# Patient Record
Sex: MALE | Race: OTHER | HISPANIC OR LATINO | URBAN - METROPOLITAN AREA
[De-identification: names, ages, dates, MRNs, and addresses within clinical notes are randomized per-mention and may not be internally consistent; named-entity substitution may affect disease eponyms.]

---

## 2017-03-01 RX ORDER — AZTREONAM 2 G
1 VIAL (EA) INJECTION
Qty: 0 | Refills: 0 | COMMUNITY
Start: 2017-03-01 | End: 2017-03-09

## 2017-03-02 ENCOUNTER — INPATIENT (INPATIENT)
Facility: HOSPITAL | Age: 72
LOS: 3 days | Discharge: HOME | End: 2017-03-06
Attending: HOSPITALIST | Admitting: HOSPITALIST
Payer: COMMERCIAL

## 2017-03-02 VITALS
SYSTOLIC BLOOD PRESSURE: 99 MMHG | RESPIRATION RATE: 22 BRPM | DIASTOLIC BLOOD PRESSURE: 51 MMHG | TEMPERATURE: 98 F | OXYGEN SATURATION: 90 % | HEART RATE: 100 BPM

## 2017-03-02 DIAGNOSIS — Z96.653 PRESENCE OF ARTIFICIAL KNEE JOINT, BILATERAL: Chronic | ICD-10-CM

## 2017-03-02 LAB
ALBUMIN SERPL ELPH-MCNC: 3.7 G/DL — SIGNIFICANT CHANGE UP (ref 3.3–5)
ALP SERPL-CCNC: 38 U/L — LOW (ref 40–120)
ALT FLD-CCNC: 24 U/L — SIGNIFICANT CHANGE UP (ref 4–41)
APPEARANCE UR: CLEAR — SIGNIFICANT CHANGE UP
APTT BLD: 29.1 SEC — SIGNIFICANT CHANGE UP (ref 27.5–37.4)
AST SERPL-CCNC: 49 U/L — HIGH (ref 4–40)
B PERT DNA SPEC QL NAA+PROBE: SIGNIFICANT CHANGE UP
BASOPHILS # BLD AUTO: 0.01 K/UL — SIGNIFICANT CHANGE UP (ref 0–0.2)
BASOPHILS NFR BLD AUTO: 0.1 % — SIGNIFICANT CHANGE UP (ref 0–2)
BASOPHILS NFR SPEC: 0 % — SIGNIFICANT CHANGE UP (ref 0–2)
BILIRUB SERPL-MCNC: 1.1 MG/DL — SIGNIFICANT CHANGE UP (ref 0.2–1.2)
BILIRUB UR-MCNC: NEGATIVE — SIGNIFICANT CHANGE UP
BLOOD UR QL VISUAL: NEGATIVE — SIGNIFICANT CHANGE UP
BUN SERPL-MCNC: 21 MG/DL — SIGNIFICANT CHANGE UP (ref 7–23)
C PNEUM DNA SPEC QL NAA+PROBE: NOT DETECTED — SIGNIFICANT CHANGE UP
CALCIUM SERPL-MCNC: 8.6 MG/DL — SIGNIFICANT CHANGE UP (ref 8.4–10.5)
CHLORIDE SERPL-SCNC: 94 MMOL/L — LOW (ref 98–107)
CK MB BLD-MCNC: 1.1 — SIGNIFICANT CHANGE UP (ref 0–2.5)
CK MB BLD-MCNC: 10.42 NG/ML — HIGH (ref 1–6.6)
CK SERPL-CCNC: 975 U/L — HIGH (ref 30–200)
CO2 SERPL-SCNC: 21 MMOL/L — LOW (ref 22–31)
COLOR SPEC: HIGH
CREAT SERPL-MCNC: 1.22 MG/DL — SIGNIFICANT CHANGE UP (ref 0.5–1.3)
EOSINOPHIL # BLD AUTO: 0.02 K/UL — SIGNIFICANT CHANGE UP (ref 0–0.5)
EOSINOPHIL NFR BLD AUTO: 0.2 % — SIGNIFICANT CHANGE UP (ref 0–6)
EOSINOPHIL NFR FLD: 0 % — SIGNIFICANT CHANGE UP (ref 0–6)
FLUAV H1 2009 PAND RNA SPEC QL NAA+PROBE: POSITIVE — HIGH
FLUAV H1 RNA SPEC QL NAA+PROBE: NOT DETECTED — SIGNIFICANT CHANGE UP
FLUAV H3 RNA SPEC QL NAA+PROBE: NOT DETECTED — SIGNIFICANT CHANGE UP
FLUBV RNA SPEC QL NAA+PROBE: NOT DETECTED — SIGNIFICANT CHANGE UP
GIANT PLATELETS BLD QL SMEAR: PRESENT — SIGNIFICANT CHANGE UP
GLUCOSE SERPL-MCNC: 122 MG/DL — HIGH (ref 70–99)
GLUCOSE UR-MCNC: NEGATIVE — SIGNIFICANT CHANGE UP
HADV DNA SPEC QL NAA+PROBE: NOT DETECTED — SIGNIFICANT CHANGE UP
HCOV 229E RNA SPEC QL NAA+PROBE: NOT DETECTED — SIGNIFICANT CHANGE UP
HCOV HKU1 RNA SPEC QL NAA+PROBE: NOT DETECTED — SIGNIFICANT CHANGE UP
HCOV NL63 RNA SPEC QL NAA+PROBE: NOT DETECTED — SIGNIFICANT CHANGE UP
HCOV OC43 RNA SPEC QL NAA+PROBE: NOT DETECTED — SIGNIFICANT CHANGE UP
HCT VFR BLD CALC: 31.3 % — LOW (ref 39–50)
HGB BLD-MCNC: 10.4 G/DL — LOW (ref 13–17)
HMPV RNA SPEC QL NAA+PROBE: NOT DETECTED — SIGNIFICANT CHANGE UP
HPIV1 RNA SPEC QL NAA+PROBE: NOT DETECTED — SIGNIFICANT CHANGE UP
HPIV2 RNA SPEC QL NAA+PROBE: NOT DETECTED — SIGNIFICANT CHANGE UP
HPIV3 RNA SPEC QL NAA+PROBE: NOT DETECTED — SIGNIFICANT CHANGE UP
HPIV4 RNA SPEC QL NAA+PROBE: NOT DETECTED — SIGNIFICANT CHANGE UP
HYALINE CASTS # UR AUTO: SIGNIFICANT CHANGE UP (ref 0–?)
IMM GRANULOCYTES NFR BLD AUTO: 0.5 % — SIGNIFICANT CHANGE UP (ref 0–1.5)
INR BLD: 1.37 — HIGH (ref 0.87–1.18)
KETONES UR-MCNC: NEGATIVE — SIGNIFICANT CHANGE UP
LEUKOCYTE ESTERASE UR-ACNC: HIGH
LYMPHOCYTES # BLD AUTO: 0.52 K/UL — LOW (ref 1–3.3)
LYMPHOCYTES # BLD AUTO: 4 % — LOW (ref 13–44)
LYMPHOCYTES NFR SPEC AUTO: 4.2 % — LOW (ref 13–44)
M PNEUMO DNA SPEC QL NAA+PROBE: NOT DETECTED — SIGNIFICANT CHANGE UP
MCHC RBC-ENTMCNC: 31.3 PG — SIGNIFICANT CHANGE UP (ref 27–34)
MCHC RBC-ENTMCNC: 33.2 % — SIGNIFICANT CHANGE UP (ref 32–36)
MCV RBC AUTO: 94.3 FL — SIGNIFICANT CHANGE UP (ref 80–100)
MONOCYTES # BLD AUTO: 0.91 K/UL — HIGH (ref 0–0.9)
MONOCYTES NFR BLD AUTO: 7.1 % — SIGNIFICANT CHANGE UP (ref 2–14)
MONOCYTES NFR BLD: 7.6 % — SIGNIFICANT CHANGE UP (ref 2–9)
MUCOUS THREADS # UR AUTO: SIGNIFICANT CHANGE UP
NEUTROPHIL AB SER-ACNC: 78.9 % — HIGH (ref 43–77)
NEUTROPHILS # BLD AUTO: 11.36 K/UL — HIGH (ref 1.8–7.4)
NEUTROPHILS NFR BLD AUTO: 88.1 % — HIGH (ref 43–77)
NEUTS BAND # BLD: 9.3 % — HIGH (ref 0–6)
NITRITE UR-MCNC: NEGATIVE — SIGNIFICANT CHANGE UP
NON-SQ EPI CELLS # UR AUTO: <1 — SIGNIFICANT CHANGE UP
PH UR: 6 — SIGNIFICANT CHANGE UP (ref 4.6–8)
PLATELET # BLD AUTO: 171 K/UL — SIGNIFICANT CHANGE UP (ref 150–400)
PMV BLD: 10.7 FL — SIGNIFICANT CHANGE UP (ref 7–13)
POTASSIUM SERPL-MCNC: 4.2 MMOL/L — SIGNIFICANT CHANGE UP (ref 3.5–5.3)
POTASSIUM SERPL-SCNC: 4.2 MMOL/L — SIGNIFICANT CHANGE UP (ref 3.5–5.3)
PROT SERPL-MCNC: 7.1 G/DL — SIGNIFICANT CHANGE UP (ref 6–8.3)
PROT UR-MCNC: 30 — SIGNIFICANT CHANGE UP
PROTHROM AB SERPL-ACNC: 15.7 SEC — HIGH (ref 10–13.1)
RBC # BLD: 3.32 M/UL — LOW (ref 4.2–5.8)
RBC # FLD: 13.3 % — SIGNIFICANT CHANGE UP (ref 10.3–14.5)
RBC CASTS # UR COMP ASSIST: SIGNIFICANT CHANGE UP (ref 0–?)
RSV RNA SPEC QL NAA+PROBE: NOT DETECTED — SIGNIFICANT CHANGE UP
RV+EV RNA SPEC QL NAA+PROBE: NOT DETECTED — SIGNIFICANT CHANGE UP
SODIUM SERPL-SCNC: 133 MMOL/L — LOW (ref 135–145)
SP GR SPEC: 1.02 — SIGNIFICANT CHANGE UP (ref 1–1.03)
SQUAMOUS # UR AUTO: SIGNIFICANT CHANGE UP
TROPONIN T SERPL-MCNC: < 0.06 NG/ML — SIGNIFICANT CHANGE UP (ref 0–0.06)
UROBILINOGEN FLD QL: >8 E.U. — SIGNIFICANT CHANGE UP (ref 0.1–0.2)
WBC # BLD: 12.88 K/UL — HIGH (ref 3.8–10.5)
WBC # FLD AUTO: 12.88 K/UL — HIGH (ref 3.8–10.5)
WBC UR QL: HIGH (ref 0–?)

## 2017-03-02 PROCEDURE — 71020: CPT | Mod: 26

## 2017-03-02 PROCEDURE — 71275 CT ANGIOGRAPHY CHEST: CPT | Mod: 26

## 2017-03-02 RX ORDER — CIPROFLOXACIN LACTATE 400MG/40ML
400 VIAL (ML) INTRAVENOUS ONCE
Qty: 0 | Refills: 0 | Status: COMPLETED | OUTPATIENT
Start: 2017-03-02 | End: 2017-03-02

## 2017-03-02 RX ORDER — IPRATROPIUM/ALBUTEROL SULFATE 18-103MCG
3 AEROSOL WITH ADAPTER (GRAM) INHALATION ONCE
Qty: 0 | Refills: 0 | Status: COMPLETED | OUTPATIENT
Start: 2017-03-02 | End: 2017-03-02

## 2017-03-02 RX ORDER — VANCOMYCIN HCL 1 G
1000 VIAL (EA) INTRAVENOUS ONCE
Qty: 0 | Refills: 0 | Status: COMPLETED | OUTPATIENT
Start: 2017-03-02 | End: 2017-03-02

## 2017-03-02 RX ORDER — CEFEPIME 1 G/1
1000 INJECTION, POWDER, FOR SOLUTION INTRAMUSCULAR; INTRAVENOUS ONCE
Qty: 0 | Refills: 0 | Status: COMPLETED | OUTPATIENT
Start: 2017-03-02 | End: 2017-03-02

## 2017-03-02 RX ORDER — SODIUM CHLORIDE 9 MG/ML
1000 INJECTION INTRAMUSCULAR; INTRAVENOUS; SUBCUTANEOUS ONCE
Qty: 0 | Refills: 0 | Status: COMPLETED | OUTPATIENT
Start: 2017-03-02 | End: 2017-03-02

## 2017-03-02 RX ADMIN — Medication 3 MILLILITER(S): at 21:30

## 2017-03-02 RX ADMIN — Medication 250 MILLIGRAM(S): at 23:23

## 2017-03-02 RX ADMIN — CEFEPIME 100 MILLIGRAM(S): 1 INJECTION, POWDER, FOR SOLUTION INTRAMUSCULAR; INTRAVENOUS at 22:45

## 2017-03-02 RX ADMIN — SODIUM CHLORIDE 1000 MILLILITER(S): 9 INJECTION INTRAMUSCULAR; INTRAVENOUS; SUBCUTANEOUS at 18:23

## 2017-03-02 RX ADMIN — Medication 125 MILLIGRAM(S): at 21:30

## 2017-03-02 RX ADMIN — Medication 3 MILLILITER(S): at 21:20

## 2017-03-02 RX ADMIN — Medication 3 MILLILITER(S): at 19:00

## 2017-03-02 NOTE — ED ADULT TRIAGE NOTE - CHIEF COMPLAINT QUOTE
states" I am having chest tightness and trouble breathing with cough since today" h/o total knee replacement on Monday and was d/c Yesterday .sent in from urgent care to r/o PE. patient has room air saturation of 90%. h/o Afib and pneumonia with previous knee replacement in the past. patient is on ASA only.

## 2017-03-02 NOTE — ED PROVIDER NOTE - OBJECTIVE STATEMENT
72 yo man w/ h/o htn, asthma, osteoarthritis p/w sob. Had L total knee replacement at [facility] ([facility]. Gee [facility]ta @ [facility]), discharged yesterday. States overnight he developed non-productive cough, sob, and intermittent chest pain. States he had similar symptoms w/ R knee replacement and was told he had pna. Went to urgent care today, was told to come to ED to be evaluated for pna vs PE. Denies h/o thrombosis.

## 2017-03-02 NOTE — ED ADULT NURSE NOTE - OBJECTIVE STATEMENT
Pt AAOx3 c/o cough and difficulty breathing s/o left knee replacement on Monday.  Pt sent from [facility] [place] for r/o PE and possible pneumonia.  Pt placed on 3L O2 via nasal canula.  Also c/o diaphoresis, admits to taking tylenol at 4pm today.  +rhonchi noted upon initial assessment. Labs drawn and sent.  S/L in place.  MD evaluated.  Pt awaiting further plan of care and evaluation.  Will monitor.

## 2017-03-02 NOTE — ED PROVIDER NOTE - ATTENDING CONTRIBUTION TO CARE
71m, d.c yesterday from [facility] s/p left TKA. last night, began having cough, fever 100.8, sob and chest tightness. has a h/o asthma. no leg pain/swelling other than the knee which he says is controlled with dilaudid. had similar symptoms after previous right tka when he had pna. exam, vs wnl, nad. hs normal, lungs wheezes b/l, decreased ae b/l. legs not edematous. will r/o pna with labs, cxr. if cxr neg, will get CTA r/o pe. otherwise nebs.

## 2017-03-02 NOTE — ED PROVIDER NOTE - MEDICAL DECISION MAKING DETAILS
72 yo man w/ cough, sob, chest pain. Most likely related to pna vs viral; however, pt is at risk for PE. Will check labs, ekg, cxr, CTA chest.

## 2017-03-02 NOTE — ED PROVIDER NOTE - PROGRESS NOTE DETAILS
CTA with multilobar PNA. Tx for HCAP initiated, dicussed need for admission with pt and wife given tachycardia (currently 110's) and hypoxia requiring 4L NC to maintain O2 sat of 95%. Pt does not want to stay in hospital, discussing with wife. Will initiate abx therapy while discussing. Attempted to reach pt's PMD Dr. Kaushik Raymundo regarding admission. Call placed to answering service, awaiting callback. Dr. Raymundo does not admit;  requests hospitalist admission. Accepted for admission to hospitalist. MAR textpage sent. Pt and family aware and amenable to admission. CTA with multilobar PNA. Tx for HCAP initiated, discussed need for admission with pt and wife given tachycardia (currently 110's) and hypoxia requiring 4L NC to maintain O2 sat of 95%. Pt does not want to stay in hospital, discussing with wife. Will initiate abx therapy while discussing. Attempted to reach pt's PMD Dr. Kaushik Raymundo regarding admission. Call placed to answering service, awaiting callback.  Tory: I d/w pt and wife need for admission and they agreed. pt and improved air entry after nebs, but still had an o2 requirement.

## 2017-03-03 ENCOUNTER — TRANSCRIPTION ENCOUNTER (OUTPATIENT)
Age: 72
End: 2017-03-03

## 2017-03-03 DIAGNOSIS — A41.9 SEPSIS, UNSPECIFIED ORGANISM: ICD-10-CM

## 2017-03-03 DIAGNOSIS — M19.90 UNSPECIFIED OSTEOARTHRITIS, UNSPECIFIED SITE: ICD-10-CM

## 2017-03-03 DIAGNOSIS — Z41.8 ENCOUNTER FOR OTHER PROCEDURES FOR PURPOSES OTHER THAN REMEDYING HEALTH STATE: ICD-10-CM

## 2017-03-03 DIAGNOSIS — N39.0 URINARY TRACT INFECTION, SITE NOT SPECIFIED: ICD-10-CM

## 2017-03-03 DIAGNOSIS — J18.9 PNEUMONIA, UNSPECIFIED ORGANISM: ICD-10-CM

## 2017-03-03 DIAGNOSIS — I10 ESSENTIAL (PRIMARY) HYPERTENSION: ICD-10-CM

## 2017-03-03 LAB
BUN SERPL-MCNC: 17 MG/DL — SIGNIFICANT CHANGE UP (ref 7–23)
CALCIUM SERPL-MCNC: 8.9 MG/DL — SIGNIFICANT CHANGE UP (ref 8.4–10.5)
CHLORIDE SERPL-SCNC: 89 MMOL/L — LOW (ref 98–107)
CHLORIDE UR-SCNC: 5 MMOL/L — SIGNIFICANT CHANGE UP
CK MB BLD-MCNC: 1.1 — SIGNIFICANT CHANGE UP (ref 0–2.5)
CK MB BLD-MCNC: 14.25 NG/ML — HIGH (ref 1–6.6)
CK MB BLD-MCNC: 25.67 NG/ML — HIGH (ref 1–6.6)
CK SERPL-CCNC: 1241 U/L — HIGH (ref 30–200)
CK SERPL-CCNC: 1690 U/L — HIGH (ref 30–200)
CO2 SERPL-SCNC: 23 MMOL/L — SIGNIFICANT CHANGE UP (ref 22–31)
CREAT SERPL-MCNC: 1.01 MG/DL — SIGNIFICANT CHANGE UP (ref 0.5–1.3)
GLUCOSE SERPL-MCNC: 209 MG/DL — HIGH (ref 70–99)
OSMOLALITY SERPL: 279 MOSMO/KG — SIGNIFICANT CHANGE UP (ref 275–295)
OSMOLALITY UR: 397 MOSMO/KG — SIGNIFICANT CHANGE UP (ref 50–1200)
POTASSIUM SERPL-MCNC: 4 MMOL/L — SIGNIFICANT CHANGE UP (ref 3.5–5.3)
POTASSIUM SERPL-SCNC: 4 MMOL/L — SIGNIFICANT CHANGE UP (ref 3.5–5.3)
POTASSIUM UR-SCNC: 11 MEQ/L — SIGNIFICANT CHANGE UP
SODIUM SERPL-SCNC: 130 MMOL/L — LOW (ref 135–145)
SODIUM UR-SCNC: 12 MEQ/L — SIGNIFICANT CHANGE UP
TROPONIN T SERPL-MCNC: < 0.06 NG/ML — SIGNIFICANT CHANGE UP (ref 0–0.06)
TROPONIN T SERPL-MCNC: < 0.06 NG/ML — SIGNIFICANT CHANGE UP (ref 0–0.06)

## 2017-03-03 PROCEDURE — 93971 EXTREMITY STUDY: CPT | Mod: 26

## 2017-03-03 PROCEDURE — 99223 1ST HOSP IP/OBS HIGH 75: CPT | Mod: GC

## 2017-03-03 RX ORDER — INFLUENZA VIRUS VACCINE 15; 15; 15; 15 UG/.5ML; UG/.5ML; UG/.5ML; UG/.5ML
0.5 SUSPENSION INTRAMUSCULAR ONCE
Qty: 0 | Refills: 0 | Status: COMPLETED | OUTPATIENT
Start: 2017-03-03 | End: 2017-03-03

## 2017-03-03 RX ORDER — ACETAMINOPHEN 500 MG
650 TABLET ORAL EVERY 6 HOURS
Qty: 0 | Refills: 0 | Status: DISCONTINUED | OUTPATIENT
Start: 2017-03-03 | End: 2017-03-06

## 2017-03-03 RX ORDER — CHOLECALCIFEROL (VITAMIN D3) 125 MCG
1000 CAPSULE ORAL DAILY
Qty: 0 | Refills: 0 | Status: DISCONTINUED | OUTPATIENT
Start: 2017-03-03 | End: 2017-03-06

## 2017-03-03 RX ORDER — PANTOPRAZOLE SODIUM 20 MG/1
40 TABLET, DELAYED RELEASE ORAL
Qty: 0 | Refills: 0 | Status: DISCONTINUED | OUTPATIENT
Start: 2017-03-03 | End: 2017-03-06

## 2017-03-03 RX ORDER — ENOXAPARIN SODIUM 100 MG/ML
40 INJECTION SUBCUTANEOUS EVERY 24 HOURS
Qty: 0 | Refills: 0 | Status: DISCONTINUED | OUTPATIENT
Start: 2017-03-03 | End: 2017-03-06

## 2017-03-03 RX ORDER — VANCOMYCIN HCL 1 G
1000 VIAL (EA) INTRAVENOUS EVERY 12 HOURS
Qty: 0 | Refills: 0 | Status: DISCONTINUED | OUTPATIENT
Start: 2017-03-03 | End: 2017-03-03

## 2017-03-03 RX ORDER — SENNA PLUS 8.6 MG/1
2 TABLET ORAL AT BEDTIME
Qty: 0 | Refills: 0 | Status: DISCONTINUED | OUTPATIENT
Start: 2017-03-03 | End: 2017-03-06

## 2017-03-03 RX ORDER — PIPERACILLIN AND TAZOBACTAM 4; .5 G/20ML; G/20ML
3.38 INJECTION, POWDER, LYOPHILIZED, FOR SOLUTION INTRAVENOUS EVERY 8 HOURS
Qty: 0 | Refills: 0 | Status: DISCONTINUED | OUTPATIENT
Start: 2017-03-03 | End: 2017-03-03

## 2017-03-03 RX ORDER — IPRATROPIUM/ALBUTEROL SULFATE 18-103MCG
3 AEROSOL WITH ADAPTER (GRAM) INHALATION EVERY 6 HOURS
Qty: 0 | Refills: 0 | Status: DISCONTINUED | OUTPATIENT
Start: 2017-03-03 | End: 2017-03-06

## 2017-03-03 RX ORDER — DOCUSATE SODIUM 100 MG
100 CAPSULE ORAL
Qty: 0 | Refills: 0 | Status: DISCONTINUED | OUTPATIENT
Start: 2017-03-03 | End: 2017-03-06

## 2017-03-03 RX ORDER — SODIUM CHLORIDE 9 MG/ML
1000 INJECTION INTRAMUSCULAR; INTRAVENOUS; SUBCUTANEOUS
Qty: 0 | Refills: 0 | Status: DISCONTINUED | OUTPATIENT
Start: 2017-03-03 | End: 2017-03-03

## 2017-03-03 RX ORDER — HYDROMORPHONE HYDROCHLORIDE 2 MG/ML
4 INJECTION INTRAMUSCULAR; INTRAVENOUS; SUBCUTANEOUS EVERY 4 HOURS
Qty: 0 | Refills: 0 | Status: DISCONTINUED | OUTPATIENT
Start: 2017-03-03 | End: 2017-03-06

## 2017-03-03 RX ORDER — SODIUM CHLORIDE 9 MG/ML
1000 INJECTION INTRAMUSCULAR; INTRAVENOUS; SUBCUTANEOUS
Qty: 0 | Refills: 0 | Status: DISCONTINUED | OUTPATIENT
Start: 2017-03-03 | End: 2017-03-06

## 2017-03-03 RX ADMIN — Medication 75 MILLIGRAM(S): at 22:12

## 2017-03-03 RX ADMIN — Medication 3 MILLILITER(S): at 03:29

## 2017-03-03 RX ADMIN — HYDROMORPHONE HYDROCHLORIDE 4 MILLIGRAM(S): 2 INJECTION INTRAMUSCULAR; INTRAVENOUS; SUBCUTANEOUS at 09:53

## 2017-03-03 RX ADMIN — HYDROMORPHONE HYDROCHLORIDE 4 MILLIGRAM(S): 2 INJECTION INTRAMUSCULAR; INTRAVENOUS; SUBCUTANEOUS at 17:58

## 2017-03-03 RX ADMIN — HYDROMORPHONE HYDROCHLORIDE 4 MILLIGRAM(S): 2 INJECTION INTRAMUSCULAR; INTRAVENOUS; SUBCUTANEOUS at 22:12

## 2017-03-03 RX ADMIN — HYDROMORPHONE HYDROCHLORIDE 4 MILLIGRAM(S): 2 INJECTION INTRAMUSCULAR; INTRAVENOUS; SUBCUTANEOUS at 09:28

## 2017-03-03 RX ADMIN — Medication 75 MILLIGRAM(S): at 00:08

## 2017-03-03 RX ADMIN — Medication 3 MILLILITER(S): at 14:45

## 2017-03-03 RX ADMIN — PANTOPRAZOLE SODIUM 40 MILLIGRAM(S): 20 TABLET, DELAYED RELEASE ORAL at 06:41

## 2017-03-03 RX ADMIN — HYDROMORPHONE HYDROCHLORIDE 4 MILLIGRAM(S): 2 INJECTION INTRAMUSCULAR; INTRAVENOUS; SUBCUTANEOUS at 23:10

## 2017-03-03 RX ADMIN — Medication 100 MILLIGRAM(S): at 17:58

## 2017-03-03 RX ADMIN — Medication 1000 UNIT(S): at 13:22

## 2017-03-03 RX ADMIN — SENNA PLUS 2 TABLET(S): 8.6 TABLET ORAL at 22:12

## 2017-03-03 RX ADMIN — Medication 650 MILLIGRAM(S): at 06:30

## 2017-03-03 RX ADMIN — PIPERACILLIN AND TAZOBACTAM 25 GRAM(S): 4; .5 INJECTION, POWDER, LYOPHILIZED, FOR SOLUTION INTRAVENOUS at 13:22

## 2017-03-03 RX ADMIN — Medication 100 MILLIGRAM(S): at 06:41

## 2017-03-03 RX ADMIN — HYDROMORPHONE HYDROCHLORIDE 4 MILLIGRAM(S): 2 INJECTION INTRAMUSCULAR; INTRAVENOUS; SUBCUTANEOUS at 13:35

## 2017-03-03 RX ADMIN — Medication 200 MILLIGRAM(S): at 00:38

## 2017-03-03 RX ADMIN — ENOXAPARIN SODIUM 40 MILLIGRAM(S): 100 INJECTION SUBCUTANEOUS at 06:15

## 2017-03-03 RX ADMIN — Medication 650 MILLIGRAM(S): at 07:30

## 2017-03-03 RX ADMIN — HYDROMORPHONE HYDROCHLORIDE 4 MILLIGRAM(S): 2 INJECTION INTRAMUSCULAR; INTRAVENOUS; SUBCUTANEOUS at 18:30

## 2017-03-03 RX ADMIN — HYDROMORPHONE HYDROCHLORIDE 4 MILLIGRAM(S): 2 INJECTION INTRAMUSCULAR; INTRAVENOUS; SUBCUTANEOUS at 02:34

## 2017-03-03 RX ADMIN — Medication 3 MILLILITER(S): at 21:42

## 2017-03-03 RX ADMIN — SODIUM CHLORIDE 100 MILLILITER(S): 9 INJECTION INTRAMUSCULAR; INTRAVENOUS; SUBCUTANEOUS at 15:30

## 2017-03-03 RX ADMIN — Medication 75 MILLIGRAM(S): at 09:28

## 2017-03-03 RX ADMIN — HYDROMORPHONE HYDROCHLORIDE 4 MILLIGRAM(S): 2 INJECTION INTRAMUSCULAR; INTRAVENOUS; SUBCUTANEOUS at 14:20

## 2017-03-03 RX ADMIN — SODIUM CHLORIDE 100 MILLILITER(S): 9 INJECTION INTRAMUSCULAR; INTRAVENOUS; SUBCUTANEOUS at 03:29

## 2017-03-03 RX ADMIN — PIPERACILLIN AND TAZOBACTAM 25 GRAM(S): 4; .5 INJECTION, POWDER, LYOPHILIZED, FOR SOLUTION INTRAVENOUS at 06:30

## 2017-03-03 RX ADMIN — Medication 250 MILLIGRAM(S): at 09:27

## 2017-03-03 RX ADMIN — HYDROMORPHONE HYDROCHLORIDE 4 MILLIGRAM(S): 2 INJECTION INTRAMUSCULAR; INTRAVENOUS; SUBCUTANEOUS at 03:13

## 2017-03-03 NOTE — DISCHARGE NOTE ADULT - PATIENT PORTAL LINK FT
“You can access the Follow[facility] Patient Portal, offered by [facility], by registering with the following website: [email or web address]”

## 2017-03-03 NOTE — H&P ADULT. - RS GEN PE MLT RESP DETAILS PC
no rales/no wheezes/respirations non-labored/no chest wall tenderness/no rhonchi/breath sounds equal/clear to auscultation bilaterally/good air movement/no intercostal retractions

## 2017-03-03 NOTE — DISCHARGE NOTE ADULT - MEDICATION SUMMARY - MEDICATIONS TO STOP TAKING
I will STOP taking the medications listed below when I get home from the hospital:    hydroCHLOROthiazide 12.5 mg oral capsule  -- 1 cap(s) by mouth once a day    losartan 25 mg oral tablet  -- 1 tab(s) by mouth once a day    Bactrim  mg-160 mg oral tablet  -- 1 tab(s) by mouth 2 times a day I will STOP taking the medications listed below when I get home from the hospital:    Bactrim  mg-160 mg oral tablet  -- 1 tab(s) by mouth 2 times a day

## 2017-03-03 NOTE — H&P ADULT. - PROBLEM SELECTOR PLAN 1
-will treat for HCAP and for UTI; gram negative for HCAP should provide coverage for UTI  -IV fluids; no hx of cardiac disease  -holding home antihypertensive, restart them as BP tolerates

## 2017-03-03 NOTE — H&P ADULT. - HISTORY OF PRESENT ILLNESS
70 yo M w/ hx HTN, asthma, osteoarthritis s/p L knee total replacement at [facility] (Dr. eGe Matta), discharged on 3/2/17 to go home; reports that he developed non-productive cough, SOB, intermittent pleuritic chest pain; was seen by visiting nurse who thought he should present to the hospital for the shortness of breath, concern was initially for PE in setting of recent orthopedic surgery (reports mild left lower extremity swelling, has been on  since discharge). Endorses slight fever, no chills, some diaphoresis, no confusion, speaking in sentences. On presentation to the ED, was given albuterol tx and abx, reports that currently feels much better. CXR initially didn't show consolidations; CTA negative for PE, but showed multilobar PNA. RVP positive for influenza HA3. Patient's wife and patient deny any sick contacts, but has been recently hospitalized. Denies any hx of thrombosis.

## 2017-03-03 NOTE — H&P ADULT. - ATTENDING COMMENTS
Patient was seen and evaluated, agree with above with the following additions     PE: Patient lying on bed with supplemental O2 via nasal cannula  RESP: Decrease breath sounds in all lung fields with rhonchi in all lung fields  EXT: Left knee erythema, edema.     A/P: 71 y.o. man with multiple medical co-morbidities now with multifocal pneumonia influenza infection    # Multifocal pneumonia   - Continue with HCAP coverage as patient was hospitalized for 3 days after his surgery   - Telemetry monitoring with continuous pulse oxymetry   - Continue with current IV abx     # Influenza infection   - Continue with tamiflu

## 2017-03-03 NOTE — H&P ADULT. - NEGATIVE CARDIOVASCULAR SYMPTOMS
no orthopnea/no dyspnea on exertion/no paroxysmal nocturnal dyspnea/no chest pain/no palpitations/no peripheral edema

## 2017-03-03 NOTE — H&P ADULT. - PROBLEM SELECTOR PLAN 4
-lovenox subQ for DVT ppx (high risk)  -increase activity as tolerated  -no evidence of PE at this time; will check leg ultrasound  -holding home  mg

## 2017-03-03 NOTE — H&P ADULT. - NEGATIVE GENERAL SYMPTOMS
no sweating/no weight loss/no chills/no polyphagia/no polyuria/no malaise/no anorexia/no weight gain/no polydipsia/no fatigue

## 2017-03-03 NOTE — H&P ADULT. - NEGATIVE NEUROLOGICAL SYMPTOMS
no focal seizures/no generalized seizures/no paresthesias/no transient paralysis/no syncope/no tremors/no weakness

## 2017-03-03 NOTE — DISCHARGE NOTE ADULT - PLAN OF CARE
Resolution You presented with fever, cough and shortness of breathe. You were found to have Influenzae HA3 (the Flu) and Cat scan of your chest revealed  multifocal pneumonia.  You were treated with antibiotics, nebulizer treatments and supplemental oxygen. You elected to leave against medical advice (AMA).   Please take Levaquin as prescribed. Take Tamflu as prescribed.   Please follow up your Primary Medical Physician as soon as possible for further medical care.      If patient develop new or worsening symptoms including but not limited to fever, chills, chest pain, worsening cough, coughing up blood, shortness of breath, nausea, vomiting, abdominal pain please visit your physician or an Emergency room. Continue taking Aspirin 325 twice for DVT prophylaxis as recommended per your Orthopedics  Follow up with your Orthopedic surgeon as scheduled.     If swelling in your leg worsening or you develop pain worsening pain, please visit your PMD or an emergency department. Blood pressure control Continue with your home Bystolic on discharge.   We discontinued your hydrochlorothiazide and Losartan on discharge. Please follow up with your primary medical doctor regarding when to resume these medications. You had an elevation of your muscle enzymes signifying muscle breakdown.   We recommend following up with your Primary medical doctor for repeat blood work for monitoring of these levels as soon as possible  Additionally we recommend keeping adequately hydrated. You presented with fever, cough and shortness of breathe. You were found to have Influenzae HA3 (the Flu) and CT scan of your chest revealed  multifocal pneumonia.  You were treated with antibiotics, nebulizer treatments and supplemental oxygen.   Please take Levaquin as prescribed to complete course of antibiotics.   Take Tamflu as prescribed.   You will be sent home with oxygen. Use as needed.   Please follow up your Primary Medical Physician within 1 week of discharge for further evaluation and treatment.    If patient develop new or worsening symptoms including but not limited to fever, chills, chest pain, worsening cough, coughing up blood, shortness of breath, nausea, vomiting, abdominal pain please visit your physician or an Emergency room. Continue taking medications as prescribed. You had an elevation of your muscle enzymes signifying muscle breakdown. You were treated with IV fluids and your blood levels downtrended.  We recommend keeping adequately hydrated.

## 2017-03-03 NOTE — PHYSICAL THERAPY INITIAL EVALUATION ADULT - ADDITIONAL COMMENTS
Patient lives in a home with MILAN and within; patient had a left TKR on Monday 2/27 and was d/c home on 3/1 with a rolling walker.

## 2017-03-03 NOTE — H&P ADULT. - MUSCULOSKELETAL
details… detailed exam Left knee s/p total knee replacement, dressing in place; right knee shows healed surgical scar, normal ROM

## 2017-03-03 NOTE — H&P ADULT. - PROBLEM SELECTOR PLAN 6
-SubQ heparin  -patient reports constipation 2/2 opioids, restarted home colace, will senna and miralax to avoid constipation

## 2017-03-03 NOTE — DISCHARGE NOTE ADULT - OTHER SIGNIFICANT FINDINGS
CXR: IMPRESSION:    Left lower lobe pneumonia.    CTA: FINDINGS:   LUNGS AND LARGE AIRWAYS: Patent central airways. Scattered patchy groundglass airspace and nodular opacities throughout the lungs with more focal consolidation in bilateral lower lobes compatible with a multi lobar pneumonia. Biapical scarring.    PLEURA: No pleural effusion.    VESSELS: No evidence of a pulmonary embolism. The thoracic aorta and main pulmonary artery are within normal limits for size. Atheromatous calcifications of the aorta.  HEART: Heart size is normal. No pericardial effusion. Mild coronary artery calcifications.  MEDIASTINUM AND RANDALL: Calcified right hilar lymph nodes likely a sequela of prior granulomatous disease. No lymphadenopathy.  CHEST WALL AND LOWER NECK: Within normal limits.  VISUALIZED UPPER ABDOMEN: Within normal limits.  BONES: Multilevel spinal degenerative changes.    IMPRESSION:   1. No evidence of a pulmonary embolism.  2. Multilobar pneumonia.    Doppler Left leg: Summary/Impressions:  1.  No evidence of deep venous thrombosis in the left lower extremity.  2.  No evidence of deep and superficial venous insufficiency noted in the left lower extremity.

## 2017-03-03 NOTE — PATIENT PROFILE ADULT. - VISION (WITH CORRECTIVE LENSES IF THE PATIENT USUALLY WEARS THEM):
Normal vision: sees adequately in most situations; can see medication labels, newsprint Partially impaired: cannot see medication labels or newsprint, but can see obstacles in path, and the surrounding layout; can count fingers at arm's length/distance and reading

## 2017-03-03 NOTE — PHYSICAL THERAPY INITIAL EVALUATION ADULT - RANGE OF MOTION EXAMINATION, REHAB EVAL
left Lower Extremity 0-90/bilateral upper extremity ROM was WFL (within functional limits)/Right LE ROM was WFL (within functional limits)

## 2017-03-03 NOTE — H&P ADULT. - PROBLEM SELECTOR PLAN 3
-patient denying dysuria at this time; UA shows positive leukocyte esterase  -abx for PNA should also cover gram negatives for UTI  -monitor for urinary symptoms

## 2017-03-03 NOTE — H&P ADULT. - LAB RESULTS AND INTERPRETATION
Elevated WBC w/ neutrophil predominance in setting of sepsis; also found to have band neutrophils (9.3%)  Mild anemia (10.4), though don't have baseline Hgb level (denies hx of recent bleeding, though s/p recent surgery)  BUN 21 (slightly elevated in setting of PNA; renal function borderline (Cr 1.22); mild hyponatremia in setting of HCTZ use and recent surgery  INR slightly elevated to 1.37 in the absence of coumadin use  CKMB and CK elevated; troponin negative; will check repeat enzymes to make sure downtrending; normal sinus rhythm on EKG  UA shows small leukocyte esterase and mild pyuria in setting of recent UTI  RVP positive for influenza HA3

## 2017-03-03 NOTE — PHYSICAL THERAPY INITIAL EVALUATION ADULT - PERTINENT HX OF CURRENT PROBLEM, REHAB EVAL
70 yo M w/ hx HTN, asthma, osteoarthritis s/p L knee total replacement at [facility] (Dr. Gee Matta), discharged on 3/2/17 to go home; reports that he developed non-productive cough, SOB, intermittent pleuritic chest pain; was seen by visiting nurse who thought he should present to the hospital for the shortness of breath,

## 2017-03-03 NOTE — H&P ADULT. - PROBLEM SELECTOR PLAN 2
-most likely viral PNA, treat w/ tamiflu 75 mg BID for 5 days; however, given recent hospitalization and intubation, will treat w/ abx for HCAP coverage  -will treat w/ vancomycin and zosyn for now and monitor  -Evaristo PRN

## 2017-03-03 NOTE — DISCHARGE NOTE ADULT - HOSPITAL COURSE
70 yo M w/ hx HTN, asthma, osteoarthritis s/p L knee total replacement at \[facility]\"" (Dr. Gee Matta), discharged on 3/2/17 to go home; reports that he developed non-productive cough, SOB, intermittent pleuritic chest pain; was seen by visiting nurse who thought he should present to the hospital for the shortness of breath, concern was initially for PE in setting of recent orthopedic surgery (reports mild left lower extremity swelling, has been on  since discharge). Endorses slight fever, no chills, some diaphoresis, no confusion, speaking in sentences. On presentation to the ED, was given albuterol tx and abx, reports that currently feels much better. CXR initially didn't show consolidations; CTA negative for PE, but showed multilobar PNA. RVP positive for influenza HA3. Patient's wife and patient deny any sick contacts, but has been recently hospitalized. Denies any hx of thrombosis.   Vitals in ED: -TmaxL 99.1 BP 99/51  RR 22 O2 90%    Given: Solumedrol 125, Duoneb x3. Abx (Cefepime, VAnco, and Cirpo x1) and 1L NS and was given supplemental O2.with improvement of BP to 128/48,  HR 85 RR18 O2 92%    Hospital Course:   HCAP: Pt was found to be RVP+ for Influenza HA3. Pt had CXR showing LLL PNA. CTA chest was performed to r/o DVT given recent surgery. CT was negative for PE, but showed multifocal PNA. Patient was started on Tamiflu for 5 days and was continued on Broad spectrum Abx (VAnco/Zosyn) or HCAP coverage. Doppler of Left lower extremity was performed to r/o DVT 2/2 to swelling of his leg. Doppler was negative for DVT.     Patient restarted on his Aspirin 325mg BID for DVT ppx per Ortho. 70 yo M w/ hx HTN, asthma, osteoarthritis s/p L knee total replacement at [facility] (Dr. Gee Matta), discharged on 3/2/17 to go home; reports that he developed non-productive cough, SOB, intermittent pleuritic chest pain; was seen by visiting nurse who thought he should present to the hospital for the shortness of breath, concern was initially for PE in setting of recent orthopedic surgery (reports mild left lower extremity swelling, has been on  since discharge). Endorses slight fever, no chills, some diaphoresis, no confusion, speaking in sentences. On presentation to the ED, was given albuterol tx and abx, reports that currently feels much better. CXR initially didn't show consolidations; CTA negative for PE, but showed multilobar PNA. RVP positive for influenza HA3. Patient's wife and patient deny any sick contacts, but has been recently hospitalized. Denies any hx of thrombosis.   Vitals in ED: -TmaxL 99.1 BP 99/51  RR 22 O2 90%    Given: Solumedrol 125, Duoneb x3. Abx (Cefepime, VAnco, and Cirpo x1) and 1L NS and was given supplemental O2.with improvement of BP to 128/48,  HR 85 RR18 O2 92%    Hospital Course:   Viral PNA: Pt was found to be RVP+ for Influenza HA3. Pt had CXR showing LLL PNA. CTA chest was performed to r/o DVT given recent surgery. CT was negative for PE, but showed multifocal PNA. Patient was started on Tamiflu for 5 days and was continued on Broad spectrum Abx (VAnco/Zosyn) or HCAP coverage. Doppler of Left lower extremity was performed to r/o DVT 2/2 to swelling of his leg. Doppler was negative for DVT. Antibiotics were switched to Levaquin as there was a lower suspicion for HCAP given +FLU. Patient additionally treated with duoneb and provided supplemental O2.   Ambulating O2 showed that patient desaturated to 86% on RA, and improved shortly thereafter to 90%. Resting O2 on RA was 94-96%.    Patient had mild Rhabdomyolysis with uptrending CKs. Patient was treated with IVF with monitoring of labs.     Patient wished to leave AMA. Risks were explained to him regarding leaving giving his illness including death, and kidney injury. Patient AOx3 and had capacity to make medical decisions. Patient completely understood all aspects of his medical care and prognosis and risks associated with leaving.   Patient stated he will follow up immediately with his PCP as outpatient.   As cannot monitor patient improvement, patient prescribed 5 days of Levaquin (to complete 7 days total) and 3 days of Tamiflu (to complete 5 days).   Patient recommended to have follow up blood work with his PCP to trend his CKs and monitor his blood work.     On discharge patient was continued on his Bystolic and his Losartan and HCTZ. Patient to f/u with PCP when to resume medications.   On discharge patient to taking Aspirin 325mg BID for DVT ppx per Ortho. 72 yo M w/ hx HTN, asthma, osteoarthritis s/p L knee total replacement at [facility] (Dr. Gee Matta), discharged on 3/2/17 to go home; reports that he developed non-productive cough, SOB, intermittent pleuritic chest pain; was seen by visiting nurse who thought he should present to the hospital for the shortness of breath, concern was initially for PE in setting of recent orthopedic surgery (reports mild left lower extremity swelling, has been on  since discharge). Endorses slight fever, no chills, some diaphoresis, no confusion, speaking in sentences. On presentation to the ED, was given albuterol tx and abx, reports that currently feels much better. CXR initially didn't show consolidations; CTA negative for PE, but showed multilobar PNA. RVP positive for influenza HA3. Patient's wife and patient deny any sick contacts, but has been recently hospitalized. Denies any hx of thrombosis.   Vitals in ED: -TmaxL 99.1 BP 99/51  RR 22 O2 90%    Given: Solumedrol 125, Duoneb x3. Abx (Cefepime, VAnco, and Cirpo x1) and 1L NS and was given supplemental O2.with improvement of BP to 128/48,  HR 85 RR18 O2 92%    Hospital Course:   Viral PNA: Pt was found to be RVP+ for Influenza HA3. Pt had CXR showing LLL PNA. CTA chest was performed to r/o DVT given recent surgery. CT was negative for PE, but showed multifocal PNA. Patient was started on Tamiflu for 5 days and was continued on Broad spectrum Abx (VAnco/Zosyn) or HCAP coverage. Doppler of Left lower extremity was performed to r/o DVT 2/2 to swelling of his leg. Doppler was negative for DVT. Antibiotics were switched to Levaquin as there was a lower suspicion for HCAP given +FLU. Patient additionally treated with duoneb and provided supplemental O2.   Ambulating O2 showed that patient desaturated to 86% on RA, and improved shortly thereafter to 90%. Resting O2 on RA was 94-96%.    Patient had mild Rhabdomyolysis with uptrending CKs. Patient was treated with IVF with monitoring of labs.     Patient wished to leave AMA. Risks were explained to him regarding leaving giving his illness including death, and kidney injury. Patient AOx3 and had capacity to make medical decisions. Patient completely understood all aspects of his medical care and prognosis and risks associated with leaving.   Patient stated he will follow up immediately with his PCP as outpatient.   As cannot monitor patient improvement, patient prescribed 5 days of Levaquin (to complete 7 days total) and 3 days of Tamiflu (to complete 5 days).   Patient recommended to have follow up blood work with his PCP to trend his CKs and monitor his blood work.     On discharge patient was continued on his Bystolic and his Losartan and HCTZ. Patient to f/u with PCP when to resume medications.   On discharge patient to taking Aspirin 325mg BID for DVT ppx per Ortho.     I personally discussed plan with Dr Raymundo. He is fully aware of patient hospital course and decision of patient to leave [place] and will see patient for followup. 70 yo M w/ hx HTN, asthma, osteoarthritis s/p L knee total replacement at [facility] (Dr. Gee Matta), discharged on 3/2/17 to go home; reports that he developed non-productive cough, SOB, intermittent pleuritic chest pain; was seen by visiting nurse who thought he should present to the hospital for the shortness of breath, concern was initially for PE in setting of recent orthopedic surgery (reports mild left lower extremity swelling, has been on  since discharge). Endorses slight fever, no chills, some diaphoresis, no confusion, speaking in sentences. On presentation to the ED, was given albuterol tx and abx, reports that currently feels much better. CXR initially didn't show consolidations; CTA negative for PE, but showed multilobar PNA. RVP positive for influenza HA3. Patient's wife and patient deny any sick contacts, but has been recently hospitalized. Denies any hx of thrombosis.   Vitals in ED: -TmaxL 99.1 BP 99/51  RR 22 O2 90%    Given: Solumedrol 125, Duoneb x3. Abx (Cefepime, VAnco, and Cirpo x1) and 1L NS and was given supplemental O2.with improvement of BP to 128/48,  HR 85 RR18 O2 92%    Hospital Course:   Viral PNA: Pt was found to be RVP+ for Influenza HA3. Pt had CXR showing LLL PNA. CTA chest was performed to r/o DVT given recent surgery. CT was negative for PE, but showed multifocal PNA. Patient was started on Tamiflu for 5 days and was continued on Broad spectrum Abx (VAnco/Zosyn) or HCAP coverage. Doppler of Left lower extremity was performed to r/o DVT 2/2 to swelling of his leg. Doppler was negative for DVT. Antibiotics were switched to Levaquin as there was a lower suspicion for HCAP given +FLU. Patient additionally treated with Duoneb and provided supplemental O2. Patient cough and shortness of breath improved. Pt did have SORIANO. Patient remained afebrile throughout hospitalization.     Patient discharged with 3 more days of Levaquin (to complete 7 days total) and 1 day of Tamiflu (to complete 5 days).  Ambulating O2 showed that patient desaturated to 81% on RA, and with Ambulating O2 with Oxygen was 99%. Patient to be discharged with home O2 PRN. Pt medically stable for discharge.    Patient had mild Rhabdomyolysis with uptrending CKs. Patient was treated with IVF with improvement.    On discharge patient instructed to continue taking Aspirin 325mg BID for DVT ppx per Ortho.     I personally discussed patient hospital course and treatment with Dr Raymundo. Understands need for f/u.   Pt instructed to f/u with PMD.

## 2017-03-03 NOTE — DISCHARGE NOTE ADULT - MEDICATION SUMMARY - MEDICATIONS TO TAKE
I will START or STAY ON the medications listed below when I get home from the hospital:    Dilaudid 4 mg oral tablet  -- 1 tab(s) by mouth every 4 hours, As Needed for severe pain  -- Indication: For Pain    Tylenol 325 mg oral tablet  -- 2 tab(s) by mouth every 4 hours, As Needed  -- Indication: For Pain    aspirin 325 mg oral tablet  -- 1 tab(s) by mouth 2 times a day  -- Indication: For DVT prophylaxis    oseltamivir 75 mg oral capsule  -- 1 cap(s) by mouth 2 times a day  -- Indication: For Influenza    Ambien 5 mg oral tablet  -- 1 tab(s) by mouth once a day (at bedtime)  -- Indication: For insomnia    Bystolic 10 mg oral tablet  -- 1 tab(s) by mouth once a day  -- Indication: For Hypertension    docusate sodium 100 mg oral capsule  -- 1 cap(s) by mouth 2 times a day  -- Indication: For Colace    pantoprazole 40 mg oral delayed release tablet  -- 1 tab(s) by mouth once a day  -- Indication: For GI prophylaxis    levoFLOXacin 750 mg oral tablet  -- 1 tab(s) by mouth every 24 hours  -- Indication: For Pneumonia    Vitamin D3 5000 intl units oral tablet  -- 1 tab(s) by mouth once a day  -- Indication: For Nutrition I will START or STAY ON the medications listed below when I get home from the hospital:    Home filled stationary and portable system with conserving device  -- 2 LPM via NC  Intermittent    Dx: Viral Pneumoniae  XAVIER 99 month  -- Indication: For Viral pneumonia    aspirin 325 mg oral tablet  -- 1 tab(s) by mouth 2 times a day  -- Indication: For DVT prophylaxis    Dilaudid 4 mg oral tablet  -- 1 tab(s) by mouth every 4 hours, As Needed for severe pain  -- Indication: For Pain    Tylenol 325 mg oral tablet  -- 2 tab(s) by mouth every 4 hours, As Needed  -- Indication: For Pain    losartan 25 mg oral tablet  -- 1 tab(s) by mouth once a day  -- Indication: For Hypertension    oseltamivir 75 mg oral capsule  -- 1 cap(s) by mouth 2 times a day  -- Indication: For Influenza    Ambien 5 mg oral tablet  -- 1 tab(s) by mouth once a day (at bedtime)  -- Indication: For insomnia    Bystolic 10 mg oral tablet  -- 1 tab(s) by mouth once a day  -- Indication: For Hypertension    hydroCHLOROthiazide 12.5 mg oral capsule  -- 1 cap(s) by mouth once a day  -- Indication: For Hypertension    docusate sodium 100 mg oral capsule  -- 1 cap(s) by mouth 2 times a day  -- Indication: For constipation    pantoprazole 40 mg oral delayed release tablet  -- 1 tab(s) by mouth once a day  -- Indication: For GI prophylaxis    levoFLOXacin 750 mg oral tablet  -- 1 tab(s) by mouth every 24 hours  -- Indication: For Pneumonia    Vitamin D3 5000 intl units oral tablet  -- 1 tab(s) by mouth once a day  -- Indication: For Nutrition

## 2017-03-03 NOTE — DISCHARGE NOTE ADULT - PROVIDER TOKENS
FREE:[LAST:[Orquidea],FIRST:[Lucas],PHONE:[phone]],FAX:[(   )    -],ADDRESS:[PCP]],FREE:[LAST:[Jenifer],FIRST:[Gee],PHONE:[phone]],FAX:[(   )    -],ADDRESS:[Cardiologist]]

## 2017-03-03 NOTE — H&P ADULT. - ASSESSMENT
72 yo M w/ hx HTN, asthma, OA s/p L knee replacement on 3/2/17, presenting from home on same of discharge after developing dyspnea, fevers, found to have multilobar PNA 2/2 Influenza HA3; presenting w/ sepsis, treating for HCAP.

## 2017-03-03 NOTE — DISCHARGE NOTE ADULT - ADDITIONAL INSTRUCTIONS
Follow up with Primary medical physician as soon as possible Follow up with Primary medical physician within 1 week of discharge.

## 2017-03-04 LAB
BUN SERPL-MCNC: 21 MG/DL — SIGNIFICANT CHANGE UP (ref 7–23)
CALCIUM SERPL-MCNC: 8.7 MG/DL — SIGNIFICANT CHANGE UP (ref 8.4–10.5)
CHLORIDE SERPL-SCNC: 96 MMOL/L — LOW (ref 98–107)
CK MB BLD-MCNC: 34.01 NG/ML — HIGH (ref 1–6.6)
CK SERPL-CCNC: 2452 U/L — HIGH (ref 30–200)
CO2 SERPL-SCNC: 22 MMOL/L — SIGNIFICANT CHANGE UP (ref 22–31)
CREAT SERPL-MCNC: 0.87 MG/DL — SIGNIFICANT CHANGE UP (ref 0.5–1.3)
GLUCOSE SERPL-MCNC: 103 MG/DL — HIGH (ref 70–99)
HCT VFR BLD CALC: 28.8 % — LOW (ref 39–50)
HGB BLD-MCNC: 9.7 G/DL — LOW (ref 13–17)
MAGNESIUM SERPL-MCNC: 2.1 MG/DL — SIGNIFICANT CHANGE UP (ref 1.6–2.6)
MCHC RBC-ENTMCNC: 31.9 PG — SIGNIFICANT CHANGE UP (ref 27–34)
MCHC RBC-ENTMCNC: 33.7 % — SIGNIFICANT CHANGE UP (ref 32–36)
MCV RBC AUTO: 94.7 FL — SIGNIFICANT CHANGE UP (ref 80–100)
PHOSPHATE SERPL-MCNC: 1.6 MG/DL — LOW (ref 2.5–4.5)
PLATELET # BLD AUTO: 209 K/UL — SIGNIFICANT CHANGE UP (ref 150–400)
PMV BLD: 10.6 FL — SIGNIFICANT CHANGE UP (ref 7–13)
POTASSIUM SERPL-MCNC: 4.6 MMOL/L — SIGNIFICANT CHANGE UP (ref 3.5–5.3)
POTASSIUM SERPL-SCNC: 4.6 MMOL/L — SIGNIFICANT CHANGE UP (ref 3.5–5.3)
PROCALCITONIN SERPL-MCNC: 4.16 NG/ML — HIGH (ref 0–0.05)
RBC # BLD: 3.04 M/UL — LOW (ref 4.2–5.8)
RBC # FLD: 13.9 % — SIGNIFICANT CHANGE UP (ref 10.3–14.5)
SODIUM SERPL-SCNC: 134 MMOL/L — LOW (ref 135–145)
SPECIMEN SOURCE: SIGNIFICANT CHANGE UP
SPECIMEN SOURCE: SIGNIFICANT CHANGE UP
TROPONIN T SERPL-MCNC: < 0.06 NG/ML — SIGNIFICANT CHANGE UP (ref 0–0.06)
WBC # BLD: 13.01 K/UL — HIGH (ref 3.8–10.5)
WBC # FLD AUTO: 13.01 K/UL — HIGH (ref 3.8–10.5)

## 2017-03-04 PROCEDURE — 99233 SBSQ HOSP IP/OBS HIGH 50: CPT

## 2017-03-04 RX ORDER — LOSARTAN POTASSIUM 100 MG/1
25 TABLET, FILM COATED ORAL DAILY
Qty: 0 | Refills: 0 | Status: DISCONTINUED | OUTPATIENT
Start: 2017-03-04 | End: 2017-03-06

## 2017-03-04 RX ORDER — ZALEPLON 10 MG
5 CAPSULE ORAL AT BEDTIME
Qty: 0 | Refills: 0 | Status: DISCONTINUED | OUTPATIENT
Start: 2017-03-04 | End: 2017-03-06

## 2017-03-04 RX ORDER — ASPIRIN/CALCIUM CARB/MAGNESIUM 324 MG
1 TABLET ORAL
Qty: 0 | Refills: 0 | COMMUNITY

## 2017-03-04 RX ORDER — ZOLPIDEM TARTRATE 10 MG/1
5 TABLET ORAL AT BEDTIME
Qty: 0 | Refills: 0 | Status: DISCONTINUED | OUTPATIENT
Start: 2017-03-04 | End: 2017-03-04

## 2017-03-04 RX ORDER — LOSARTAN POTASSIUM 100 MG/1
1 TABLET, FILM COATED ORAL
Qty: 0 | Refills: 0 | COMMUNITY

## 2017-03-04 RX ORDER — POTASSIUM PHOSPHATE, MONOBASIC POTASSIUM PHOSPHATE, DIBASIC 236; 224 MG/ML; MG/ML
15 INJECTION, SOLUTION INTRAVENOUS ONCE
Qty: 0 | Refills: 0 | Status: COMPLETED | OUTPATIENT
Start: 2017-03-04 | End: 2017-03-04

## 2017-03-04 RX ORDER — CIPROFLOXACIN LACTATE 400MG/40ML
1 VIAL (ML) INTRAVENOUS
Qty: 0 | Refills: 0 | COMMUNITY
Start: 2017-03-04

## 2017-03-04 RX ADMIN — HYDROMORPHONE HYDROCHLORIDE 4 MILLIGRAM(S): 2 INJECTION INTRAMUSCULAR; INTRAVENOUS; SUBCUTANEOUS at 21:35

## 2017-03-04 RX ADMIN — HYDROMORPHONE HYDROCHLORIDE 4 MILLIGRAM(S): 2 INJECTION INTRAMUSCULAR; INTRAVENOUS; SUBCUTANEOUS at 22:20

## 2017-03-04 RX ADMIN — LOSARTAN POTASSIUM 25 MILLIGRAM(S): 100 TABLET, FILM COATED ORAL at 17:04

## 2017-03-04 RX ADMIN — POTASSIUM PHOSPHATE, MONOBASIC POTASSIUM PHOSPHATE, DIBASIC 62.5 MILLIMOLE(S): 236; 224 INJECTION, SOLUTION INTRAVENOUS at 09:57

## 2017-03-04 RX ADMIN — SODIUM CHLORIDE 125 MILLILITER(S): 9 INJECTION INTRAMUSCULAR; INTRAVENOUS; SUBCUTANEOUS at 10:37

## 2017-03-04 RX ADMIN — Medication 1000 UNIT(S): at 15:51

## 2017-03-04 RX ADMIN — Medication 75 MILLIGRAM(S): at 21:35

## 2017-03-04 RX ADMIN — SENNA PLUS 2 TABLET(S): 8.6 TABLET ORAL at 21:35

## 2017-03-04 RX ADMIN — Medication 3 MILLILITER(S): at 16:32

## 2017-03-04 RX ADMIN — Medication 75 MILLIGRAM(S): at 15:51

## 2017-03-04 RX ADMIN — HYDROMORPHONE HYDROCHLORIDE 4 MILLIGRAM(S): 2 INJECTION INTRAMUSCULAR; INTRAVENOUS; SUBCUTANEOUS at 17:35

## 2017-03-04 RX ADMIN — Medication 5 MILLIGRAM(S): at 23:38

## 2017-03-04 RX ADMIN — HYDROMORPHONE HYDROCHLORIDE 4 MILLIGRAM(S): 2 INJECTION INTRAMUSCULAR; INTRAVENOUS; SUBCUTANEOUS at 18:08

## 2017-03-04 RX ADMIN — HYDROMORPHONE HYDROCHLORIDE 4 MILLIGRAM(S): 2 INJECTION INTRAMUSCULAR; INTRAVENOUS; SUBCUTANEOUS at 06:12

## 2017-03-04 RX ADMIN — SODIUM CHLORIDE 125 MILLILITER(S): 9 INJECTION INTRAMUSCULAR; INTRAVENOUS; SUBCUTANEOUS at 21:36

## 2017-03-04 RX ADMIN — Medication 3 MILLILITER(S): at 21:44

## 2017-03-04 RX ADMIN — Medication 100 MILLIGRAM(S): at 06:12

## 2017-03-04 RX ADMIN — PANTOPRAZOLE SODIUM 40 MILLIGRAM(S): 20 TABLET, DELAYED RELEASE ORAL at 06:12

## 2017-03-04 RX ADMIN — HYDROMORPHONE HYDROCHLORIDE 4 MILLIGRAM(S): 2 INJECTION INTRAMUSCULAR; INTRAVENOUS; SUBCUTANEOUS at 07:10

## 2017-03-05 LAB
BUN SERPL-MCNC: 13 MG/DL — SIGNIFICANT CHANGE UP (ref 7–23)
CALCIUM SERPL-MCNC: 8.6 MG/DL — SIGNIFICANT CHANGE UP (ref 8.4–10.5)
CHLORIDE SERPL-SCNC: 98 MMOL/L — SIGNIFICANT CHANGE UP (ref 98–107)
CK MB BLD-MCNC: 0.9 — SIGNIFICANT CHANGE UP (ref 0–2.5)
CK MB BLD-MCNC: 17.15 NG/ML — HIGH (ref 1–6.6)
CK SERPL-CCNC: 1970 U/L — HIGH (ref 30–200)
CO2 SERPL-SCNC: 25 MMOL/L — SIGNIFICANT CHANGE UP (ref 22–31)
CREAT SERPL-MCNC: 0.77 MG/DL — SIGNIFICANT CHANGE UP (ref 0.5–1.3)
GLUCOSE SERPL-MCNC: 136 MG/DL — HIGH (ref 70–99)
HCT VFR BLD CALC: 29.8 % — LOW (ref 39–50)
HGB BLD-MCNC: 9.8 G/DL — LOW (ref 13–17)
MAGNESIUM SERPL-MCNC: 2 MG/DL — SIGNIFICANT CHANGE UP (ref 1.6–2.6)
MCHC RBC-ENTMCNC: 31.1 PG — SIGNIFICANT CHANGE UP (ref 27–34)
MCHC RBC-ENTMCNC: 32.9 % — SIGNIFICANT CHANGE UP (ref 32–36)
MCV RBC AUTO: 94.6 FL — SIGNIFICANT CHANGE UP (ref 80–100)
PHOSPHATE SERPL-MCNC: 1.5 MG/DL — LOW (ref 2.5–4.5)
PLATELET # BLD AUTO: 233 K/UL — SIGNIFICANT CHANGE UP (ref 150–400)
PMV BLD: 10.2 FL — SIGNIFICANT CHANGE UP (ref 7–13)
POTASSIUM SERPL-MCNC: 3.9 MMOL/L — SIGNIFICANT CHANGE UP (ref 3.5–5.3)
POTASSIUM SERPL-SCNC: 3.9 MMOL/L — SIGNIFICANT CHANGE UP (ref 3.5–5.3)
RBC # BLD: 3.15 M/UL — LOW (ref 4.2–5.8)
RBC # FLD: 14.1 % — SIGNIFICANT CHANGE UP (ref 10.3–14.5)
SODIUM SERPL-SCNC: 137 MMOL/L — SIGNIFICANT CHANGE UP (ref 135–145)
WBC # BLD: 10.85 K/UL — HIGH (ref 3.8–10.5)
WBC # FLD AUTO: 10.85 K/UL — HIGH (ref 3.8–10.5)

## 2017-03-05 PROCEDURE — 99233 SBSQ HOSP IP/OBS HIGH 50: CPT

## 2017-03-05 RX ORDER — NEBIVOLOL HYDROCHLORIDE 5 MG/1
10 TABLET ORAL DAILY
Qty: 0 | Refills: 0 | Status: DISCONTINUED | OUTPATIENT
Start: 2017-03-05 | End: 2017-03-06

## 2017-03-05 RX ORDER — SODIUM,POTASSIUM PHOSPHATES 278-250MG
1 POWDER IN PACKET (EA) ORAL
Qty: 0 | Refills: 0 | Status: COMPLETED | OUTPATIENT
Start: 2017-03-05 | End: 2017-03-05

## 2017-03-05 RX ADMIN — Medication 75 MILLIGRAM(S): at 10:57

## 2017-03-05 RX ADMIN — Medication 1 TABLET(S): at 17:25

## 2017-03-05 RX ADMIN — Medication 650 MILLIGRAM(S): at 10:57

## 2017-03-05 RX ADMIN — Medication 3 MILLILITER(S): at 09:53

## 2017-03-05 RX ADMIN — HYDROMORPHONE HYDROCHLORIDE 4 MILLIGRAM(S): 2 INJECTION INTRAMUSCULAR; INTRAVENOUS; SUBCUTANEOUS at 23:42

## 2017-03-05 RX ADMIN — Medication 3 MILLILITER(S): at 15:58

## 2017-03-05 RX ADMIN — SODIUM CHLORIDE 125 MILLILITER(S): 9 INJECTION INTRAMUSCULAR; INTRAVENOUS; SUBCUTANEOUS at 05:35

## 2017-03-05 RX ADMIN — SENNA PLUS 2 TABLET(S): 8.6 TABLET ORAL at 23:42

## 2017-03-05 RX ADMIN — PANTOPRAZOLE SODIUM 40 MILLIGRAM(S): 20 TABLET, DELAYED RELEASE ORAL at 05:34

## 2017-03-05 RX ADMIN — NEBIVOLOL HYDROCHLORIDE 10 MILLIGRAM(S): 5 TABLET ORAL at 17:25

## 2017-03-05 RX ADMIN — HYDROMORPHONE HYDROCHLORIDE 4 MILLIGRAM(S): 2 INJECTION INTRAMUSCULAR; INTRAVENOUS; SUBCUTANEOUS at 13:15

## 2017-03-05 RX ADMIN — Medication 1000 UNIT(S): at 13:16

## 2017-03-05 RX ADMIN — HYDROMORPHONE HYDROCHLORIDE 4 MILLIGRAM(S): 2 INJECTION INTRAMUSCULAR; INTRAVENOUS; SUBCUTANEOUS at 14:15

## 2017-03-05 RX ADMIN — Medication 650 MILLIGRAM(S): at 11:50

## 2017-03-05 RX ADMIN — Medication 3 MILLILITER(S): at 03:35

## 2017-03-05 RX ADMIN — Medication 1 TABLET(S): at 23:43

## 2017-03-05 RX ADMIN — Medication 100 MILLIGRAM(S): at 17:26

## 2017-03-05 RX ADMIN — HYDROMORPHONE HYDROCHLORIDE 4 MILLIGRAM(S): 2 INJECTION INTRAMUSCULAR; INTRAVENOUS; SUBCUTANEOUS at 07:25

## 2017-03-05 RX ADMIN — Medication 75 MILLIGRAM(S): at 23:42

## 2017-03-05 RX ADMIN — Medication 3 MILLILITER(S): at 21:12

## 2017-03-05 RX ADMIN — Medication 650 MILLIGRAM(S): at 20:00

## 2017-03-05 RX ADMIN — Medication 1 TABLET(S): at 09:50

## 2017-03-05 RX ADMIN — HYDROMORPHONE HYDROCHLORIDE 4 MILLIGRAM(S): 2 INJECTION INTRAMUSCULAR; INTRAVENOUS; SUBCUTANEOUS at 17:25

## 2017-03-05 RX ADMIN — Medication 100 MILLIGRAM(S): at 05:34

## 2017-03-05 RX ADMIN — ENOXAPARIN SODIUM 40 MILLIGRAM(S): 100 INJECTION SUBCUTANEOUS at 05:34

## 2017-03-05 RX ADMIN — Medication 1 TABLET(S): at 13:16

## 2017-03-05 RX ADMIN — Medication 650 MILLIGRAM(S): at 19:04

## 2017-03-05 RX ADMIN — HYDROMORPHONE HYDROCHLORIDE 4 MILLIGRAM(S): 2 INJECTION INTRAMUSCULAR; INTRAVENOUS; SUBCUTANEOUS at 08:20

## 2017-03-05 RX ADMIN — LOSARTAN POTASSIUM 25 MILLIGRAM(S): 100 TABLET, FILM COATED ORAL at 05:34

## 2017-03-05 RX ADMIN — HYDROMORPHONE HYDROCHLORIDE 4 MILLIGRAM(S): 2 INJECTION INTRAMUSCULAR; INTRAVENOUS; SUBCUTANEOUS at 18:20

## 2017-03-05 NOTE — PROVIDER CONTACT NOTE (OTHER) - SITUATION
Patient dyspneic on exertion, wife is concerned that breathing is not improving. Using 3L NC. Would like patient to see pulmonologist

## 2017-03-06 VITALS
TEMPERATURE: 98 F | RESPIRATION RATE: 18 BRPM | OXYGEN SATURATION: 100 % | DIASTOLIC BLOOD PRESSURE: 79 MMHG | SYSTOLIC BLOOD PRESSURE: 153 MMHG | HEART RATE: 85 BPM

## 2017-03-06 LAB
BUN SERPL-MCNC: 10 MG/DL — SIGNIFICANT CHANGE UP (ref 7–23)
CALCIUM SERPL-MCNC: 8.7 MG/DL — SIGNIFICANT CHANGE UP (ref 8.4–10.5)
CHLORIDE SERPL-SCNC: 96 MMOL/L — LOW (ref 98–107)
CK MB BLD-MCNC: 0.7 — SIGNIFICANT CHANGE UP (ref 0–2.5)
CK MB BLD-MCNC: 11.76 NG/ML — HIGH (ref 1–6.6)
CK SERPL-CCNC: 1663 U/L — HIGH (ref 30–200)
CO2 SERPL-SCNC: 22 MMOL/L — SIGNIFICANT CHANGE UP (ref 22–31)
CREAT SERPL-MCNC: 0.72 MG/DL — SIGNIFICANT CHANGE UP (ref 0.5–1.3)
GLUCOSE SERPL-MCNC: 87 MG/DL — SIGNIFICANT CHANGE UP (ref 70–99)
HCT VFR BLD CALC: 29.7 % — LOW (ref 39–50)
HGB BLD-MCNC: 9.9 G/DL — LOW (ref 13–17)
MAGNESIUM SERPL-MCNC: 1.9 MG/DL — SIGNIFICANT CHANGE UP (ref 1.6–2.6)
MCHC RBC-ENTMCNC: 31.3 PG — SIGNIFICANT CHANGE UP (ref 27–34)
MCHC RBC-ENTMCNC: 33.3 % — SIGNIFICANT CHANGE UP (ref 32–36)
MCV RBC AUTO: 94 FL — SIGNIFICANT CHANGE UP (ref 80–100)
PHOSPHATE SERPL-MCNC: 2.8 MG/DL — SIGNIFICANT CHANGE UP (ref 2.5–4.5)
PLATELET # BLD AUTO: 265 K/UL — SIGNIFICANT CHANGE UP (ref 150–400)
PMV BLD: 10.5 FL — SIGNIFICANT CHANGE UP (ref 7–13)
POTASSIUM SERPL-MCNC: 3.5 MMOL/L — SIGNIFICANT CHANGE UP (ref 3.5–5.3)
POTASSIUM SERPL-SCNC: 3.5 MMOL/L — SIGNIFICANT CHANGE UP (ref 3.5–5.3)
RBC # BLD: 3.16 M/UL — LOW (ref 4.2–5.8)
RBC # FLD: 14.1 % — SIGNIFICANT CHANGE UP (ref 10.3–14.5)
SODIUM SERPL-SCNC: 136 MMOL/L — SIGNIFICANT CHANGE UP (ref 135–145)
WBC # BLD: 9.68 K/UL — SIGNIFICANT CHANGE UP (ref 3.8–10.5)
WBC # FLD AUTO: 9.68 K/UL — SIGNIFICANT CHANGE UP (ref 3.8–10.5)

## 2017-03-06 PROCEDURE — 99239 HOSP IP/OBS DSCHRG MGMT >30: CPT

## 2017-03-06 RX ORDER — LOSARTAN POTASSIUM 100 MG/1
1 TABLET, FILM COATED ORAL
Qty: 0 | Refills: 0 | DISCHARGE
Start: 2017-03-06

## 2017-03-06 RX ADMIN — Medication 5 MILLIGRAM(S): at 00:13

## 2017-03-06 RX ADMIN — Medication 3 MILLILITER(S): at 03:22

## 2017-03-06 RX ADMIN — HYDROMORPHONE HYDROCHLORIDE 4 MILLIGRAM(S): 2 INJECTION INTRAMUSCULAR; INTRAVENOUS; SUBCUTANEOUS at 16:19

## 2017-03-06 RX ADMIN — HYDROMORPHONE HYDROCHLORIDE 4 MILLIGRAM(S): 2 INJECTION INTRAMUSCULAR; INTRAVENOUS; SUBCUTANEOUS at 09:56

## 2017-03-06 RX ADMIN — HYDROMORPHONE HYDROCHLORIDE 4 MILLIGRAM(S): 2 INJECTION INTRAMUSCULAR; INTRAVENOUS; SUBCUTANEOUS at 16:45

## 2017-03-06 RX ADMIN — LOSARTAN POTASSIUM 25 MILLIGRAM(S): 100 TABLET, FILM COATED ORAL at 05:16

## 2017-03-06 RX ADMIN — NEBIVOLOL HYDROCHLORIDE 10 MILLIGRAM(S): 5 TABLET ORAL at 05:16

## 2017-03-06 RX ADMIN — HYDROMORPHONE HYDROCHLORIDE 4 MILLIGRAM(S): 2 INJECTION INTRAMUSCULAR; INTRAVENOUS; SUBCUTANEOUS at 06:15

## 2017-03-06 RX ADMIN — ENOXAPARIN SODIUM 40 MILLIGRAM(S): 100 INJECTION SUBCUTANEOUS at 05:16

## 2017-03-06 RX ADMIN — HYDROMORPHONE HYDROCHLORIDE 4 MILLIGRAM(S): 2 INJECTION INTRAMUSCULAR; INTRAVENOUS; SUBCUTANEOUS at 10:50

## 2017-03-06 RX ADMIN — HYDROMORPHONE HYDROCHLORIDE 4 MILLIGRAM(S): 2 INJECTION INTRAMUSCULAR; INTRAVENOUS; SUBCUTANEOUS at 05:16

## 2017-03-06 RX ADMIN — Medication 650 MILLIGRAM(S): at 02:44

## 2017-03-06 RX ADMIN — PANTOPRAZOLE SODIUM 40 MILLIGRAM(S): 20 TABLET, DELAYED RELEASE ORAL at 08:25

## 2017-03-06 RX ADMIN — SODIUM CHLORIDE 125 MILLILITER(S): 9 INJECTION INTRAMUSCULAR; INTRAVENOUS; SUBCUTANEOUS at 05:17

## 2017-03-06 RX ADMIN — Medication 650 MILLIGRAM(S): at 01:44

## 2017-03-06 RX ADMIN — HYDROMORPHONE HYDROCHLORIDE 4 MILLIGRAM(S): 2 INJECTION INTRAMUSCULAR; INTRAVENOUS; SUBCUTANEOUS at 00:42

## 2017-03-06 RX ADMIN — Medication 3 MILLILITER(S): at 09:02

## 2017-03-06 RX ADMIN — Medication 75 MILLIGRAM(S): at 10:44

## 2017-03-06 RX ADMIN — Medication 1000 UNIT(S): at 14:39

## 2017-03-08 LAB
BACTERIA BLD CULT: SIGNIFICANT CHANGE UP
BACTERIA BLD CULT: SIGNIFICANT CHANGE UP

## 2018-03-05 NOTE — PATIENT PROFILE ADULT. - BRADEN SCORE (IF 18 OR LESS ACTIVATE SKIN INJURY RISK INCREASED GUIDELINE), MLM
OFFICE VISIT    History    Rochelle Donahue is a 70 year old male who presents for:    # Post op cardiac bypass surgery   HISTORY:  Had bypass surgery 2/20/18  Doing well after the surgery  Has surgical site pain, but controlled without any medications    # Has some left shoulder weakness  Denies pain  Has not started PT yet  No new signs of stroke    Additional Review of systems  Denies: chest pain, shortness of breath    Current Outpatient Prescriptions   Medication Sig Dispense Refill   • lisinopril (ZESTRIL) 10 MG tablet Take 1 tablet by mouth daily. 30 tablet 2   • metoPROLOL tartrate (LOPRESSOR) 50 MG tablet Take 1 tablet by mouth every 12 hours. 60 tablet 2   • Calcium Acetate, Phos Binder, (CALCIUM ACETATE PO)      • famotidine (PEPCID) 20 MG tablet Take 1 tablet by mouth 2 times daily. 60 tablet 1   • ferrous sulfate 325 (65 FE) MG tablet Take 1 tablet by mouth daily. 90 tablet 3   • atorvastatin (LIPITOR) 80 MG tablet Take 1 tablet by mouth nightly. 90 tablet 3   • aspirin 81 MG chewable tablet Chew 1 tablet by mouth daily. 90 tablet 3     No current facility-administered medications for this visit.      ALLERGIES:  No Known Allergies  Past Medical History:   Diagnosis Date   • Aortic arch atherosclerosis (CMS/HCC) 10/2017     see BRIAN moderate srch athero   • Cerebral atherosclerosis 05/2017 5-2017 CTA IC mild IC carotid athero, severe distal R M1 stenosis and severe L M3 branch L inf division [place]    • Diarrhea    • Gastroesophageal reflux disease    • High cholesterol    • History of atrial flutter 05/2017 5-2017  began Xeralto June 2017 per cardiology   • History of right MCA stroke 05/28/2017 5-2017 off all meds, small seckles R MCA DWI; left side weakness   • History of right MCA stroke 10/14/2017     scattered R MCA watershed stroke  off xeralto   • History of right MCA stroke    • History of right MCA stroke    • Hypertension    • Left atrial enlargement 05/2017    May 2017  47  ml/m2  and LAS 4.3 cm   • Mobility impaired     uses cane sometimes for long distance   • No known problems    • PAD (peripheral artery disease) (CMS/HCC)     right LE, sever SFA dz distally  started Pletal 17 JESENIA 0.43 at rest   • Wears dentures     full set   • Wears glasses      Past Surgical History:   Procedure Laterality Date   • Cardiac surgery     • Colonoscopy  10-13-14   • No past surgeries       Social History     Social History   • Marital status:      Spouse name: N/A   • Number of children: N/A   • Years of education: N/A     Occupational History   • retired      Social History Main Topics   • Smoking status: Former Smoker   • Smokeless tobacco: Never Used   • Alcohol use No      Comment: ETOH abuse in past   • Drug use: No   • Sexual activity: Not on file     Other Topics Concern   • Not on file     Social History Narrative    Lives with children, retired.     Family History   Problem Relation Age of Onset   • Hypertension Brother    • High cholesterol Brother    • Heart disease Brother      Family Status   Relation Status   • Mother    • Father    • Daughter Alive   • Son Alive   • Brother        Physical Exam    Vital Signs:    Visit Vitals  BP 92/47   Pulse 66   Temp 98.4 °F (36.9 °C) (Temporal Artery)   Ht 5' 2\" (1.575 m)   Wt 50.4 kg   SpO2 99%   BMI 20.30 kg/m²     Constitutional:  No acute distress. Well-developed.  Skin:  Warm. Dry.   Head: Normocephalic, atraumatic  Eyes:  Normal conjunctivae.  Ears, nose, mouth and throat:  Oral mucosa moist. No pharyngeal erythema or tonsillar exudate.   Neck:  Supple. Nontender to palpation.  Lymphatic:  No cervical or submandibular lymphadenopathy.  Respiratory:  Lungs are clear to auscultation bilaterally. Respirations are nonlabored. Breath sounds are equal. No wheezing, rales, or rhonchi.   Cardiovascular:  Regular rate and rhythm. No murmurs. No edema.  Gastrointestinal:  Soft. Nontender. Nondistended. Normal bowel  sounds.  Musculoskeletal:  Normal gait. Calves nontender to palpation. Moves all 4 extremities spontaneously.  Neurologic:  No focal neurological deficits observed. Normal speech observed.  Psychiatric:  Alert and awake. Normal mood and affect. Responds appropriately to questions and commands.    *** [id number]    Assessment & Plan    Rochelle Donahue is a 70 year old male who presents for:    Health Maintenance Summary     Topic Due On Due Status Completed On    Colorectal Cancer Screening - Colonoscopy Oct 13, 2024 Not Due Oct 13, 2014    Immunization - Pneumococcal  Completed Jul 19, 2017    Abdominal Aortic Aneurysm (AAA) Screening  Oct 18, 2013 Overdue     Medicare Wellness Visit Jul 19, 2018 Due Soon Jul 19, 2017    IMMUNIZATION - DTaP/Tdap/Td Aug 28, 2027 Not Due Aug 28, 2017    Immunization-Influenza  Completed Sep 5, 2017    Depression Screening Jul 19, 2018 Not Due Jul 19, 2017    Hepatitis C Screening  Completed Feb 9, 2018    Lung Cancer Screening Oct 18, 2003 Overdue           Follow-up: No Follow-up on file. Sooner if worsening or not improving. Red flags discussed.    Instructed patient on correct medication dosing, usage and adverse effects (if applicable).  All questions and concerns discussed. Patient agreeable to plan.    Rory Watt MD, MA  Family Medicine with Obstetrics  [id number] [address] [place] [id number]  Telephone: [phone]           Fax: [phone]   20

## 2018-12-26 NOTE — PATIENT PROFILE ADULT. - AS SC BRADEN NUTRITION
Likely due to CHF exacerbation  Clinically euvolemic  No need for IV antibiotics  Blood Cultures Negative (3) adequate

## 2021-09-26 ENCOUNTER — INPATIENT (INPATIENT)
Facility: HOSPITAL | Age: 76
LOS: 4 days | Discharge: HOME HEALTH | End: 2021-10-01
Attending: INTERNAL MEDICINE | Admitting: INTERNAL MEDICINE
Payer: COMMERCIAL

## 2021-09-26 VITALS
OXYGEN SATURATION: 97 % | DIASTOLIC BLOOD PRESSURE: 78 MMHG | SYSTOLIC BLOOD PRESSURE: 127 MMHG | TEMPERATURE: 99 F | HEART RATE: 82 BPM | HEIGHT: 70 IN | RESPIRATION RATE: 16 BRPM

## 2021-09-26 DIAGNOSIS — J18.9 PNEUMONIA, UNSPECIFIED ORGANISM: ICD-10-CM

## 2021-09-26 DIAGNOSIS — Z96.653 PRESENCE OF ARTIFICIAL KNEE JOINT, BILATERAL: Chronic | ICD-10-CM

## 2021-09-26 PROBLEM — I10 ESSENTIAL (PRIMARY) HYPERTENSION: Chronic | Status: ACTIVE | Noted: 2017-03-02

## 2021-09-26 PROBLEM — M19.90 UNSPECIFIED OSTEOARTHRITIS, UNSPECIFIED SITE: Chronic | Status: ACTIVE | Noted: 2017-03-02

## 2021-09-26 LAB
ALBUMIN SERPL ELPH-MCNC: 4.6 G/DL — SIGNIFICANT CHANGE UP (ref 3.3–5)
ALP SERPL-CCNC: 66 U/L — SIGNIFICANT CHANGE UP (ref 40–120)
ALT FLD-CCNC: 30 U/L — SIGNIFICANT CHANGE UP (ref 4–41)
ANION GAP SERPL CALC-SCNC: 16 MMOL/L — HIGH (ref 7–14)
APPEARANCE UR: ABNORMAL
APTT BLD: 27.6 SEC — SIGNIFICANT CHANGE UP (ref 27–36.3)
AST SERPL-CCNC: 29 U/L — SIGNIFICANT CHANGE UP (ref 4–40)
B PERT DNA SPEC QL NAA+PROBE: SIGNIFICANT CHANGE UP
B PERT+PARAPERT DNA PNL SPEC NAA+PROBE: SIGNIFICANT CHANGE UP
BASE EXCESS BLDV CALC-SCNC: -3.6 MMOL/L — LOW (ref -2–3)
BASOPHILS # BLD AUTO: 0.04 K/UL — SIGNIFICANT CHANGE UP (ref 0–0.2)
BASOPHILS NFR BLD AUTO: 0.3 % — SIGNIFICANT CHANGE UP (ref 0–2)
BILIRUB SERPL-MCNC: 1.3 MG/DL — HIGH (ref 0.2–1.2)
BILIRUB UR-MCNC: NEGATIVE — SIGNIFICANT CHANGE UP
BLOOD GAS VENOUS COMPREHENSIVE RESULT: SIGNIFICANT CHANGE UP
BORDETELLA PARAPERTUSSIS (RAPRVP): SIGNIFICANT CHANGE UP
BUN SERPL-MCNC: 19 MG/DL — SIGNIFICANT CHANGE UP (ref 7–23)
C PNEUM DNA SPEC QL NAA+PROBE: SIGNIFICANT CHANGE UP
CALCIUM SERPL-MCNC: 9.5 MG/DL — SIGNIFICANT CHANGE UP (ref 8.4–10.5)
CHLORIDE BLDV-SCNC: 105 MMOL/L — SIGNIFICANT CHANGE UP (ref 96–108)
CHLORIDE SERPL-SCNC: 100 MMOL/L — SIGNIFICANT CHANGE UP (ref 98–107)
CO2 BLDV-SCNC: 22.7 MMOL/L — SIGNIFICANT CHANGE UP (ref 22–26)
CO2 SERPL-SCNC: 20 MMOL/L — LOW (ref 22–31)
COLOR SPEC: YELLOW — SIGNIFICANT CHANGE UP
CREAT SERPL-MCNC: 1.03 MG/DL — SIGNIFICANT CHANGE UP (ref 0.5–1.3)
DIFF PNL FLD: ABNORMAL
EOSINOPHIL # BLD AUTO: 0.07 K/UL — SIGNIFICANT CHANGE UP (ref 0–0.5)
EOSINOPHIL NFR BLD AUTO: 0.5 % — SIGNIFICANT CHANGE UP (ref 0–6)
FLUAV SUBTYP SPEC NAA+PROBE: SIGNIFICANT CHANGE UP
FLUBV RNA SPEC QL NAA+PROBE: SIGNIFICANT CHANGE UP
GAS PNL BLDV: 135 MMOL/L — LOW (ref 136–145)
GAS PNL BLDV: SIGNIFICANT CHANGE UP
GLUCOSE BLDV-MCNC: 140 MG/DL — HIGH (ref 70–99)
GLUCOSE SERPL-MCNC: 155 MG/DL — HIGH (ref 70–99)
GLUCOSE UR QL: NEGATIVE — SIGNIFICANT CHANGE UP
HADV DNA SPEC QL NAA+PROBE: SIGNIFICANT CHANGE UP
HCO3 BLDV-SCNC: 22 MMOL/L — SIGNIFICANT CHANGE UP (ref 22–29)
HCOV 229E RNA SPEC QL NAA+PROBE: SIGNIFICANT CHANGE UP
HCOV HKU1 RNA SPEC QL NAA+PROBE: SIGNIFICANT CHANGE UP
HCOV NL63 RNA SPEC QL NAA+PROBE: SIGNIFICANT CHANGE UP
HCOV OC43 RNA SPEC QL NAA+PROBE: SIGNIFICANT CHANGE UP
HCT VFR BLD CALC: 43 % — SIGNIFICANT CHANGE UP (ref 39–50)
HCT VFR BLDA CALC: 40 % — SIGNIFICANT CHANGE UP (ref 39–51)
HGB BLD CALC-MCNC: 13.4 G/DL — SIGNIFICANT CHANGE UP (ref 13–17)
HGB BLD-MCNC: 14.8 G/DL — SIGNIFICANT CHANGE UP (ref 13–17)
HMPV RNA SPEC QL NAA+PROBE: SIGNIFICANT CHANGE UP
HPIV1 RNA SPEC QL NAA+PROBE: SIGNIFICANT CHANGE UP
HPIV2 RNA SPEC QL NAA+PROBE: SIGNIFICANT CHANGE UP
HPIV3 RNA SPEC QL NAA+PROBE: SIGNIFICANT CHANGE UP
HPIV4 RNA SPEC QL NAA+PROBE: SIGNIFICANT CHANGE UP
IANC: 11.82 K/UL — HIGH (ref 1.5–8.5)
IMM GRANULOCYTES NFR BLD AUTO: 0.4 % — SIGNIFICANT CHANGE UP (ref 0–1.5)
INR BLD: 1.13 RATIO — SIGNIFICANT CHANGE UP (ref 0.88–1.16)
KETONES UR-MCNC: NEGATIVE — SIGNIFICANT CHANGE UP
LACTATE BLDV-MCNC: 1.5 MMOL/L — SIGNIFICANT CHANGE UP (ref 0.5–2)
LEUKOCYTE ESTERASE UR-ACNC: ABNORMAL
LYMPHOCYTES # BLD AUTO: 0.69 K/UL — LOW (ref 1–3.3)
LYMPHOCYTES # BLD AUTO: 5.2 % — LOW (ref 13–44)
M PNEUMO DNA SPEC QL NAA+PROBE: SIGNIFICANT CHANGE UP
MCHC RBC-ENTMCNC: 31.7 PG — SIGNIFICANT CHANGE UP (ref 27–34)
MCHC RBC-ENTMCNC: 34.4 GM/DL — SIGNIFICANT CHANGE UP (ref 32–36)
MCV RBC AUTO: 92.1 FL — SIGNIFICANT CHANGE UP (ref 80–100)
MONOCYTES # BLD AUTO: 0.67 K/UL — SIGNIFICANT CHANGE UP (ref 0–0.9)
MONOCYTES NFR BLD AUTO: 5 % — SIGNIFICANT CHANGE UP (ref 2–14)
NEUTROPHILS # BLD AUTO: 11.82 K/UL — HIGH (ref 1.8–7.4)
NEUTROPHILS NFR BLD AUTO: 88.6 % — HIGH (ref 43–77)
NITRITE UR-MCNC: NEGATIVE — SIGNIFICANT CHANGE UP
NRBC # BLD: 0 /100 WBCS — SIGNIFICANT CHANGE UP
NRBC # FLD: 0 K/UL — SIGNIFICANT CHANGE UP
PCO2 BLDV: 38 MMHG — LOW (ref 42–55)
PH BLDV: 7.36 — SIGNIFICANT CHANGE UP (ref 7.32–7.43)
PH UR: 6 — SIGNIFICANT CHANGE UP (ref 5–8)
PLATELET # BLD AUTO: 172 K/UL — SIGNIFICANT CHANGE UP (ref 150–400)
PO2 BLDV: 46 MMHG — SIGNIFICANT CHANGE UP
POTASSIUM BLDV-SCNC: 3.5 MMOL/L — SIGNIFICANT CHANGE UP (ref 3.5–5.1)
POTASSIUM SERPL-MCNC: 3.8 MMOL/L — SIGNIFICANT CHANGE UP (ref 3.5–5.3)
POTASSIUM SERPL-SCNC: 3.8 MMOL/L — SIGNIFICANT CHANGE UP (ref 3.5–5.3)
PROT SERPL-MCNC: 7.9 G/DL — SIGNIFICANT CHANGE UP (ref 6–8.3)
PROT UR-MCNC: ABNORMAL
PROTHROM AB SERPL-ACNC: 12.8 SEC — SIGNIFICANT CHANGE UP (ref 10.6–13.6)
RAPID RVP RESULT: SIGNIFICANT CHANGE UP
RBC # BLD: 4.67 M/UL — SIGNIFICANT CHANGE UP (ref 4.2–5.8)
RBC # FLD: 12.5 % — SIGNIFICANT CHANGE UP (ref 10.3–14.5)
RSV RNA SPEC QL NAA+PROBE: SIGNIFICANT CHANGE UP
RV+EV RNA SPEC QL NAA+PROBE: SIGNIFICANT CHANGE UP
SAO2 % BLDV: 76.7 % — SIGNIFICANT CHANGE UP
SARS-COV-2 RNA SPEC QL NAA+PROBE: SIGNIFICANT CHANGE UP
SODIUM SERPL-SCNC: 136 MMOL/L — SIGNIFICANT CHANGE UP (ref 135–145)
SP GR SPEC: 1.02 — SIGNIFICANT CHANGE UP (ref 1–1.05)
TROPONIN T, HIGH SENSITIVITY RESULT: 12 NG/L — SIGNIFICANT CHANGE UP
UROBILINOGEN FLD QL: ABNORMAL
WBC # BLD: 13.35 K/UL — HIGH (ref 3.8–10.5)
WBC # FLD AUTO: 13.35 K/UL — HIGH (ref 3.8–10.5)

## 2021-09-26 PROCEDURE — 99291 CRITICAL CARE FIRST HOUR: CPT | Mod: 25,GC

## 2021-09-26 PROCEDURE — 71275 CT ANGIOGRAPHY CHEST: CPT | Mod: 26

## 2021-09-26 PROCEDURE — 71045 X-RAY EXAM CHEST 1 VIEW: CPT | Mod: 26

## 2021-09-26 PROCEDURE — 93010 ELECTROCARDIOGRAM REPORT: CPT

## 2021-09-26 RX ORDER — PROCHLORPERAZINE MALEATE 5 MG
10 TABLET ORAL ONCE
Refills: 0 | Status: COMPLETED | OUTPATIENT
Start: 2021-09-26 | End: 2021-09-26

## 2021-09-26 RX ORDER — LANOLIN ALCOHOL/MO/W.PET/CERES
3 CREAM (GRAM) TOPICAL AT BEDTIME
Refills: 0 | Status: DISCONTINUED | OUTPATIENT
Start: 2021-09-26 | End: 2021-10-01

## 2021-09-26 RX ORDER — VANCOMYCIN HCL 1 G
1000 VIAL (EA) INTRAVENOUS ONCE
Refills: 0 | Status: COMPLETED | OUTPATIENT
Start: 2021-09-26 | End: 2021-09-26

## 2021-09-26 RX ORDER — ACETAMINOPHEN WITH CODEINE 300MG-30MG
1 TABLET ORAL ONCE
Refills: 0 | Status: DISCONTINUED | OUTPATIENT
Start: 2021-09-26 | End: 2021-09-26

## 2021-09-26 RX ORDER — ACETAMINOPHEN 500 MG
650 TABLET ORAL EVERY 6 HOURS
Refills: 0 | Status: DISCONTINUED | OUTPATIENT
Start: 2021-09-26 | End: 2021-10-01

## 2021-09-26 RX ORDER — ZOLPIDEM TARTRATE 10 MG/1
5 TABLET ORAL AT BEDTIME
Refills: 0 | Status: DISCONTINUED | OUTPATIENT
Start: 2021-09-26 | End: 2021-09-27

## 2021-09-26 RX ORDER — ONDANSETRON 8 MG/1
4 TABLET, FILM COATED ORAL EVERY 8 HOURS
Refills: 0 | Status: DISCONTINUED | OUTPATIENT
Start: 2021-09-26 | End: 2021-10-01

## 2021-09-26 RX ORDER — ACETAMINOPHEN 500 MG
975 TABLET ORAL ONCE
Refills: 0 | Status: COMPLETED | OUTPATIENT
Start: 2021-09-26 | End: 2021-09-26

## 2021-09-26 RX ORDER — PIPERACILLIN AND TAZOBACTAM 4; .5 G/20ML; G/20ML
3.38 INJECTION, POWDER, LYOPHILIZED, FOR SOLUTION INTRAVENOUS ONCE
Refills: 0 | Status: COMPLETED | OUTPATIENT
Start: 2021-09-26 | End: 2021-09-26

## 2021-09-26 RX ORDER — SODIUM CHLORIDE 9 MG/ML
2000 INJECTION INTRAMUSCULAR; INTRAVENOUS; SUBCUTANEOUS ONCE
Refills: 0 | Status: COMPLETED | OUTPATIENT
Start: 2021-09-26 | End: 2021-09-26

## 2021-09-26 RX ADMIN — Medication 975 MILLIGRAM(S): at 10:45

## 2021-09-26 RX ADMIN — Medication 1 TABLET(S): at 15:39

## 2021-09-26 RX ADMIN — SODIUM CHLORIDE 2000 MILLILITER(S): 9 INJECTION INTRAMUSCULAR; INTRAVENOUS; SUBCUTANEOUS at 10:47

## 2021-09-26 RX ADMIN — PIPERACILLIN AND TAZOBACTAM 200 GRAM(S): 4; .5 INJECTION, POWDER, LYOPHILIZED, FOR SOLUTION INTRAVENOUS at 10:46

## 2021-09-26 RX ADMIN — Medication 250 MILLIGRAM(S): at 11:52

## 2021-09-26 RX ADMIN — Medication 100 MILLIGRAM(S): at 12:09

## 2021-09-26 RX ADMIN — Medication 975 MILLIGRAM(S): at 18:28

## 2021-09-26 RX ADMIN — Medication 1 TABLET(S): at 18:28

## 2021-09-26 NOTE — H&P ADULT - PROBLEM SELECTOR PLAN 6
.  never hospitalized for asthma, never required intubation.  - c/w albuterol inhaler prn  - c/w home symbicort bid   - duonebs q6h prn SOB/wheezing .  never hospitalized for asthma, never required intubation.  - c/w albuterol inhaler prn  - c/w home symbicort bid   - duonebs q6h prn SOB/wheezing  - incentive spirometry BPH w/retention   per pt, since TURP 10 yrs ago c/b ?nerve injury, pt has been doing intermittent self-catheterizations ever since, typically max 2x a day. Denies any urinary sxs  - UA showing 187 WBCs, 1 WBC, large LE, neg nitrite   - bladder scans q12h  - c/w broad-spectrum abx   - f/u urine cxs  - c/w finasteride

## 2021-09-26 NOTE — H&P ADULT - PROBLEM SELECTOR PLAN 5
.  normally takes 1/2 tablet of losartan-HCTZ 50-12.5 but per pharmacy, do not carry HCTZ 6.25 (lowest dose 12.5 capsules)  - given HCTZ dose low, will hold for now  - c/w losartan 25mg qd  - c/w home bystolic 10mg qd given report of dysphagia, would consult surgery when clinically improving for eval for surgical correction

## 2021-09-26 NOTE — H&P ADULT - PROBLEM SELECTOR PLAN 3
.  elevated lactate 2.5 on admission --> 1.5 s/p 2L NS bolus in ED  - cont to monitor .  h/o Afib not on AC. Currently in NSR and normal rate. Was tachy to 170 in the ED while febrile  - CHADVASC score 3   - cont to monitor for now  - f/u repeat EKG h/o asthma (never intubated) p/w worsening productive cough x 1 month w/ fever and SOB. Febrile to 104 F on admission. s/p zosyn/vanc x 1 in ED. Was saturating lowest 93% on RA, now saturating well on 2L NC. Lungs CTAB. May be aspiration PNA given high fevers, history of ?dysphagia and possible Zenker's diverticulum  - CXR: b/l lower lung opacities c/f PNA  - CTA chest showing b/l patchy ground glass opacities. No PE  - c/w empiric zosyn/vanc pending blood/urine cxs  - aspiration precautions   - NPO pending SLP eval  - throat exudates swabbed, f/u cx

## 2021-09-26 NOTE — H&P ADULT - NSHPPHYSICALEXAM_GEN_ALL_CORE
VITALS:   Vital Signs Last 24 Hrs  T(C): 37.1 (26 Sep 2021 23:34), Max: 40 (26 Sep 2021 10:58)  T(F): 98.8 (26 Sep 2021 23:34), Max: 104 (26 Sep 2021 10:58)  HR: 86 (26 Sep 2021 23:34) (80 - 170)  BP: 153/83 (26 Sep 2021 23:34) (113/94 - 154/79)  BP(mean): --  RR: 16 (26 Sep 2021 23:34) (16 - 22)  SpO2: 97% (26 Sep 2021 23:34) (93% - 99%)    Physical Exam:  General: NAD, sitting up comfortably in bed   HEENT: PERRLA, EOMi, no scleral icterus. uvula midline. +erythema and scant white exudate on posterior b/l pharyngeal arches  Neck: supple, no jvd  CV: RRR, normal S1 and S2, no m/r/g  Lungs: CTAB, no wheezes, rales, or rhonchi. normal respiratory effort on 2L NC.   Abd: normal BS, soft, nontender, nondistended  Ext: no edema, 2+ peripheral pulses   Pysch: AAOx3  Neuro: grossly non-focal  Skin: no rashes or lesions VITALS:   Vital Signs Last 24 Hrs  T(C): 37.1 (26 Sep 2021 23:34), Max: 40 (26 Sep 2021 10:58)  T(F): 98.8 (26 Sep 2021 23:34), Max: 104 (26 Sep 2021 10:58)  HR: 86 (26 Sep 2021 23:34) (80 - 170)  BP: 153/83 (26 Sep 2021 23:34) (113/94 - 154/79)  BP(mean): --  RR: 16 (26 Sep 2021 23:34) (16 - 22)  SpO2: 97% (26 Sep 2021 23:34) (93% - 99%)    Physical Exam:  General: NAD, sitting up comfortably in bed; raspy voice   HEENT: PERRLA, EOMi, no scleral icterus. uvula midline. +erythema and scant white exudate on posterior b/l pharyngeal arches  Neck: supple, no jvd  CV: RRR, normal S1 and S2, no m/r/g  Lungs: CTAB, no wheezes, rales, or rhonchi. normal respiratory effort on 2L NC.   Abd: normal BS, soft, nontender, nondistended  Ext: no edema, 2+ peripheral pulses   Pysch: AAOx3, normal mood and affect  Neuro: grossly non-focal  Skin: no rashes or lesions

## 2021-09-26 NOTE — ED ADULT NURSE NOTE - OBJECTIVE STATEMENT
77 yo male, hx of PNA, Prostate disease (pt endorses self cauterization several times a day for urine), c/o intermittent SOB x 1 month, worsening over last several days. pt endorsing cough and fever at home (tmax 103 at home). pt reports chest pressure while coughing only. pt denies headache, dizziness, chest pain, abdominal pain, n/v/d, dysuria. pt endorses drinking 1-2 drinks daily (either wine or rum), no hx of withdrawal. pt endorses smoking cigars daily. pt received 2 moderna vaccines in Feb 2021.  18g iv established to R forearm, 18g iv established to L forearm, labs sent as ordered. pt medicated as ordered. pt appears comfortable, in no apparent distress at this time.

## 2021-09-26 NOTE — H&P ADULT - PROBLEM SELECTOR PLAN 7
.  Diet: regular  DVT ppx: lovenox 40mg sq  Dispo: home pending hospital course .  Diet: NPO pending SLP eval, aspiration precautions given possible aspiration PNA/zenker's   DVT ppx: lovenox 40mg sq  Dispo: home pending hospital course Essential hypertension  normally takes 1/2 tablet of losartan-HCTZ 50-12.5 but per pharmacy, do not carry HCTZ 6.25 (lowest dose 12.5 capsules)  - given HCTZ dose low, will hold for now  - c/w losartan 25mg qd  - c/w home bystolic 10mg qd

## 2021-09-26 NOTE — ED PROVIDER NOTE - CRITICAL CARE ATTENDING CONTRIBUTION TO CARE
75 yo male with PMH HTN, asthma presents to ED for evaluation of cough x 3 days with chest pressure after coughing spell. +fever. PE: tachycardic A/P sepsis workup - Labs, imaging, medicate, adm

## 2021-09-26 NOTE — H&P ADULT - PROBLEM SELECTOR PLAN 1
.  h/o asthma (never intubated) p/w worsening productive cough x 1 month w/ fever and SOB. Febrile to 104 F on admission. s/p zosyn/vanc x 1 in ED. Was saturating lowest 93% on RA, now saturating well on 2L NC. Lungs CTAB.   - CXR: b/l lower lung opacities c/f PNA  - CTA chest showing b/l patchy ground glass opacities. No PE    - c/w empiric zosyn/vanc pending blood/urine cxs .  h/o asthma (never intubated) p/w worsening productive cough x 1 month w/ fever and SOB. Febrile to 104 F on admission. s/p zosyn/vanc x 1 in ED. Was saturating lowest 93% on RA, now saturating well on 2L NC. Lungs CTAB. May be aspiration PNA given high fevers, history of ?dysphagia and possible Zenker's diverticulum  - CXR: b/l lower lung opacities c/f PNA  - CTA chest showing b/l patchy ground glass opacities. No PE    - c/w empiric zosyn/vanc pending blood/urine cxs  - aspiration precautions   - NPO pending SLP eval secondary to PNA  RVP/COVID negative  treatment of PNA as below  O2 as needed  incentive spirometry   continuous pulse ox secondary to PNA  RVP/COVID negative, s/p COVID vaccination (Moderna x2)  treatment of PNA as below  O2 as needed  incentive spirometry   continuous pulse ox

## 2021-09-26 NOTE — ED PROVIDER NOTE - PHYSICAL EXAMINATION
Gen: WDWN, NAD, Sinus tach to 160s, febrile to 104 rectally; non-toxic appearing   HEENT: EOMI, no nasal discharge, mucous membranes moist, no oropharyngeal edema/erythema/exudates   CV: Tachycardic with regular rhythm, +S1/S2, no M/R/G  Resp: Julien right sided basilar crackles, no increased WOB, SPO2 98% on RA   GI: Abdomen soft non-distended, NTTP, no masses  MSK: No midline spinal tenderness, normal neck ROM, no CVA tenderness, no open wounds, no bruising, no LE edema  Neuro: A&Ox4, following commands, moving all four extremities spontaneously  Psych: appropriate mood

## 2021-09-26 NOTE — H&P ADULT - ASSESSMENT
75yo M w/ PMH HTN, asthma, BPH, and chronic back pain (herniated discs, spinal stenosis) presents with cough x 1 now, worsening over the past few days now with fever (Tmax 103 at home), SOB, sputum production, and sore throat. In ED, was febrile to 104 F and tachy to 140s with leukocytosis of 13 and elevated lactate to 2.5 Now s/p 2L NS bolus, lactate downtrended to 1.5. CXR showed b/l lower lung opacities c/f PNA. CTA chest showed b/l patchy, ground glass opacities that may represent infection, as well as, an outpoaching of the esophagus near thoracic inlet suspicious for Zenker's diverticulum. No PE. Of note pt, also does self-catheterizations at home, without any urinary complaints. UA showing 187 WBCs, large LE, neg nitrite.  77yo M w/ PMH HTN, asthma, BPH, and chronic back pain (herniated discs, spinal stenosis) presents with cough x 1 now, worsening over the past few days now with fever (Tmax 103 at home), SOB, sputum production, and sore throat. In ED, was febrile to 104 F and tachy to 140s with leukocytosis of 13 and elevated lactate to 2.5 Now s/p 2L NS bolus, lactate downtrended to 1.5. CXR showed b/l lower lung opacities c/f PNA. CTA chest showed b/l patchy, ground glass opacities that may represent infection, as well as, an outpoaching of the esophagus near thoracic inlet suspicious for Zenker's diverticulum. No PE. Of note pt, also does self-catheterizations at home, without any urinary complaints. UA showing 187 WBCs, large LE, neg nitrite. Admitted to medicine for likely PNA, pending other infectious workup.  77yo M w/ PMH HTN, A-fib, asthma, BPH, and chronic back pain (herniated discs, spinal stenosis) presents with cough x 1 now, worsening over the past few days now with fever (Tmax 103 at home), SOB, sputum production, and sore throat. In ED, was febrile to 104 F and tachy to 140s with leukocytosis of 13 and elevated lactate to 2.5 Now s/p 2L NS bolus, lactate downtrended to 1.5. CXR showed b/l lower lung opacities c/f PNA. CTA chest showed b/l patchy, ground glass opacities that may represent infection, as well as, an outpoaching of the esophagus near thoracic inlet suspicious for Zenker's diverticulum. No PE. Of note pt, also does self-catheterizations at home, without any urinary complaints. UA showing 187 WBCs, large LE, neg nitrite. Admitted to medicine for likely PNA, pending other infectious workup.  77yo M w/ PMH HTN, A-fib, asthma, BPH, and chronic back pain (herniated discs, spinal stenosis) presents with cough x 1 now, worsening over the past few days now with fever (Tmax 103 at home), SOB, sputum production, and sore throat. In ED, was febrile to 104 F and tachy to 140s with leukocytosis of 13 and elevated lactate to 2.5 Now s/p 2L NS bolus, lactate downtrended to 1.5. CXR showed b/l lower lung opacities c/f PNA. CTA chest showed b/l patchy, ground glass opacities that may represent infection, as well as, an outpouching of the esophagus near thoracic inlet suspicious for Zenker's diverticulum. No PE. Of note pt, also does self-catheterizations at home, without any urinary complaints. UA showing 187 WBCs, large LE, neg nitrite. Admitted to medicine for likely PNA, pending other infectious workup.

## 2021-09-26 NOTE — ED ADULT TRIAGE NOTE - CHIEF COMPLAINT QUOTE
Pt h/o asthma c/o shortness of breath and chest tightness to middle of his chest for the past 2 days, pt states no relief of symptoms with albuterol and nebulizer treatments, afebrile, breathing even and mildly labored, spo2 >95% room air.

## 2021-09-26 NOTE — H&P ADULT - ATTENDING COMMENTS
76M w/HTN, afib (only on ASA), BPH, presenting with cough productive of green sputum, sore throat, fevers, chills and a raspy voice. RVP/COVID negative. PNA on CT, also noted >3cm Zenker's diverticulum and patient reports dysphagia, would c/s sx prior to d/c for surgical options. 76M w/HTN, afib (only on ASA), BPH, presenting with cough productive of green sputum, sore throat, fevers, chills and a raspy voice, found to have acute resp failure w/hypoxia secondary to PNA. RVP/COVID negative. CT noted >3cm Zenker's diverticulum and patient reports dysphagia, would c/s sx prior to d/c for surgical options. For PNA, treating broadly given elevated temp. Throat swabbed for exudates, f/u. Monitor on tele for Afib w/RVR noted, only on nebivolol, unclear why not on a/c, confirm with outpt cards in AM.

## 2021-09-26 NOTE — H&P ADULT - NSICDXFAMILYHX_GEN_ALL_CORE_FT
FAMILY HISTORY:  Father  Still living? Unknown  Family history of CKD (chronic kidney disease), Age at diagnosis: Age Unknown  FH: type 2 diabetes, Age at diagnosis: Age Unknown    Mother  Still living? Unknown  FH: lung cancer, Age at diagnosis: Age Unknown

## 2021-09-26 NOTE — H&P ADULT - HISTORY OF PRESENT ILLNESS
77yo M w/ PMH HTN, asthma, BPH, and chronic back pain (herniated discs, spinal stenosis) presents with worsening cough a/w fever and SOB. Pt states that he has been coughing for over a month now with a sore throat, worsening over the past 3-4 days with increasing sputum production and difficulty breathing. Spiked a 103 F fever today. Patient has a history of asthma and is supposed to use his albuterol 2x a day that he's not very compliant with. But since he's been coughing, he's been using it twice a day. Patient reports chest tightness and HA during coughing fits. No n/v/d, abdominal pain, CP, or recent travel. Has been hospitalized twice in the past for pneumonia. Never been hospitalized for asthma or required intubation.   He also has a sore throat that started after he started coughing. Denies difficulty swallowing but has noticed intermittent coughing while eating over the past 6 months. Denies foul breath/odor.   Of note, patient has a h/o BPH and had a TURP 10 yrs ago c/b ?nerve injury- so he has been doing intermittent self-catheterizations for the past 10 yrs, max 2x a day, less if he's home and can conveniently use the bathroom. Have not noticed any worsening urinary frequency or other urinary sxs. Denies dysuria, urinary urgency, hematuria.     In the ED, given 2L IV NS bolus, zosyn/vanc x 1, and Tylenol.    75yo M w/ PMH HTN, asthma, BPH, and chronic back pain (herniated discs, spinal stenosis) presents with worsening cough a/w fever and SOB. Pt states that he has been coughing for over a month now with a sore throat, worsening over the past 3-4 days with increasing sputum production and difficulty breathing. Spiked a 103 F fever today. Patient has a history of asthma and is supposed to use his symbicort 2x a day that he's not very compliant with. But since he's been coughing, he's been using it twice a day. Patient reports chest tightness and HA during coughing fits. No n/v/d, abdominal pain, CP, or recent travel. Has been hospitalized twice in the past for pneumonia. Never been hospitalized for asthma or required intubation.   He also has a sore throat that started after he started coughing. Denies difficulty swallowing but has noticed intermittent coughing while eating over the past 6 months. Denies foul breath/odor.   Of note, patient has a h/o BPH and had a TURP 10 yrs ago c/b ?nerve injury- so he has been doing intermittent self-catheterizations for the past 10 yrs, max 2x a day, less if he's home and can conveniently use the bathroom. Have not noticed any worsening urinary frequency or other urinary sxs. Denies dysuria, urinary urgency, hematuria.     In the ED, given 2L IV NS bolus, zosyn/vanc x 1, and Tylenol.    75yo M w/ PMH HTN, A-fib, asthma, BPH, and chronic back pain (herniated discs, spinal stenosis) presents with worsening cough a/w fever and SOB. Pt states that he has been coughing for over a month now with a sore throat, worsening over the past 3-4 days with increasing sputum production and difficulty breathing. Spiked a 103 F fever today. Patient has a history of asthma and is supposed to use his symbicort 2x a day that he's not very compliant with. But since he's been coughing, he's been using it twice a day. Patient reports chest tightness and HA during coughing fits. No n/v/d, abdominal pain, CP, or recent travel. Has been hospitalized twice in the past for pneumonia. Never been hospitalized for asthma or required intubation.   He also has a sore throat that started after he started coughing. Denies difficulty swallowing but has noticed intermittent coughing while eating over the past 6 months. Denies foul breath/odor.   Of note, patient has a h/o BPH and had a TURP 10 yrs ago c/b ?nerve injury- so he has been doing intermittent self-catheterizations for the past 10 yrs, max 2x a day, less if he's home and can conveniently use the bathroom. Have not noticed any worsening urinary frequency or other urinary sxs. Denies dysuria, urinary urgency, hematuria.     In the ED, given 2L IV NS bolus, zosyn/vanc x 1, and Tylenol.    75yo M w/ PMH HTN, A-fib (on ASA only), asthma, BPH, and chronic back pain (herniated discs, spinal stenosis) presents with worsening cough a/w fever and SOB. Pt states that he has been coughing for over a month now with a sore throat, worsening over the past 3-4 days with increasing sputum production and difficulty breathing. Spiked a 103 F fever today. Patient has a history of asthma and is supposed to use his symbicort 2x a day that he's not very compliant with. But since he's been coughing, he's been using it twice a day. Patient reports chest tightness and HA during coughing fits. No n/v/d, abdominal pain, CP, or recent travel. Has been hospitalized twice in the past for pneumonia (5 years ago, in setting of post-op). Never been hospitalized for asthma or required intubation.   He also has a sore throat that started after he started coughing. Denies difficulty swallowing but has noticed intermittent coughing while eating over the past 6 months. Denies foul breath/odor.   Of note, patient has a h/o BPH and had a TURP 10 yrs ago c/b ?nerve injury- so he has been doing intermittent self-catheterizations for the past 10 yrs, max 2x a day, less if he's home and can conveniently use the bathroom. Have not noticed any worsening urinary frequency or other urinary sxs. Denies dysuria, urinary urgency, hematuria.     In the ED, given 2L IV NS bolus, zosyn/vanc x 1, and Tylenol.

## 2021-09-26 NOTE — ED PROVIDER NOTE - OBJECTIVE STATEMENT
77 y/o male with pmhx of HTN and asthma presenting with cough and headache x 3-4 days. Patient states he has intermittent non-productive cough that results in chest pain afterwards that resolves after coughing with no active chest pain at this time. Hx of pneumonia 2x in past after surgeries. Feels warm but denies chills, n/v/d, rashes, or changes in urination. Headache is b/l and intermittent and also presents/worsens after coughing. COVID vaccinated with recent negative rapid COVID swab yesterday. Denies SOB, abdominal pain, neck pain/stiffness, back pain, weakness, numbness/tingling. No sick contacts. Intermittently self catheterizes since prostate procedure. Drinks 1-2 drinks/day with no hx of withdrawal

## 2021-09-26 NOTE — H&P ADULT - PROBLEM SELECTOR PLAN 4
.  per pt, since TURP 10 yrs ago c/b ?nerve injury, pt has been doing intermittent self-catheterizations ever since, typically max 2x a day. Denies any urinary sxs  - UA showing 187 WBCs, 1 WBC, large LE, neg nitrite     - c/w broad-spectrum abx   - f/u urine cxs .  per pt, since TURP 10 yrs ago c/b ?nerve injury, pt has been doing intermittent self-catheterizations ever since, typically max 2x a day. Denies any urinary sxs  - UA showing 187 WBCs, 1 WBC, large LE, neg nitrite     - bladder scans q12h   - c/w broad-spectrum abx   - f/u urine cxs monitor on tele  rate control with nebivolol  unclear why not on a/c however patient notes this is per his cardiologist recs, would confirm with patient's cardiologist in AM given [id number] 3 likely secondary to fever/sepsis  monitor on tele  rate control with nebivolol  unclear why not on a/c however patient notes this is per his cardiologist recs, would confirm with patient's cardiologist in AM given [id number]  check TSH

## 2021-09-26 NOTE — ED PROVIDER NOTE - CLINICAL SUMMARY MEDICAL DECISION MAKING FREE TEXT BOX
75 y/o male with pmhx of HTN and asthma presenting with cough and headache x 3-4 days, intermittent chest pain only after coughing, no active chest pain, tachycardic to 160s, febrile to 104, comfortable appearing, hemodynamically stable. Infectious w/u given HR, fever, and cough for suspicion of pneumonia vs viral syndrome. Will explore other sources including urine given intermittent self catheterization. Fluid bolus, Tylenol, and reassess after labs/CXR/RVP

## 2021-09-26 NOTE — H&P ADULT - NSHPREVIEWOFSYSTEMS_GEN_ALL_CORE
REVIEW OF SYSTEMS:  CONSTITUTIONAL: +fevers, no weight loss   EYES/ENT: No visual changes;  +sore throat   NECK: No pain or stiffness  RESPIRATORY: No hemoptysis; +sputum production   CARDIOVASCULAR: No chest pain or palpitations  GASTROINTESTINAL: No abdominal or epigastric pain. No nausea, vomiting, or hematemesis; No diarrhea or constipation. No melena or hematochezia.  GENITOURINARY: No dysuria, frequency or hematuria  NEUROLOGICAL: No syncope or dizziness  SKIN: No itching, rashes REVIEW OF SYSTEMS:  CONSTITUTIONAL: +fevers, no weight loss   EYES/ENT: No visual changes;  +sore throat   NECK: No pain or stiffness  RESPIRATORY: No hemoptysis; +green sputum production; (+)cough; (+)wheezing  CARDIOVASCULAR: No chest pain or palpitations; No LE edema  GASTROINTESTINAL: No abdominal or epigastric pain. No nausea, vomiting, or hematemesis; No diarrhea or constipation. No melena or hematochezia.  GENITOURINARY: No dysuria, frequency or hematuria  NEUROLOGICAL: No syncope or dizziness  MSK: ambulates without aid, no falls  SKIN: No itching, rashes

## 2021-09-26 NOTE — H&P ADULT - NSHPLABSRESULTS_GEN_ALL_CORE
.  LABS:                         14.8   13.35 )-----------( 172      ( 26 Sep 2021 10:52 )             43.0         136  |  100  |  19  ----------------------------<  155<H>  3.8   |  20<L>  |  1.03    Ca    9.5      26 Sep 2021 10:52    TPro  7.9  /  Alb  4.6  /  TBili  1.3<H>  /  DBili  x   /  AST  29  /  ALT  30  /  AlkPhos  66      PT/INR - ( 26 Sep 2021 10:52 )   PT: 12.8 sec;   INR: 1.13 ratio         PTT - ( 26 Sep 2021 10:52 )  PTT:27.6 sec  Urinalysis Basic - ( 26 Sep 2021 10:59 )    Color: Yellow / Appearance: Slightly Turbid / S.025 / pH: x  Gluc: x / Ketone: Negative  / Bili: Negative / Urobili: 3 mg/dL   Blood: x / Protein: 30 mg/dL / Nitrite: Negative   Leuk Esterase: Large / RBC: 1 /HPF /  /HPF   Sq Epi: x / Non Sq Epi: 10 /HPF / Bacteria: Few            RADIOLOGY, EKG & ADDITIONAL TESTS: Reviewed.    < from: Xray Chest 1 View- PORTABLE-Urgent (Xray Chest 1 View- PORTABLE-Urgent .) (21 @ 10:57) >    COMPARISON: Chest x-ray 3/2/2017    FINDINGS:  The cardiomediastinal silhouette is within normal limits.  The upper lungs are clear.  No bilateral pleural effusions.  Increased interstitial markings and mild airspace opacities seen in both lower lungs - may be due to interstitial-type pulmonary edema - but underlying pneumonia not excluded in the right clinical setting.  No pneumothorax.  Degenerative changes of the thoracic spine.    IMPRESSION:    Bilateral lower lung opacities concerning for pneumonia in the right clinical setting.    --- End of Report ---    < end of copied text >        < from: CT Angio Chest PE Protocol w/ IV Cont (21 @ 14:59) >    FINDINGS:    LUNGS AND AIRWAYS: Patent central airways.  Bilateral patchy and groundglass opacities.  PLEURA: Pleura is normal.  MEDIASTINUMAND RANDALL: The chest lymph nodes measure less than 10 mm in the short axis. The thyroid gland is normal. Arising from the aorta at the level of the thoracic inlet is a 3.4 x 1.5 cm outpouching that extends posterior to the left of midline and containsfluid and air.  VESSELS: No, central, lobar, segmental, or subsegmental pulmonary embolism. Aorta is normal in caliber. Aortic calcification.  HEART: Heart size is normal. No pericardial effusion. Coronary artery calcifications.  CHEST WALL AND LOWERNECK: Within normal limits.  VISUALIZED UPPER ABDOMEN: Hepatic steatosis.  BONES: Degenerative changes.    IMPRESSION:    1.  No pulmonary embolism.  2.  Bilateral patchy and groundglass opacities can occur in the setting of a variety of clinical settings including infection.  3.  The outpouching arising from the esophagus at thoracic inlet is suspicious for a Zenker's diverticulum.        --- End of Report ---    < end of copied text > .  LABS:                         14.8   13.35 )-----------( 172      ( 26 Sep 2021 10:52 )             43.0         136  |  100  |  19  ----------------------------<  155<H>  3.8   |  20<L>  |  1.03    Ca    9.5      26 Sep 2021 10:52    TPro  7.9  /  Alb  4.6  /  TBili  1.3<H>  /  DBili  x   /  AST  29  /  ALT  30  /  AlkPhos  66      PT/INR - ( 26 Sep 2021 10:52 )   PT: 12.8 sec;   INR: 1.13 ratio    PTT - ( 26 Sep 2021 10:52 )  PTT:27.6 sec    Troponin T, High Sensitivity Result: 12 ng/L (21 @ 12:18)  Troponin T, High Sensitivity Result: 12 ng/L (21 @ 10:55)    12:18 - VBG - pH: 7.36  | pCO2: 38    | pO2: 46    | Lactate: 1.5    10:52 - VBG - pH: 7.41  | pCO2: 37    | pO2: 45    | Lactate: 2.5      Urinalysis Basic - ( 26 Sep 2021 10:59 )  Color: Yellow / Appearance: Slightly Turbid / S.025 / pH: x  Gluc: x / Ketone: Negative  / Bili: Negative / Urobili: 3 mg/dL   Blood: x / Protein: 30 mg/dL / Nitrite: Negative   Leuk Esterase: Large / RBC: 1 /HPF /  /HPF   Sq Epi: x / Non Sq Epi: 10 /HPF / Bacteria: Few    RVP/COVID negative    RADIOLOGY, EKG & ADDITIONAL TESTS: Reviewed.    < from: CT Angio Chest PE Protocol w/ IV Cont (21 @ 14:59) >  LUNGS AND AIRWAYS: Patent central airways.  Bilateral patchy and groundglass opacities.  PLEURA: Pleura is normal.  MEDIASTINUM AND RANDALL: The chest lymph nodes measure less than 10 mm in the short axis. The thyroid gland is normal. Arising from the aorta at the level of the thoracic inlet is a 3.4 x 1.5 cm outpouching that extends posterior to the left of midline and contains fluid and air.  VESSELS: No, central, lobar, segmental, or subsegmental pulmonary embolism. Aorta is normal in caliber. Aortic calcification.  HEART: Heart size is normal. No pericardial effusion. Coronary artery calcifications.  CHEST WALL AND LOWER NECK: Within normal limits.  VISUALIZED UPPER ABDOMEN: Hepatic steatosis.  BONES: Degenerative changes.  IMPRESSION: 1.  No pulmonary embolism. 2.  Bilateral patchy and groundglass opacities can occur in the setting of a variety of clinical settings including infection. 3.  The outpouching arising from the esophagus at thoracic inlet is suspicious for a Zenker's diverticulum.  < end of copied text >    < from: Xray Chest 1 View- PORTABLE-Urgent (Xray Chest 1 View- PORTABLE-Urgent .) (21 @ 10:57) >  The cardiomediastinal silhouette is within normal limits. The upper lungs are clear. No bilateral pleural effusions. Increased interstitial markings and mild airspace opacities seen in both lower lungs - may be due to interstitial-type pulmonary edema - but underlying pneumonia not excluded in the right clinical setting. No pneumothorax. Degenerative changes of the thoracic spine.  IMPRESSION: Bilateral lower lung opacities concerning for pneumonia in the right clinical setting.  < end of copied text >    EKGs personally reviewed - initial EKG w/?aflutter 160bpm; repeat w/afib w/RVR, Q in III, QTc 475ms

## 2021-09-26 NOTE — ED ADULT NURSE REASSESSMENT NOTE - NS ED NURSE REASSESS COMMENT FT1
pt aox4, coughing, requesting cough medication. pt HR noted to increases to 150-160 while pt is coughing. pt oxygen saturation between 93-95%, as per MD EFRAIN Kelsey, pt to remain on RA at this time. pt denies chest pain.
pt aox4, breathing even, non labored. pt denies chest pain, SOB

## 2021-09-26 NOTE — H&P ADULT - PROBLEM SELECTOR PLAN 2
.  meets SIRS criteria, febrile to 104 F, tachy to 140s, with leukocytosis. Lactate 2.5 -> 1.5. Likely 2/2 PNA but given history of intermittent self-catheterization and positive UA, should r/o urine infection although pt denies any urinary complaints    - c/w broad-spectrum abx   - f/u blood cxs  - f/u urine cxs .  meets SIRS criteria, febrile to 104 F, tachy to 140s, with leukocytosis. Lactate 2.5 -> 1.5 s/p 2L NS bolus. Likely 2/2 PNA but given history of intermittent self-catheterization and positive UA, should r/o urine infection although pt denies any urinary complaints    - c/w broad-spectrum abx   - f/u blood cxs  - f/u urine cxs meets SIRS criteria, febrile to 104 F, tachy to 140s, with leukocytosis. Lactate 2.5 -> 1.5 s/p 2L NS bolus. Likely 2/2 PNA but given history of intermittent self-catheterization and positive UA, should r/o urine infection although pt denies any urinary complaints  - c/w broad-spectrum abx   - f/u blood cxs  - f/u urine cxs

## 2021-09-26 NOTE — H&P ADULT - PROBLEM SELECTOR PLAN 9
Diet: NPO pending SLP eval, aspiration precautions given possible aspiration PNA/zenker's   DVT ppx: lovenox 40mg sq  Dispo: home pending hospital course

## 2021-09-26 NOTE — H&P ADULT - PROBLEM SELECTOR PLAN 8
never hospitalized for asthma, never required intubation.  - c/w albuterol inhaler prn  - c/w home symbicort bid   - duonebs q6h prn SOB/wheezing  - incentive spirometry

## 2021-09-26 NOTE — ED PROVIDER NOTE - NS ED ROS FT
Gen: Denies weight loss  Skin: Denies rash, erythema, color changes  Resp: Denies SOB  Endo: Denies sensitivity to heat, cold, increased urination  GI: Denies diarrhea, constipation, nausea, vomiting  Msk: Denies back pain, LE swelling, extremity pain  : Denies dysuria, increased frequency  Neuro: Denies LOC, weakness

## 2021-09-26 NOTE — H&P ADULT - NSHPSOCIALHISTORY_GEN_ALL_CORE
Used to smoke 1 PPD but quit over 40 yrs ago. 1-2 drinks/glasses of wine a day. Denies illicit drug use

## 2021-09-26 NOTE — H&P ADULT - NSICDXPASTMEDICALHX_GEN_ALL_CORE_FT
PAST MEDICAL HISTORY:  Asthma     BPH (benign prostatic hyperplasia)     HTN (hypertension)     OA (osteoarthritis)     Spinal stenosis

## 2021-09-27 DIAGNOSIS — I48.91 UNSPECIFIED ATRIAL FIBRILLATION: ICD-10-CM

## 2021-09-27 DIAGNOSIS — I10 ESSENTIAL (PRIMARY) HYPERTENSION: ICD-10-CM

## 2021-09-27 DIAGNOSIS — Z29.9 ENCOUNTER FOR PROPHYLACTIC MEASURES, UNSPECIFIED: ICD-10-CM

## 2021-09-27 DIAGNOSIS — K22.5 DIVERTICULUM OF ESOPHAGUS, ACQUIRED: ICD-10-CM

## 2021-09-27 DIAGNOSIS — A41.9 SEPSIS, UNSPECIFIED ORGANISM: ICD-10-CM

## 2021-09-27 DIAGNOSIS — R79.89 OTHER SPECIFIED ABNORMAL FINDINGS OF BLOOD CHEMISTRY: ICD-10-CM

## 2021-09-27 DIAGNOSIS — N40.0 BENIGN PROSTATIC HYPERPLASIA WITHOUT LOWER URINARY TRACT SYMPTOMS: ICD-10-CM

## 2021-09-27 DIAGNOSIS — J45.909 UNSPECIFIED ASTHMA, UNCOMPLICATED: ICD-10-CM

## 2021-09-27 DIAGNOSIS — J96.01 ACUTE RESPIRATORY FAILURE WITH HYPOXIA: ICD-10-CM

## 2021-09-27 DIAGNOSIS — J18.9 PNEUMONIA, UNSPECIFIED ORGANISM: ICD-10-CM

## 2021-09-27 DIAGNOSIS — I48.92 UNSPECIFIED ATRIAL FLUTTER: ICD-10-CM

## 2021-09-27 DIAGNOSIS — I48.20 CHRONIC ATRIAL FIBRILLATION, UNSPECIFIED: ICD-10-CM

## 2021-09-27 LAB
ALBUMIN SERPL ELPH-MCNC: 4.5 G/DL — SIGNIFICANT CHANGE UP (ref 3.3–5)
ALP SERPL-CCNC: 57 U/L — SIGNIFICANT CHANGE UP (ref 40–120)
ALT FLD-CCNC: 26 U/L — SIGNIFICANT CHANGE UP (ref 4–41)
ANION GAP SERPL CALC-SCNC: 15 MMOL/L — HIGH (ref 7–14)
AST SERPL-CCNC: 27 U/L — SIGNIFICANT CHANGE UP (ref 4–40)
BASOPHILS # BLD AUTO: 0.06 K/UL — SIGNIFICANT CHANGE UP (ref 0–0.2)
BASOPHILS NFR BLD AUTO: 0.4 % — SIGNIFICANT CHANGE UP (ref 0–2)
BILIRUB SERPL-MCNC: 1.4 MG/DL — HIGH (ref 0.2–1.2)
BUN SERPL-MCNC: 15 MG/DL — SIGNIFICANT CHANGE UP (ref 7–23)
CALCIUM SERPL-MCNC: 9.1 MG/DL — SIGNIFICANT CHANGE UP (ref 8.4–10.5)
CHLORIDE SERPL-SCNC: 100 MMOL/L — SIGNIFICANT CHANGE UP (ref 98–107)
CO2 SERPL-SCNC: 21 MMOL/L — LOW (ref 22–31)
COVID-19 SPIKE DOMAIN AB INTERP: POSITIVE
COVID-19 SPIKE DOMAIN ANTIBODY RESULT: >250 U/ML — HIGH
CREAT SERPL-MCNC: 0.93 MG/DL — SIGNIFICANT CHANGE UP (ref 0.5–1.3)
EOSINOPHIL # BLD AUTO: 0.35 K/UL — SIGNIFICANT CHANGE UP (ref 0–0.5)
EOSINOPHIL NFR BLD AUTO: 2.2 % — SIGNIFICANT CHANGE UP (ref 0–6)
GLUCOSE SERPL-MCNC: 115 MG/DL — HIGH (ref 70–99)
GRAM STN FLD: SIGNIFICANT CHANGE UP
HCT VFR BLD CALC: 37.3 % — LOW (ref 39–50)
HCV AB S/CO SERPL IA: 0.15 S/CO — SIGNIFICANT CHANGE UP (ref 0–0.99)
HCV AB SERPL-IMP: SIGNIFICANT CHANGE UP
HGB BLD-MCNC: 12.6 G/DL — LOW (ref 13–17)
IANC: 13.57 K/UL — HIGH (ref 1.5–8.5)
IMM GRANULOCYTES NFR BLD AUTO: 0.7 % — SIGNIFICANT CHANGE UP (ref 0–1.5)
LYMPHOCYTES # BLD AUTO: 1 K/UL — SIGNIFICANT CHANGE UP (ref 1–3.3)
LYMPHOCYTES # BLD AUTO: 6.2 % — LOW (ref 13–44)
MAGNESIUM SERPL-MCNC: 1.5 MG/DL — LOW (ref 1.6–2.6)
MCHC RBC-ENTMCNC: 32 PG — SIGNIFICANT CHANGE UP (ref 27–34)
MCHC RBC-ENTMCNC: 33.8 GM/DL — SIGNIFICANT CHANGE UP (ref 32–36)
MCV RBC AUTO: 94.7 FL — SIGNIFICANT CHANGE UP (ref 80–100)
MONOCYTES # BLD AUTO: 1.15 K/UL — HIGH (ref 0–0.9)
MONOCYTES NFR BLD AUTO: 7.1 % — SIGNIFICANT CHANGE UP (ref 2–14)
NEUTROPHILS # BLD AUTO: 13.57 K/UL — HIGH (ref 1.8–7.4)
NEUTROPHILS NFR BLD AUTO: 83.4 % — HIGH (ref 43–77)
NRBC # BLD: 0 /100 WBCS — SIGNIFICANT CHANGE UP
NRBC # FLD: 0 K/UL — SIGNIFICANT CHANGE UP
PHOSPHATE SERPL-MCNC: 2.1 MG/DL — LOW (ref 2.5–4.5)
PLATELET # BLD AUTO: 156 K/UL — SIGNIFICANT CHANGE UP (ref 150–400)
POTASSIUM SERPL-MCNC: 3.3 MMOL/L — LOW (ref 3.5–5.3)
POTASSIUM SERPL-SCNC: 3.3 MMOL/L — LOW (ref 3.5–5.3)
PROT SERPL-MCNC: 7.4 G/DL — SIGNIFICANT CHANGE UP (ref 6–8.3)
RBC # BLD: 3.94 M/UL — LOW (ref 4.2–5.8)
RBC # FLD: 12.7 % — SIGNIFICANT CHANGE UP (ref 10.3–14.5)
SARS-COV-2 IGG+IGM SERPL QL IA: >250 U/ML — HIGH
SARS-COV-2 IGG+IGM SERPL QL IA: POSITIVE
SODIUM SERPL-SCNC: 136 MMOL/L — SIGNIFICANT CHANGE UP (ref 135–145)
SPECIMEN SOURCE: SIGNIFICANT CHANGE UP
WBC # BLD: 16.24 K/UL — HIGH (ref 3.8–10.5)
WBC # FLD AUTO: 16.24 K/UL — HIGH (ref 3.8–10.5)

## 2021-09-27 PROCEDURE — 99223 1ST HOSP IP/OBS HIGH 75: CPT | Mod: GC

## 2021-09-27 PROCEDURE — 99255 IP/OBS CONSLTJ NEW/EST HI 80: CPT

## 2021-09-27 PROCEDURE — 99222 1ST HOSP IP/OBS MODERATE 55: CPT

## 2021-09-27 RX ORDER — ZOLPIDEM TARTRATE 10 MG/1
1 TABLET ORAL
Qty: 0 | Refills: 0 | DISCHARGE

## 2021-09-27 RX ORDER — FINASTERIDE 5 MG/1
5 TABLET, FILM COATED ORAL DAILY
Refills: 0 | Status: DISCONTINUED | OUTPATIENT
Start: 2021-09-27 | End: 2021-10-01

## 2021-09-27 RX ORDER — BUDESONIDE AND FORMOTEROL FUMARATE DIHYDRATE 160; 4.5 UG/1; UG/1
2 AEROSOL RESPIRATORY (INHALATION)
Refills: 0 | Status: DISCONTINUED | OUTPATIENT
Start: 2021-09-27 | End: 2021-10-01

## 2021-09-27 RX ORDER — ACETAMINOPHEN 500 MG
2 TABLET ORAL
Qty: 0 | Refills: 0 | DISCHARGE

## 2021-09-27 RX ORDER — ZOLPIDEM TARTRATE 10 MG/1
5 TABLET ORAL ONCE
Refills: 0 | Status: DISCONTINUED | OUTPATIENT
Start: 2021-09-27 | End: 2021-09-27

## 2021-09-27 RX ORDER — ZOLPIDEM TARTRATE 10 MG/1
10 TABLET ORAL AT BEDTIME
Refills: 0 | Status: DISCONTINUED | OUTPATIENT
Start: 2021-09-27 | End: 2021-09-27

## 2021-09-27 RX ORDER — PIPERACILLIN AND TAZOBACTAM 4; .5 G/20ML; G/20ML
3.38 INJECTION, POWDER, LYOPHILIZED, FOR SOLUTION INTRAVENOUS ONCE
Refills: 0 | Status: DISCONTINUED | OUTPATIENT
Start: 2021-09-27 | End: 2021-09-27

## 2021-09-27 RX ORDER — VANCOMYCIN HCL 1 G
1000 VIAL (EA) INTRAVENOUS EVERY 12 HOURS
Refills: 0 | Status: DISCONTINUED | OUTPATIENT
Start: 2021-09-27 | End: 2021-09-27

## 2021-09-27 RX ORDER — LOSARTAN POTASSIUM 100 MG/1
25 TABLET, FILM COATED ORAL DAILY
Refills: 0 | Status: DISCONTINUED | OUTPATIENT
Start: 2021-09-27 | End: 2021-09-29

## 2021-09-27 RX ORDER — PANTOPRAZOLE SODIUM 20 MG/1
1 TABLET, DELAYED RELEASE ORAL
Qty: 0 | Refills: 0 | DISCHARGE

## 2021-09-27 RX ORDER — MONTELUKAST 4 MG/1
10 TABLET, CHEWABLE ORAL DAILY
Refills: 0 | Status: DISCONTINUED | OUTPATIENT
Start: 2021-09-27 | End: 2021-10-01

## 2021-09-27 RX ORDER — PIPERACILLIN AND TAZOBACTAM 4; .5 G/20ML; G/20ML
3.38 INJECTION, POWDER, LYOPHILIZED, FOR SOLUTION INTRAVENOUS EVERY 8 HOURS
Refills: 0 | Status: DISCONTINUED | OUTPATIENT
Start: 2021-09-27 | End: 2021-10-01

## 2021-09-27 RX ORDER — ASPIRIN/CALCIUM CARB/MAGNESIUM 324 MG
81 TABLET ORAL DAILY
Refills: 0 | Status: DISCONTINUED | OUTPATIENT
Start: 2021-09-27 | End: 2021-10-01

## 2021-09-27 RX ORDER — ALBUTEROL 90 UG/1
2 AEROSOL, METERED ORAL
Refills: 0 | Status: DISCONTINUED | OUTPATIENT
Start: 2021-09-27 | End: 2021-09-29

## 2021-09-27 RX ORDER — CHOLECALCIFEROL (VITAMIN D3) 125 MCG
1 CAPSULE ORAL
Qty: 0 | Refills: 0 | DISCHARGE

## 2021-09-27 RX ORDER — IPRATROPIUM/ALBUTEROL SULFATE 18-103MCG
3 AEROSOL WITH ADAPTER (GRAM) INHALATION EVERY 6 HOURS
Refills: 0 | Status: DISCONTINUED | OUTPATIENT
Start: 2021-09-27 | End: 2021-09-28

## 2021-09-27 RX ORDER — ASPIRIN/CALCIUM CARB/MAGNESIUM 324 MG
1 TABLET ORAL
Qty: 0 | Refills: 0 | DISCHARGE

## 2021-09-27 RX ORDER — NEBIVOLOL HYDROCHLORIDE 5 MG/1
10 TABLET ORAL DAILY
Refills: 0 | Status: DISCONTINUED | OUTPATIENT
Start: 2021-09-27 | End: 2021-10-01

## 2021-09-27 RX ORDER — HYDROCHLOROTHIAZIDE 25 MG
6.25 TABLET ORAL DAILY
Refills: 0 | Status: DISCONTINUED | OUTPATIENT
Start: 2021-09-27 | End: 2021-09-27

## 2021-09-27 RX ORDER — AZITHROMYCIN 500 MG/1
500 TABLET, FILM COATED ORAL EVERY 24 HOURS
Refills: 0 | Status: DISCONTINUED | OUTPATIENT
Start: 2021-09-28 | End: 2021-09-28

## 2021-09-27 RX ORDER — POTASSIUM CHLORIDE 20 MEQ
40 PACKET (EA) ORAL EVERY 4 HOURS
Refills: 0 | Status: COMPLETED | OUTPATIENT
Start: 2021-09-27 | End: 2021-09-27

## 2021-09-27 RX ORDER — DOCUSATE SODIUM 100 MG
1 CAPSULE ORAL
Qty: 0 | Refills: 0 | DISCHARGE

## 2021-09-27 RX ORDER — AZITHROMYCIN 500 MG/1
TABLET, FILM COATED ORAL
Refills: 0 | Status: DISCONTINUED | OUTPATIENT
Start: 2021-09-27 | End: 2021-09-28

## 2021-09-27 RX ORDER — AZITHROMYCIN 500 MG/1
500 TABLET, FILM COATED ORAL ONCE
Refills: 0 | Status: COMPLETED | OUTPATIENT
Start: 2021-09-27 | End: 2021-09-27

## 2021-09-27 RX ORDER — LOSARTAN POTASSIUM 100 MG/1
25 TABLET, FILM COATED ORAL DAILY
Refills: 0 | Status: DISCONTINUED | OUTPATIENT
Start: 2021-09-27 | End: 2021-09-27

## 2021-09-27 RX ORDER — ZOLPIDEM TARTRATE 10 MG/1
5 TABLET ORAL AT BEDTIME
Refills: 0 | Status: DISCONTINUED | OUTPATIENT
Start: 2021-09-27 | End: 2021-09-28

## 2021-09-27 RX ORDER — ENOXAPARIN SODIUM 100 MG/ML
40 INJECTION SUBCUTANEOUS EVERY 24 HOURS
Refills: 0 | Status: DISCONTINUED | OUTPATIENT
Start: 2021-09-27 | End: 2021-09-29

## 2021-09-27 RX ORDER — HYDROMORPHONE HYDROCHLORIDE 2 MG/ML
1 INJECTION INTRAMUSCULAR; INTRAVENOUS; SUBCUTANEOUS
Qty: 0 | Refills: 0 | DISCHARGE

## 2021-09-27 RX ORDER — IPRATROPIUM/ALBUTEROL SULFATE 18-103MCG
3 AEROSOL WITH ADAPTER (GRAM) INHALATION EVERY 6 HOURS
Refills: 0 | Status: DISCONTINUED | OUTPATIENT
Start: 2021-09-27 | End: 2021-09-29

## 2021-09-27 RX ADMIN — PIPERACILLIN AND TAZOBACTAM 25 GRAM(S): 4; .5 INJECTION, POWDER, LYOPHILIZED, FOR SOLUTION INTRAVENOUS at 11:22

## 2021-09-27 RX ADMIN — Medication 3 MILLILITER(S): at 23:33

## 2021-09-27 RX ADMIN — ENOXAPARIN SODIUM 40 MILLIGRAM(S): 100 INJECTION SUBCUTANEOUS at 11:02

## 2021-09-27 RX ADMIN — Medication 100 MILLIGRAM(S): at 14:49

## 2021-09-27 RX ADMIN — Medication 3 MILLILITER(S): at 16:42

## 2021-09-27 RX ADMIN — AZITHROMYCIN 500 MILLIGRAM(S): 500 TABLET, FILM COATED ORAL at 14:47

## 2021-09-27 RX ADMIN — BUDESONIDE AND FORMOTEROL FUMARATE DIHYDRATE 2 PUFF(S): 160; 4.5 AEROSOL RESPIRATORY (INHALATION) at 21:04

## 2021-09-27 RX ADMIN — BUDESONIDE AND FORMOTEROL FUMARATE DIHYDRATE 2 PUFF(S): 160; 4.5 AEROSOL RESPIRATORY (INHALATION) at 10:08

## 2021-09-27 RX ADMIN — Medication 40 MILLIEQUIVALENT(S): at 11:01

## 2021-09-27 RX ADMIN — ZOLPIDEM TARTRATE 5 MILLIGRAM(S): 10 TABLET ORAL at 01:14

## 2021-09-27 RX ADMIN — PIPERACILLIN AND TAZOBACTAM 25 GRAM(S): 4; .5 INJECTION, POWDER, LYOPHILIZED, FOR SOLUTION INTRAVENOUS at 19:18

## 2021-09-27 RX ADMIN — Medication 81 MILLIGRAM(S): at 11:01

## 2021-09-27 RX ADMIN — Medication 650 MILLIGRAM(S): at 12:20

## 2021-09-27 RX ADMIN — Medication 100 MILLIGRAM(S): at 21:02

## 2021-09-27 RX ADMIN — Medication 40 MILLIEQUIVALENT(S): at 13:04

## 2021-09-27 RX ADMIN — Medication 3 MILLIGRAM(S): at 02:37

## 2021-09-27 RX ADMIN — Medication 20 MILLIGRAM(S): at 21:03

## 2021-09-27 RX ADMIN — Medication 20 MILLIGRAM(S): at 13:04

## 2021-09-27 RX ADMIN — NEBIVOLOL HYDROCHLORIDE 10 MILLIGRAM(S): 5 TABLET ORAL at 05:08

## 2021-09-27 RX ADMIN — Medication 3 MILLILITER(S): at 13:05

## 2021-09-27 RX ADMIN — Medication 100 MILLIGRAM(S): at 08:28

## 2021-09-27 RX ADMIN — PIPERACILLIN AND TAZOBACTAM 25 GRAM(S): 4; .5 INJECTION, POWDER, LYOPHILIZED, FOR SOLUTION INTRAVENOUS at 03:02

## 2021-09-27 RX ADMIN — Medication 250 MILLIGRAM(S): at 07:02

## 2021-09-27 RX ADMIN — Medication 650 MILLIGRAM(S): at 11:01

## 2021-09-27 RX ADMIN — ZOLPIDEM TARTRATE 5 MILLIGRAM(S): 10 TABLET ORAL at 00:00

## 2021-09-27 RX ADMIN — FINASTERIDE 5 MILLIGRAM(S): 5 TABLET, FILM COATED ORAL at 11:01

## 2021-09-27 NOTE — CONSULT NOTE ADULT - ASSESSMENT
75yo M w/ PMH HTN, A-fib, asthma, BPH, and chronic back pain (herniated discs, spinal stenosis) presents with cough x 1 now, worsening over the past few days now with fever (Tmax 103 at home), SOB, sputum production, and sore throat. In ED, was febrile to 104 F and tachy to 140s with leukocytosis of 13 and elevated lactate to 2.5 Now s/p 2L NS bolus, lactate downtrended to 1.5. CXR showed b/l lower lung opacities c/f PNA. CTA chest showed b/l patchy, ground glass opacities that may represent infection, as well as, an outpouching of the esophagus near thoracic inlet suspicious for Zenker's diverticulum. No PE. Of note pt, also does self-catheterizations at home, without any urinary complaints. UA showing 187 WBCs, large LE, neg nitrite. Admitted to medicine for likely PNA, pending other infectious workup.

## 2021-09-27 NOTE — CONSULT NOTE ADULT - ASSESSMENT
76 year old man with A-fib (not on AC), aspirin use, hypertension, asthma on multiple inhalers, chronic back pain (herniated discs, spinal stenosis), h/o TURP with intermittent urinary catheterization presents for progressive cough, dyspnea and fever 103 F, found to have multilobar pneumonia likely from esophageal diverticulum on CTA chest    Impression:    # Esophageal diverticulum -  Incidental finding at esophageal inlet on CT chest; associated with regurgitation, chronic cough and likely microaspiration, no halitosis or dysphagia.   # Hypoxic respiratory failure due to multilobular pneumonia - Suspect aspiration in setting of Zenker's diverticulum  # Sepsis due to multilobar aspiration pneumonia - On IV antibiotics and supplemental oxygen  # A fib not on anticoagulation  # Asthma    Plan:  - X-ray esophagram for further imaging of Zenker's diverticulum when PNA resolved  - IV antibiotics and supplemental O2 per primary team  - dietary recommendations as per SLP evaluation  - HOB elevated and other aspiration precautions  - will discuss endoscopic repair of Zenker's pending further imaging     Allyn Huston PGY-6  Gastroenterology/Hepatology Fellow  Page #[phone]   Page #[phone] 5pm-7am on weekdays, and on weekends

## 2021-09-27 NOTE — CONSULT NOTE ADULT - SUBJECTIVE AND OBJECTIVE BOX
Chief Complaint:  Patient is a 76y old  Male who presents with a chief complaint of PNA (27 Sep 2021 13:00)      HPI:    76 year old man with A-fib (not on AC), aspirin use, hypertension, asthma on multiple inhalers, chronic back pain (herniated discs, spinal stenosis), h/o TURP with intermittent urinary catheterization presents for progressive cough, dyspnea and fever 103 F, found to have bilateral ground glass opacities with esophageal diverticulum on CT abdomen/pelvis. Patient currently being managed for multilobar pneumonia likely from aspiration due to Zenker's diverticulum. [facility] was consulted for endoscopic repair of Zenker's diverticulum.  Wife at bedside provides collateral history, reports patient has had chronic cough with eating or oral secretions for almost one year. Patient saw PMD/Pulmonologist who attributed cough to chronic reflux or side effect of ARB/antihypertensive. She also reports audible gurgling coming from throat. Patient reports tightening sensation at throat and occasional regurgitation of small pieces of (undigested) food. He denies odynophagia or globus sensation. Wife reports patient has been treated twice for pneumonia following anesthesia required for prior surgical procedures.       Allergies:  No Known Allergies      Home Medications:    · 	Bystolic 10 mg oral tablet: 1 tab(s) orally once a day  · 	Ambien 10 mg oral tablet: 1 tab(s) orally once a day (at bedtime), As Needed insomnia   · 	losartan-hydrochlorothiazide 50 mg-12.5 mg oral tablet: 0.5 tab(s) orally once a day  · 	aspirin 81 mg oral tablet: 1 tab(s) orally once a day  · 	Vitamin D3 25 mcg (1000 intl units) oral tablet: 1 tab(s) orally once a day  · 	finasteride 5 mg oral tablet: 1 tab(s) orally once a day  · 	Symbicort 160 mcg-4.5 mcg/inh inhalation aerosol: 2 puff(s) inhaled 2 times a day    Hospital Medications:  acetaminophen   Tablet .. 650 milliGRAM(s) Oral every 6 hours PRN  ALBUTerol    90 MICROgram(s) HFA Inhaler 2 Puff(s) Inhalation four times a day PRN  albuterol/ipratropium for Nebulization 3 milliLiter(s) Nebulizer every 6 hours  albuterol/ipratropium for Nebulization 3 milliLiter(s) Nebulizer every 6 hours  aluminum hydroxide/magnesium hydroxide/simethicone Suspension 30 milliLiter(s) Oral every 4 hours PRN  aspirin enteric coated 81 milliGRAM(s) Oral daily  azithromycin  IVPB      azithromycin  IVPB 500 milliGRAM(s) IV Intermittent once  budesonide 160 MICROgram(s)/formoterol 4.5 MICROgram(s) Inhaler 2 Puff(s) Inhalation two times a day  budesonide 160 MICROgram(s)/formoterol 4.5 MICROgram(s) Inhaler 2 Puff(s) Inhalation two times a day  enoxaparin Injectable 40 milliGRAM(s) SubCutaneous every 24 hours  finasteride 5 milliGRAM(s) Oral daily  guaiFENesin Oral Liquid (Sugar-Free) 100 milliGRAM(s) Oral every 6 hours PRN  losartan 25 milliGRAM(s) Oral daily  melatonin 3 milliGRAM(s) Oral at bedtime PRN  methylPREDNISolone sodium succinate Injectable 20 milliGRAM(s) IV Push every 8 hours  montelukast 10 milliGRAM(s) Oral daily  nebivolol 10 milliGRAM(s) Oral daily  ondansetron Injectable 4 milliGRAM(s) IV Push every 8 hours PRN  piperacillin/tazobactam IVPB.. 3.375 Gram(s) IV Intermittent every 8 hours  vancomycin  IVPB 1000 milliGRAM(s) IV Intermittent every 12 hours  zolpidem 5 milliGRAM(s) Oral at bedtime PRN  zolpidem 5 milliGRAM(s) Oral at bedtime PRN      PMHX/PSHX:  HTN (hypertension)    OA (osteoarthritis)    Asthma    Spinal stenosis    Asthma    BPH (benign prostatic hyperplasia)    S/p total knee replacement, bilateral        Family history:  No pertinent family history in first degree relatives    FH: lung cancer (Mother)    FH: type 2 diabetes (Father)    Family history of CKD (chronic kidney disease) (Father)        Social History:     ROS:     General:  No weight loss, fevers, chills, night sweats, fatigue  Eyes:  No vision changes, no yellowing of eyes   ENT:  No throat pain, runny nose  CV:  No chest pain, palpitations  Resp:  No SOB, cough, wheezing  GI:  See HPI  :  No burning with urination, no hematuria   Muscle:  No muscle pain, weakness  Neuro:  No numbness/tingling, memory problems  Psych:  No fatigue, insomnia, mood problems  Heme:  No easy bruisability  Skin:  No rash, itching       PHYSICAL EXAM:     GENERAL:  Sitting up to chair, eating, no distress  HEENT:  NC/AT,  conjunctivae clear and pink,  no JVD  CHEST:  No increased effort +supplemental oxygen  HEART:  Regular rhythm & rate   ABDOMEN:  Soft, non-tender, non-distended  EXTREMITIES:  no LE edema  SKIN:  No rash/erythema/ecchymoses/petechiae/wounds/jaundice  NEURO:  Alert, orientedx3    Vital Signs:  Vital Signs Last 24 Hrs  T(C): 36.9 (27 Sep 2021 13:59), Max: 37.2 (27 Sep 2021 05:06)  T(F): 98.4 (27 Sep 2021 13:59), Max: 99 (27 Sep 2021 05:06)  HR: 83 (27 Sep 2021 13:59) (80 - 99)  BP: 160/70 (27 Sep 2021 13:59) (127/72 - 160/70)  BP(mean): --  RR: 19 (27 Sep 2021 13:59) (16 - 22)  SpO2: 94% (27 Sep 2021 13:59) (93% - 99%)  Daily     Daily     LABS:                        12.6   16.24 )-----------( 156      ( 27 Sep 2021 07:58 )             37.3         136  |  100  |  15  ----------------------------<  115<H>  3.3<L>   |  21<L>  |  0.93    Ca    9.1      27 Sep 2021 07:58  Phos  2.1       Mg     1.50         TPro  7.4  /  Alb  4.5  /  TBili  1.4<H>  /  DBili  x   /  AST  27  /  ALT  26  /  AlkPhos  57  -27    LIVER FUNCTIONS - ( 27 Sep 2021 07:58 )  Alb: 4.5 g/dL / Pro: 7.4 g/dL / ALK PHOS: 57 U/L / ALT: 26 U/L / AST: 27 U/L / GGT: x           PT/INR - ( 26 Sep 2021 10:52 )   PT: 12.8 sec;   INR: 1.13 ratio    PTT - ( 26 Sep 2021 10:52 )  PTT:27.6 sec  Urinalysis Basic - ( 26 Sep 2021 10:59 )    Color: Yellow / Appearance: Slightly Turbid / S.025 / pH: x  Gluc: x / Ketone: Negative  / Bili: Negative / Urobili: 3 mg/dL   Blood: x / Protein: 30 mg/dL / Nitrite: Negative   Leuk Esterase: Large / RBC: 1 /HPF /  /HPF   Sq Epi: x / Non Sq Epi: 10 /HPF / Bacteria: Few    Imaging:    < from: CT Angio Chest PE Protocol w/ IV Cont (21 @ 14:59) >  EXAM:  CT ANGIO CHEST PULM ART [facility]        PROCEDURE DATE:  Sep 26 2021         INTERPRETATION:  CLINICAL INFORMATION: Shortness of breath, hypertension, asthma with cough and headache for 3 to 4 days. Chest pain. Question pulmonary embolism.    COMPARISON: CTA chest 3/2/2017. Radiograph chest 2021.    CONTRAST/COMPLICATIONS:  IV Contrast: Omnipaque 350  90 cc administered   10 cc discarded  Oral Contrast: NONE  Complications: None reported at time of study completion    PROCEDURE:  CT Angiography of the Chest.  Sagittal and coronal reformats were performed as well as 3D (MIP) reconstructions.    FINDINGS:    LUNGS AND AIRWAYS: Patent central airways.  Bilateral patchy and groundglass opacities.  PLEURA: Pleura is normal.  MEDIASTINUMAND RANDALL: The chest lymph nodes measure less than 10 mm in the short axis. The thyroid gland is normal. Arising from the aorta at the level of the thoracic inlet is a 3.4 x 1.5 cm outpouching that extends posterior to the left of midline and containsfluid and air.  VESSELS: No, central, lobar, segmental, or subsegmental pulmonary embolism. Aorta is normal in caliber. Aortic calcification.  HEART: Heart size is normal. No pericardial effusion. Coronary artery calcifications.  CHEST WALL AND LOWERNECK: Within normal limits.  VISUALIZED UPPER ABDOMEN: Hepatic steatosis.  BONES: Degenerative changes.    IMPRESSION:    1.  No pulmonary embolism.  2.  Bilateral patchy and groundglass opacities can occur in the setting of a variety of clinical settings including infection.  3.  The outpouching arising from the esophagus at thoracic inlet is suspicious for a Zenker's diverticulum.        --- End of Report ---            AVINASH CURRY MD; Resident Radiology  This document has been electronicallysigned.  WYATT SCOTT MD; Attending Radiologist  This document has been electronically signed. Sep 26 2021  3:30PM                 Chief Complaint:  Patient is a 76y old  Male who presents with a chief complaint of PNA (27 Sep 2021 13:00)      HPI:    76 year old man with A-fib (not on AC), aspirin use, hypertension, asthma on multiple inhalers, chronic back pain (herniated discs, spinal stenosis), h/o TURP with intermittent urinary catheterization presents for progressive cough, dyspnea and fever 103 F, found to have bilateral ground glass opacities with esophageal diverticulum on CT abdomen/pelvis. Patient currently being managed for multilobar pneumonia likely from aspiration due to Zenker's diverticulum. [facility] was consulted for endoscopic repair of Zenker's diverticulum.  Wife at bedside provides collateral history, reports patient has had chronic cough with eating or oral secretions for almost one year. Patient saw PMD/Pulmonologist who attributed cough to chronic reflux or side effect of ARB/antihypertensive. She also reports audible gurgling coming from throat. Patient reports tightening sensation at throat and occasional regurgitation of small pieces of (undigested) food. He denies odynophagia or globus sensation. Wife reports patient has been treated twice for pneumonia following anesthesia required for prior surgical procedures.       Allergies:  No Known Allergies      Home Medications:    · 	Bystolic 10 mg oral tablet: 1 tab(s) orally once a day  · 	Ambien 10 mg oral tablet: 1 tab(s) orally once a day (at bedtime), As Needed insomnia   · 	losartan-hydrochlorothiazide 50 mg-12.5 mg oral tablet: 0.5 tab(s) orally once a day  · 	aspirin 81 mg oral tablet: 1 tab(s) orally once a day  · 	Vitamin D3 25 mcg (1000 intl units) oral tablet: 1 tab(s) orally once a day  · 	finasteride 5 mg oral tablet: 1 tab(s) orally once a day  · 	Symbicort 160 mcg-4.5 mcg/inh inhalation aerosol: 2 puff(s) inhaled 2 times a day    Hospital Medications:  acetaminophen   Tablet .. 650 milliGRAM(s) Oral every 6 hours PRN  ALBUTerol    90 MICROgram(s) HFA Inhaler 2 Puff(s) Inhalation four times a day PRN  albuterol/ipratropium for Nebulization 3 milliLiter(s) Nebulizer every 6 hours  albuterol/ipratropium for Nebulization 3 milliLiter(s) Nebulizer every 6 hours  aluminum hydroxide/magnesium hydroxide/simethicone Suspension 30 milliLiter(s) Oral every 4 hours PRN  aspirin enteric coated 81 milliGRAM(s) Oral daily  azithromycin  IVPB      azithromycin  IVPB 500 milliGRAM(s) IV Intermittent once  budesonide 160 MICROgram(s)/formoterol 4.5 MICROgram(s) Inhaler 2 Puff(s) Inhalation two times a day  budesonide 160 MICROgram(s)/formoterol 4.5 MICROgram(s) Inhaler 2 Puff(s) Inhalation two times a day  enoxaparin Injectable 40 milliGRAM(s) SubCutaneous every 24 hours  finasteride 5 milliGRAM(s) Oral daily  guaiFENesin Oral Liquid (Sugar-Free) 100 milliGRAM(s) Oral every 6 hours PRN  losartan 25 milliGRAM(s) Oral daily  melatonin 3 milliGRAM(s) Oral at bedtime PRN  methylPREDNISolone sodium succinate Injectable 20 milliGRAM(s) IV Push every 8 hours  montelukast 10 milliGRAM(s) Oral daily  nebivolol 10 milliGRAM(s) Oral daily  ondansetron Injectable 4 milliGRAM(s) IV Push every 8 hours PRN  piperacillin/tazobactam IVPB.. 3.375 Gram(s) IV Intermittent every 8 hours  vancomycin  IVPB 1000 milliGRAM(s) IV Intermittent every 12 hours  zolpidem 5 milliGRAM(s) Oral at bedtime PRN  zolpidem 5 milliGRAM(s) Oral at bedtime PRN      PMHX/PSHX:  HTN (hypertension)    OA (osteoarthritis)    Asthma    Spinal stenosis    Asthma    BPH (benign prostatic hyperplasia)    S/p total knee replacement, bilateral        Family history:  No pertinent family history in first degree relatives    FH: lung cancer (Mother)    FH: type 2 diabetes (Father)    Family history of CKD (chronic kidney disease) (Father)        Social History: no sig social hx    ROS:     General:  No weight loss, fevers, chills, night sweats, fatigue  Eyes:  No vision changes, no yellowing of eyes   ENT:  No throat pain, runny nose  CV:  No chest pain, palpitations  Resp:  No SOB, cough, wheezing  GI:  See HPI  :  No burning with urination, no hematuria   Muscle:  No muscle pain, weakness  Neuro:  No numbness/tingling, memory problems  Psych:  No fatigue, insomnia, mood problems  Heme:  No easy bruisability  Skin:  No rash, itching       PHYSICAL EXAM:     GENERAL:  Sitting up to chair, eating, no distress  HEENT:  NC/AT,  conjunctivae clear and pink,  no JVD  CHEST:  No increased effort +supplemental oxygen  HEART:  Regular rhythm & rate   ABDOMEN:  Soft, non-tender, non-distended  EXTREMITIES:  no LE edema  SKIN:  No rash/erythema/ecchymoses/petechiae/wounds/jaundice  NEURO:  Alert, orientedx3    Vital Signs:  Vital Signs Last 24 Hrs  T(C): 36.9 (27 Sep 2021 13:59), Max: 37.2 (27 Sep 2021 05:06)  T(F): 98.4 (27 Sep 2021 13:59), Max: 99 (27 Sep 2021 05:06)  HR: 83 (27 Sep 2021 13:59) (80 - 99)  BP: 160/70 (27 Sep 2021 13:59) (127/72 - 160/70)  BP(mean): --  RR: 19 (27 Sep 2021 13:59) (16 - 22)  SpO2: 94% (27 Sep 2021 13:59) (93% - 99%)  Daily     Daily     LABS:                        12.6   16.24 )-----------( 156      ( 27 Sep 2021 07:58 )             37.3         136  |  100  |  15  ----------------------------<  115<H>  3.3<L>   |  21<L>  |  0.93    Ca    9.1      27 Sep 2021 07:58  Phos  2.1       Mg     1.50         TPro  7.4  /  Alb  4.5  /  TBili  1.4<H>  /  DBili  x   /  AST  27  /  ALT  26  /  AlkPhos  57  -27    LIVER FUNCTIONS - ( 27 Sep 2021 07:58 )  Alb: 4.5 g/dL / Pro: 7.4 g/dL / ALK PHOS: 57 U/L / ALT: 26 U/L / AST: 27 U/L / GGT: x           PT/INR - ( 26 Sep 2021 10:52 )   PT: 12.8 sec;   INR: 1.13 ratio    PTT - ( 26 Sep 2021 10:52 )  PTT:27.6 sec  Urinalysis Basic - ( 26 Sep 2021 10:59 )    Color: Yellow / Appearance: Slightly Turbid / S.025 / pH: x  Gluc: x / Ketone: Negative  / Bili: Negative / Urobili: 3 mg/dL   Blood: x / Protein: 30 mg/dL / Nitrite: Negative   Leuk Esterase: Large / RBC: 1 /HPF /  /HPF   Sq Epi: x / Non Sq Epi: 10 /HPF / Bacteria: Few    Imaging:    < from: CT Angio Chest PE Protocol w/ IV Cont (21 @ 14:59) >  EXAM:  CT ANGIO CHEST PULM ART [facility]        PROCEDURE DATE:  Sep 26 2021         INTERPRETATION:  CLINICAL INFORMATION: Shortness of breath, hypertension, asthma with cough and headache for 3 to 4 days. Chest pain. Question pulmonary embolism.    COMPARISON: CTA chest 3/2/2017. Radiograph chest 2021.    CONTRAST/COMPLICATIONS:  IV Contrast: Omnipaque 350  90 cc administered   10 cc discarded  Oral Contrast: NONE  Complications: None reported at time of study completion    PROCEDURE:  CT Angiography of the Chest.  Sagittal and coronal reformats were performed as well as 3D (MIP) reconstructions.    FINDINGS:    LUNGS AND AIRWAYS: Patent central airways.  Bilateral patchy and groundglass opacities.  PLEURA: Pleura is normal.  MEDIASTINUMAND RANDALL: The chest lymph nodes measure less than 10 mm in the short axis. The thyroid gland is normal. Arising from the aorta at the level of the thoracic inlet is a 3.4 x 1.5 cm outpouching that extends posterior to the left of midline and containsfluid and air.  VESSELS: No, central, lobar, segmental, or subsegmental pulmonary embolism. Aorta is normal in caliber. Aortic calcification.  HEART: Heart size is normal. No pericardial effusion. Coronary artery calcifications.  CHEST WALL AND LOWERNECK: Within normal limits.  VISUALIZED UPPER ABDOMEN: Hepatic steatosis.  BONES: Degenerative changes.    IMPRESSION:    1.  No pulmonary embolism.  2.  Bilateral patchy and groundglass opacities can occur in the setting of a variety of clinical settings including infection.  3.  The outpouching arising from the esophagus at thoracic inlet is suspicious for a Zenker's diverticulum.        --- End of Report ---            AVINASH CURRY MD; Resident Radiology  This document has been electronicallysigned.  WYATT SCOTT MD; Attending Radiologist  This document has been electronically signed. Sep 26 2021  3:30PM

## 2021-09-27 NOTE — CONSULT NOTE ADULT - SUBJECTIVE AND OBJECTIVE BOX
"HPI:  75yo M w/ PMH HTN, A-fib (on ASA only), asthma, BPH, and chronic back pain (herniated discs, spinal stenosis) presents with worsening cough a/w fever and SOB. Pt states that he has been coughing for over a month now with a sore throat, worsening over the past 3-4 days with increasing sputum production and difficulty breathing. Spiked a 103 F fever today. Patient has a history of asthma and is supposed to use his symbicort 2x a day that he's not very compliant with. But since he's been coughing, he's been using it twice a day. Patient reports chest tightness and HA during coughing fits. No n/v/d, abdominal pain, CP, or recent travel. Has been hospitalized twice in the past for pneumonia (5 years ago, in setting of post-op). Never been hospitalized for asthma or required intubation.   He also has a sore throat that started after he started coughing. Denies difficulty swallowing but has noticed intermittent coughing while eating over the past 6 months. Denies foul breath/odor.   Of note, patient has a h/o BPH and had a TURP 10 yrs ago c/b ?nerve injury- so he has been doing intermittent self-catheterizations for the past 10 yrs, max 2x a day, less if he's home and can conveniently use the bathroom. Have not noticed any worsening urinary frequency or other urinary sxs. Denies dysuria, urinary urgency, hematuria.     In the ED, given 2L IV NS bolus, zosyn/vanc x 1, and Tylenol.    (26 Sep 2021 23:29)"    Above reviewed. 77 yo M with A Fib, asthma, back pain, presenting with fever. Patient has cough/sob. No rhinorrhea. Has cough with some sputum production. Developed fever so presented to hospital. No unusual exposures, no sick contacts. Chest tightness. ID called for further eval.    PAST MEDICAL & SURGICAL HISTORY:  HTN (hypertension)    OA (osteoarthritis)    Spinal stenosis    Asthma    BPH (benign prostatic hyperplasia)    S/p total knee replacement, bilateral    Allergies    No Known Allergies    Intolerances    ANTIMICROBIALS:  azithromycin  IVPB    piperacillin/tazobactam IVPB.. 3.375 every 8 hours  vancomycin  IVPB 1000 every 12 hours    OTHER MEDS:  acetaminophen   Tablet .. 650 milliGRAM(s) Oral every 6 hours PRN  ALBUTerol    90 MICROgram(s) HFA Inhaler 2 Puff(s) Inhalation four times a day PRN  albuterol/ipratropium for Nebulization 3 milliLiter(s) Nebulizer every 6 hours  albuterol/ipratropium for Nebulization 3 milliLiter(s) Nebulizer every 6 hours  aluminum hydroxide/magnesium hydroxide/simethicone Suspension 30 milliLiter(s) Oral every 4 hours PRN  aspirin enteric coated 81 milliGRAM(s) Oral daily  budesonide 160 MICROgram(s)/formoterol 4.5 MICROgram(s) Inhaler 2 Puff(s) Inhalation two times a day  budesonide 160 MICROgram(s)/formoterol 4.5 MICROgram(s) Inhaler 2 Puff(s) Inhalation two times a day  enoxaparin Injectable 40 milliGRAM(s) SubCutaneous every 24 hours  finasteride 5 milliGRAM(s) Oral daily  guaiFENesin Oral Liquid (Sugar-Free) 100 milliGRAM(s) Oral every 6 hours PRN  losartan 25 milliGRAM(s) Oral daily  melatonin 3 milliGRAM(s) Oral at bedtime PRN  methylPREDNISolone sodium succinate Injectable 20 milliGRAM(s) IV Push every 8 hours  montelukast 10 milliGRAM(s) Oral daily  nebivolol 10 milliGRAM(s) Oral daily  ondansetron Injectable 4 milliGRAM(s) IV Push every 8 hours PRN  zolpidem 5 milliGRAM(s) Oral at bedtime PRN  zolpidem 5 milliGRAM(s) Oral at bedtime PRN    SOCIAL HISTORY: No tobacco, no alcohol, no illicit drugs    FAMILY HISTORY:  FH: lung cancer (Mother)    FH: type 2 diabetes (Father)    Family history of CKD (chronic kidney disease) (Father)    Drug Dosing Weight  Height (cm): 177.8 (26 Sep 2021 09:54)  Weight (kg): 77.1 (26 Sep 2021 10:58)  BMI (kg/m2): 24.4 (26 Sep 2021 10:58)  BSA (m2): 1.95 (26 Sep 2021 10:58)    PE:    Vital Signs Last 24 Hrs  T(C): 36.9 (27 Sep 2021 13:59), Max: 37.2 (27 Sep 2021 05:06)  T(F): 98.4 (27 Sep 2021 13:59), Max: 99 (27 Sep 2021 05:06)  HR: 83 (27 Sep 2021 13:59) (80 - 99)  BP: 160/70 (27 Sep 2021 13:59) (127/72 - 160/70)  RR: 19 (27 Sep 2021 13:59) (16 - 22)  SpO2: 94% (27 Sep 2021 13:59) (93% - 99%)    Gen: AOx3, NAD, non-toxic  CV: S1+S2 normal, nontachycardic  Resp: Clear bilat, no resp distress, no crackles/wheezes, NC 5L  Abd: Soft, nontender, +BS  Ext: No LE edema, no wounds  : No Avila  IV/Skin: No thrombophlebitis  Msk: No low back pain, no arthralgias, no joint swelling  Neuro: No sensory deficits, no motor deficits    LABS:                        12.6   16.24 )-----------( 156      ( 27 Sep 2021 07:58 )             37.3         136  |  100  |  15  ----------------------------<  115<H>  3.3<L>   |  21<L>  |  0.93    Ca    9.1      27 Sep 2021 07:58  Phos  2.1       Mg     1.50         TPro  7.4  /  Alb  4.5  /  TBili  1.4<H>  /  DBili  x   /  AST  27  /  ALT  26  /  AlkPhos  57      Urinalysis Basic - ( 26 Sep 2021 10:59 )    Color: Yellow / Appearance: Slightly Turbid / S.025 / pH: x  Gluc: x / Ketone: Negative  / Bili: Negative / Urobili: 3 mg/dL   Blood: x / Protein: 30 mg/dL / Nitrite: Negative   Leuk Esterase: Large / RBC: 1 /HPF /  /HPF   Sq Epi: x / Non Sq Epi: 10 /HPF / Bacteria: Few    MICROBIOLOGY:    Rapid RVP Result: Joelletec ( @ 11:03)    RADIOLOGY:     CT:    IMPRESSION:    1.  No pulmonary embolism.  2.  Bilateral patchy and groundglass opacities can occur in the setting of a variety of clinical settings including infection.  3.  The outpouching arising from the esophagus at thoracic inlet is suspicious for a Zenker's diverticulum.

## 2021-09-27 NOTE — CONSULT NOTE ADULT - PROBLEM SELECTOR RECOMMENDATION 9
DVT propahyxlis he has asthma and has been wheezing: In addition he smokes daily too: and drinks plus coughs a lot: covid is negative: did take covid vaccination: He may have developed COPD too: he has been wheezing a lot and his decreased air entry : need steroids and to cont BD as well as adv him strongly to stop smoking: he has asthma and has been wheezing: In addition he smokes daily too: and drinks plus coughs a lot: covid is negative: did take covid vaccination: He may have developed COPD too: he has been wheezing a lot and his decreased air entry : need steroids and to cont BD as well as adv him strongly to stop smoking: chances of aspiration are high too: add Zithromax: till legionella is ruled out: DVT prophylaxis

## 2021-09-27 NOTE — CONSULT NOTE ADULT - ASSESSMENT
75 yo M with A Fib, asthma, back pain, presenting with fever  Fever, leukocytosis  CT with opacities suspicion for infection  No atypical exposures, no sick contacts  RVP negative  UA+  Cultures pending  Treat for bacterial pna, improving with antibiotic?  Overall,  1) Bacterial pneumonia  - Zosyn 3.375g q 8  - Azithro 500mg q 24  - DC Vanco  - Ordered sputum culture, urine legionella  - O2 supplementation per primary team  - Further workup if not improving  2) Leukocytosis  - Trend to normal  - F/U UCX, BCX  - Abx as above  3) Fever  - F/U pending BCX    Gee Mullen MD  Pager [phone]  From 5pm-9am, and on weekends call [phone] 77 yo M with A Fib, asthma, back pain, presenting with fever  Fever, leukocytosis  CT with opacities suspicion for infection  No atypical exposures, no sick contacts  RVP negative  UA+  Cultures pending  Treat for bacterial pna, improving with antibiotic?  Overall,  1) Bacterial pneumonia  - Zenkers cause for aspiration?  - Zosyn 3.375g q 8  - Azithro 500mg q 24  - DC Vanco  - Ordered sputum culture, urine legionella  - O2 supplementation per primary team  - Further workup if not improving  2) Leukocytosis  - Trend to normal  - F/U UCX, BCX  - Abx as above  3) Fever  - F/U pending BCX    Gee Mullen MD  Pager [phone]  From 5pm-9am, and on weekends call [phone]

## 2021-09-27 NOTE — CONSULT NOTE ADULT - SUBJECTIVE AND OBJECTIVE BOX
09-27-21 @ 12:39    Patient is a 76y old  Male who presents with a chief complaint of PNA (26 Sep 2021 23:29)      HPI:  77yo M w/ PMH HTN, A-fib (on ASA only), asthma, BPH, and chronic back pain (herniated discs, spinal stenosis) presents with worsening cough a/w fever and SOB. Pt states that he has been coughing for over a month now with a sore throat, worsening over the past 3-4 days with increasing sputum production and difficulty breathing. Spiked a 103 F fever today. Patient has a history of asthma and is supposed to use his symbicort 2x a day that he's not very compliant with. But since he's been coughing, he's been using it twice a day. Patient reports chest tightness and HA during coughing fits. No n/v/d, abdominal pain, CP, or recent travel. Has been hospitalized twice in the past for pneumonia (5 years ago, in setting of post-op). Never been hospitalized for asthma or required intubation.   He also has a sore throat that started after he started coughing. Denies difficulty swallowing but has noticed intermittent coughing while eating over the past 6 months. Denies foul breath/odor.   Of note, patient has a h/o BPH and had a TURP 10 yrs ago c/b ?nerve injury- so he has been doing intermittent self-catheterizations for the past 10 yrs, max 2x a day, less if he's home and can conveniently use the bathroom. Have not noticed any worsening urinary frequency or other urinary sxs. Denies dysuria, urinary urgency, hematuria.     In the ED, given 2L IV NS bolus, zosyn/vanc x 1, and Tylenol.    (26 Sep 2021 23:29)    he  has asthma and ahve keyon SOB for past one month: His coughing has increased over past one month too:  He smokes cigars and drinks daily:         ?FOLLOWING PRESENT  [x ] Hx of PE/DVT, [x ] Hx COPD, y[ ] Hx of Asthma, [y ] Hx of Hospitalization, [ x]  Hx of BiPAP/CPAP use, x[ ] Hx of SADE    Allergies    No Known Allergies    Intolerances        PAST MEDICAL & SURGICAL HISTORY:  HTN (hypertension)    OA (osteoarthritis)    Spinal stenosis    Asthma    BPH (benign prostatic hyperplasia)    S/p total knee replacement, bilateral        FAMILY HISTORY:  FH: lung cancer (Mother)    FH: type 2 diabetes (Father)    Family history of CKD (chronic kidney disease) (Father)        Social History: [smoker: > 30 years: currently cigars  ] TOBACCO                  [ drinks vodka daily ] ETOH                                 [x  ] IVDA/DRUGS    REVIEW OF SYSTEMS      General:	fever    Skin/Breast:x  	  Ophthalmologic:x  	  ENMT:	x    Respiratory and Thorax: cough, SOB, wheezing  	  Cardiovascular:	x    Gastrointestinal:	x    Genitourinary:	x    Musculoskeletal:	x    Neurological:	x    Psychiatric:	x    Hematology/Lymphatics:	x    Endocrine:	x    Allergic/Immunologic:	x    MEDICATIONS  (STANDING):  albuterol/ipratropium for Nebulization 3 milliLiter(s) Nebulizer every 6 hours  aspirin enteric coated 81 milliGRAM(s) Oral daily  budesonide 160 MICROgram(s)/formoterol 4.5 MICROgram(s) Inhaler 2 Puff(s) Inhalation two times a day  enoxaparin Injectable 40 milliGRAM(s) SubCutaneous every 24 hours  finasteride 5 milliGRAM(s) Oral daily  losartan 25 milliGRAM(s) Oral daily  nebivolol 10 milliGRAM(s) Oral daily  piperacillin/tazobactam IVPB.. 3.375 Gram(s) IV Intermittent every 8 hours  potassium chloride    Tablet ER 40 milliEquivalent(s) Oral every 4 hours  vancomycin  IVPB 1000 milliGRAM(s) IV Intermittent every 12 hours    MEDICATIONS  (PRN):  acetaminophen   Tablet .. 650 milliGRAM(s) Oral every 6 hours PRN Temp greater or equal to 38.5C (101.3F), Mild Pain (1 - 3)  ALBUTerol    90 MICROgram(s) HFA Inhaler 2 Puff(s) Inhalation four times a day PRN Shortness of Breath and/or Wheezing  aluminum hydroxide/magnesium hydroxide/simethicone Suspension 30 milliLiter(s) Oral every 4 hours PRN Dyspepsia  guaiFENesin Oral Liquid (Sugar-Free) 100 milliGRAM(s) Oral every 6 hours PRN Cough  melatonin 3 milliGRAM(s) Oral at bedtime PRN Insomnia  ondansetron Injectable 4 milliGRAM(s) IV Push every 8 hours PRN Nausea and/or Vomiting  zolpidem 5 milliGRAM(s) Oral at bedtime PRN Insomnia  zolpidem 5 milliGRAM(s) Oral at bedtime PRN Insomnia       Vital Signs Last 24 Hrs  T(C): 37.2 (27 Sep 2021 05:06), Max: 37.2 (27 Sep 2021 05:06)  T(F): 99 (27 Sep 2021 05:06), Max: 99 (27 Sep 2021 05:06)  HR: 99 (27 Sep 2021 05:06) (80 - 99)  BP: 151/89 (27 Sep 2021 05:06) (127/72 - 154/79)  BP(mean): --  RR: 19 (27 Sep 2021 05:06) (16 - 22)  SpO2: 93% (27 Sep 2021 05:06) (93% - 99%)Orthostatic VS          I&O's Summary      Physical Exam:   GENERAL: NAD, well-groomed, well-developed  HEENT: AZUCENA/   Atraumatic, Normocephalic  ENMT: No tonsillar erythema, exudates, or enlargement; Moist mucous membranes, Good dentition, No lesions  NECK: Supple, No JVD, Normal thyroid  CHEST/LUNG: very poor air entry: wheezing: ++  CVS: Mils SM+  GI: : Soft, Nontender, Nondistended; Bowel sounds present  NERVOUS SYSTEM:  Alert & Oriented X3  EXTREMITIES: - edema  LYMPH: No lymphadenopathy noted  SKIN: No rashes or lesions  ENDOCRINOLOGY: No Thyromegaly  PSYCH: Appropriate    Labs:  -3.6<38<4>>46<<7.365>>-3.6<<3><<4><<5<<469>>, -0.8<37<4>>45<<7.415>>-0.8<<3><<4><<5<<459>>SARS-CoV-2: NotDetec (26 Sep 2021 11:03)                              12.6   16.24 )-----------( 156      ( 27 Sep 2021 07:58 )             37.3                         14.8   13.35 )-----------( 172      ( 26 Sep 2021 10:52 )             43.0     09-27    136  |  100  |  15  ----------------------------<  115<H>  3.3<L>   |  21<L>  |  0.93      136  |  100  |  19  ----------------------------<  155<H>  3.8   |  20<L>  |  1.03    Ca    9.1      27 Sep 2021 07:58  Ca    9.5      26 Sep 2021 10:52  Phos  2.1       Mg     1.50         TPro  7.4  /  Alb  4.5  /  TBili  1.4<H>  /  DBili  x   /  AST  27  /  ALT  26  /  AlkPhos  57    TPro  7.9  /  Alb  4.6  /  TBili  1.3<H>  /  DBili  x   /  AST  29  /  ALT  30  /  AlkPhos  66      CAPILLARY BLOOD GLUCOSE        LIVER FUNCTIONS - ( 27 Sep 2021 07:58 )  Alb: 4.5 g/dL / Pro: 7.4 g/dL / ALK PHOS: 57 U/L / ALT: 26 U/L / AST: 27 U/L / GGT: x           PT/INR - ( 26 Sep 2021 10:52 )   PT: 12.8 sec;   INR: 1.13 ratio         PTT - ( 26 Sep 2021 10:52 )  PTT:27.6 sec  Urinalysis Basic - ( 26 Sep 2021 10:59 )    Color: Yellow / Appearance: Slightly Turbid / S.025 / pH: x  Gluc: x / Ketone: Negative  / Bili: Negative / Urobili: 3 mg/dL   Blood: x / Protein: 30 mg/dL / Nitrite: Negative   Leuk Esterase: Large / RBC: 1 /HPF /  /HPF   Sq Epi: x / Non Sq Epi: 10 /HPF / Bacteria: Few      D DImer      Studies  Chest X-RAY  CT SCAN Chest   CT Abdomen  Venous Dopplers: LE:   Others      rad< from: CT Angio Chest PE Protocol w/ IV Cont (21 @ 14:59) >    INTERPRETATION:  CLINICAL INFORMATION: Shortness of breath, hypertension, asthma with cough and headache for 3 to 4 days. Chest pain. Question pulmonary embolism.    COMPARISON: CTA chest 3/2/2017. Radiograph chest 2021.    CONTRAST/COMPLICATIONS:  IV Contrast: Omnipaque 350  90 cc administered   10 cc discarded  Oral Contrast: NONE  Complications: None reported at time of study completion    PROCEDURE:  CT Angiography of the Chest.  Sagittal and coronal reformats were performed as well as 3D (MIP) reconstructions.    FINDINGS:    LUNGS AND AIRWAYS: Patent central airways.  Bilateral patchy and groundglass opacities.  PLEURA: Pleura is normal.  MEDIASTINUMAND RANDALL: The chest lymph nodes measure less than 10 mm in the short axis. The thyroid gland is normal. Arising from the aorta at the level of the thoracic inlet is a 3.4 x 1.5 cm outpouching that extends posterior to the left of midline and containsfluid and air.  VESSELS: No, central, lobar, segmental, or subsegmental pulmonary embolism. Aorta is normal in caliber. Aortic calcification.  HEART: Heart size is normal. No pericardial effusion. Coronary artery calcifications.  CHEST WALL AND LOWERNECK: Within normal limits.  VISUALIZED UPPER ABDOMEN: Hepatic steatosis.  BONES: Degenerative changes.    IMPRESSION:    1.  No pulmonary embolism.  2.  Bilateral patchy and groundglass opacities can occur in the setting of a variety of clinical settings including infection.  3.  The outpouching arising from the esophagus at thoracic inlet is suspicious for a Zenker's diverticulum.        --- End of Report ---            AVINASH CURRY MD; Resident Radiology  This document has been electronicallysigned.  WYATT SCOTT MD; Attending Radiologist  This document has been electronically signed. Sep 26 2021  3:30PM    < end of copied text >

## 2021-09-27 NOTE — CONSULT NOTE ADULT - SUBJECTIVE AND OBJECTIVE BOX
Patient is a 76y old  Male who presents with a chief complaint of PNA (27 Sep 2021 12:55)     .     HPI:  HPI:  77yo M w/ PMH HTN, A-fib (on ASA only), asthma, BPH, and chronic back pain (herniated discs, spinal stenosis) presents with worsening cough a/w fever and SOB. Pt states that he has been coughing for over a month now with a sore throat, worsening over the past 3-4 days with increasing sputum production and difficulty breathing. Spiked a 103 F fever today. Patient has a history of asthma and is supposed to use his symbicort 2x a day that he's not very compliant with. But since he's been coughing, he's been using it twice a day. Patient reports chest tightness and HA during coughing fits. No n/v/d, abdominal pain, CP, or recent travel. Has been hospitalized twice in the past for pneumonia (5 years ago, in setting of post-op). Never been hospitalized for asthma or required intubation.   He also has a sore throat that started after he started coughing. Denies difficulty swallowing but has noticed intermittent coughing while eating over the past 6 months. Denies foul breath/odor.   Of note, patient has a h/o BPH and had a TURP 10 yrs ago c/b ?nerve injury- so he has been doing intermittent self-catheterizations for the past 10 yrs, max 2x a day, less if he's home and can conveniently use the bathroom. Have not noticed any worsening urinary frequency or other urinary sxs. Denies dysuria, urinary urgency, hematuria.     In the ED, given 2L IV NS bolus, zosyn/vanc x 1, and Tylenol.    (26 Sep 2021 23:29)            REVIEW OF SYSTEMS  Constitutional:   No fever, no fatigue, no pallor, no night sweats, no weight loss.  HEENT:   No eye pain, no vision changes, no icterus, no mouth ulcers.  Respiratory:   + shortness of breath, no cough, no respiratory distress.   Cardiovascular:   No chest pain, no palpitations.   Gastrointestinal: No abdominal pain, no nausea, no vomiting , no diahrrea, no constipation, no hematochezia,no melena.  Skin:   No rashes, no jaundice, no eczema.   Musculoskeletal:   No joint pain, no swelling, no myalgia.   Neurologic:   No headache, no seizure, no weakness.   Genitourinary:   No dysuria, no decreased urine output.  Psychiatric:  No depression, no anxiety,   Endocrine:   No thyroid disease, no diabetes.  Heme/Lymphatic:   No anemia, no blood transfusions, no lymph node enlargement, no bleeding, no bruising.  ___________________________________________________________________________________________  Allergies    No Known Allergies    Intolerances      MEDICATIONS  (STANDING):  albuterol/ipratropium for Nebulization 3 milliLiter(s) Nebulizer every 6 hours  albuterol/ipratropium for Nebulization 3 milliLiter(s) Nebulizer every 6 hours  aspirin enteric coated 81 milliGRAM(s) Oral daily  azithromycin  IVPB      budesonide 160 MICROgram(s)/formoterol 4.5 MICROgram(s) Inhaler 2 Puff(s) Inhalation two times a day  budesonide 160 MICROgram(s)/formoterol 4.5 MICROgram(s) Inhaler 2 Puff(s) Inhalation two times a day  enoxaparin Injectable 40 milliGRAM(s) SubCutaneous every 24 hours  finasteride 5 milliGRAM(s) Oral daily  losartan 25 milliGRAM(s) Oral daily  methylPREDNISolone sodium succinate Injectable 20 milliGRAM(s) IV Push every 8 hours  montelukast 10 milliGRAM(s) Oral daily  nebivolol 10 milliGRAM(s) Oral daily  piperacillin/tazobactam IVPB.. 3.375 Gram(s) IV Intermittent every 8 hours  potassium chloride    Tablet ER 40 milliEquivalent(s) Oral every 4 hours  vancomycin  IVPB 1000 milliGRAM(s) IV Intermittent every 12 hours    MEDICATIONS  (PRN):  acetaminophen   Tablet .. 650 milliGRAM(s) Oral every 6 hours PRN Temp greater or equal to 38.5C (101.3F), Mild Pain (1 - 3)  ALBUTerol    90 MICROgram(s) HFA Inhaler 2 Puff(s) Inhalation four times a day PRN Shortness of Breath and/or Wheezing  aluminum hydroxide/magnesium hydroxide/simethicone Suspension 30 milliLiter(s) Oral every 4 hours PRN Dyspepsia  guaiFENesin Oral Liquid (Sugar-Free) 100 milliGRAM(s) Oral every 6 hours PRN Cough  melatonin 3 milliGRAM(s) Oral at bedtime PRN Insomnia  ondansetron Injectable 4 milliGRAM(s) IV Push every 8 hours PRN Nausea and/or Vomiting  zolpidem 5 milliGRAM(s) Oral at bedtime PRN Insomnia  zolpidem 5 milliGRAM(s) Oral at bedtime PRN Insomnia      PAST MEDICAL & SURGICAL HISTORY:  HTN (hypertension)    OA (osteoarthritis)    Spinal stenosis    Asthma    BPH (benign prostatic hyperplasia)    S/p total knee replacement, bilateral      FAMILY HISTORY:  FH: lung cancer (Mother)    FH: type 2 diabetes (Father)    Family history of CKD (chronic kidney disease) (Father)      Social History: No hsitory of : Tobacco use, IVDA, EToH  ______________________________________________________________________________________    PHYSICAL EXAM    Daily     Daily   BMI: 24.4 (09-26 @ 10:58)  Change in Weight:  Vital Signs Last 24 Hrs  T(C): 37.2 (27 Sep 2021 05:06), Max: 37.2 (27 Sep 2021 05:06)  T(F): 99 (27 Sep 2021 05:06), Max: 99 (27 Sep 2021 05:06)  HR: 99 (27 Sep 2021 05:06) (80 - 99)  BP: 151/89 (27 Sep 2021 05:06) (127/72 - 154/79)  BP(mean): --  RR: 19 (27 Sep 2021 05:06) (16 - 22)  SpO2: 93% (27 Sep 2021 05:06) (93% - 99%)    General:  Well developed, well nourished, alert and active, no pallor, NAD.  HEENT:    Normal appearance of conjunctiva, ears, nose, lips, oropharynx, and oral mucosa, anicteric.  Neck:  No masses, no asymmetry.  Lymph Nodes:  No lymphadenopathy.   Cardiovascular:  RRR normal S1/S2, no murmur.  Respiratory:  CTA B/L, normal respiratory effort.   Abdominal:   soft, no masses or tenderness, normoactive BS, NT/ND, no HSM.  Extremities:   No clubbing or cyanosis, normal capillary refill, no edema.   Skin:   No rash, jaundice, lesions, eczema.   Musculoskeletal:  No joint swelling, erythema or tenderness.   Neuro: No focal deficits.   Other:   _______________________________________________________________________________________________  Lab Results:                          12.6   16.24 )-----------( 156      ( 27 Sep 2021 07:58 )             37.3     09-27    136  |  100  |  15  ----------------------------<  115<H>  3.3<L>   |  21<L>  |  0.93    Ca    9.1      27 Sep 2021 07:58  Phos  2.1     09-27  Mg     1.50     09-27    TPro  7.4  /  Alb  4.5  /  TBili  1.4<H>  /  DBili  x   /  AST  27  /  ALT  26  /  AlkPhos  57  09-27    LIVER FUNCTIONS - ( 27 Sep 2021 07:58 )  Alb: 4.5 g/dL / Pro: 7.4 g/dL / ALK PHOS: 57 U/L / ALT: 26 U/L / AST: 27 U/L / GGT: x           PT/INR - ( 26 Sep 2021 10:52 )   PT: 12.8 sec;   INR: 1.13 ratio         PTT - ( 26 Sep 2021 10:52 )  PTT:27.6 sec        Stool Results:          RADIOLOGY RESULTS:  < from: CT Angio Chest PE Protocol w/ IV Cont (09.26.21 @ 14:59) >    EXAM:  CT ANGIO CHEST PULM ART [facility]        PROCEDURE DATE:  Sep 26 2021         INTERPRETATION:  CLINICAL INFORMATION: Shortness of breath, hypertension, asthma with cough and headache for 3 to 4 days. Chest pain. Question pulmonary embolism.    COMPARISON: CTA chest 3/2/2017. Radiograph chest 9/26/2021.    CONTRAST/COMPLICATIONS:  IV Contrast: Omnipaque 350  90 cc administered   10 cc discarded  Oral Contrast: NONE  Complications: None reported at time of study completion    PROCEDURE:  CT Angiography of the Chest.  Sagittal and coronal reformats were performed as well as 3D (MIP) reconstructions.    FINDINGS:    LUNGS AND AIRWAYS: Patent central airways.  Bilateral patchy and groundglass opacities.  PLEURA: Pleura is normal.  MEDIASTINUMAND RANDALL: The chest lymph nodes measure less than 10 mm in the short axis. The thyroid gland is normal. Arising from the aorta at the level of the thoracic inlet is a 3.4 x 1.5 cm outpouching that extends posterior to the left of midline and containsfluid and air.  VESSELS: No, central, lobar, segmental, or subsegmental pulmonary embolism. Aorta is normal in caliber. Aortic calcification.  HEART: Heart size is normal. No pericardial effusion. Coronary artery calcifications.  CHEST WALL AND LOWERNECK: Within normal limits.  VISUALIZED UPPER ABDOMEN: Hepatic steatosis.  BONES: Degenerative changes.    IMPRESSION:    1.  No pulmonary embolism.  2.  Bilateral patchy and groundglass opacities can occur in the setting of a variety of clinical settings including infection.  3.  The outpouching arising from the esophagus at thoracic inlet is suspicious for a Zenker's diverticulum.        --- End of Report ---            AVINASH CURRY MD; Resident Radiology  This document has been electronicallysigned.  WYATT SCOTT MD; Attending Radiologist  This document has been electronically signed. Sep 26 2021  3:30PM    < end of copied text >    SURGICAL PATHOLOGY:

## 2021-09-27 NOTE — CONSULT NOTE ADULT - ASSESSMENT
76 year old male with fever, cough and dysphagia       Dysphagia   Likely 2/2 to his Zenker's Diverticulum.   Surgery has been called however an alternative may be to fix the Zenker diverticulum endoscopically ( I will discuss with advanced GI)   He will need an barium esophagram once he is clinically improved     Pneumonia/ SOB   Anbx and management as per primary team

## 2021-09-27 NOTE — PROGRESS NOTE ADULT - SUBJECTIVE AND OBJECTIVE BOX
patient not known to me, assigned this AM to assume care  chart reviewed and events thus far noted  admitted overnight by faculty hospitalist    seen and examined  above noted and personally confirmed as accurate by me   agree with plan outlined in H&P by overnight hospitalist colleague     pulmonary help requested  VBG ordered  GI consultation requested for Zenker's diverticulum  ID help for antibiotics  continue vancomycin and piperacillin/tazobactam IV for now     discussed with patient in detail, expresses understanding of treatment plans.  discussed with pulmonary   discussed with patient's wife at bedside in detail

## 2021-09-28 LAB
ANION GAP SERPL CALC-SCNC: 15 MMOL/L — HIGH (ref 7–14)
BASE EXCESS BLDV CALC-SCNC: -0.2 MMOL/L — SIGNIFICANT CHANGE UP (ref -2–3)
BLOOD GAS VENOUS COMPREHENSIVE RESULT: SIGNIFICANT CHANGE UP
BUN SERPL-MCNC: 15 MG/DL — SIGNIFICANT CHANGE UP (ref 7–23)
CALCIUM SERPL-MCNC: 10.1 MG/DL — SIGNIFICANT CHANGE UP (ref 8.4–10.5)
CHLORIDE BLDV-SCNC: 102 MMOL/L — SIGNIFICANT CHANGE UP (ref 96–108)
CHLORIDE SERPL-SCNC: 101 MMOL/L — SIGNIFICANT CHANGE UP (ref 98–107)
CO2 BLDV-SCNC: 27.3 MMOL/L — HIGH (ref 22–26)
CO2 SERPL-SCNC: 23 MMOL/L — SIGNIFICANT CHANGE UP (ref 22–31)
CREAT SERPL-MCNC: 0.86 MG/DL — SIGNIFICANT CHANGE UP (ref 0.5–1.3)
GAS PNL BLDV: 137 MMOL/L — SIGNIFICANT CHANGE UP (ref 136–145)
GLUCOSE BLDV-MCNC: 166 MG/DL — HIGH (ref 70–99)
GLUCOSE SERPL-MCNC: 157 MG/DL — HIGH (ref 70–99)
HCO3 BLDV-SCNC: 26 MMOL/L — SIGNIFICANT CHANGE UP (ref 22–29)
HCT VFR BLD CALC: 37.8 % — LOW (ref 39–50)
HCT VFR BLDA CALC: 39 % — SIGNIFICANT CHANGE UP (ref 39–51)
HGB BLD CALC-MCNC: 13 G/DL — SIGNIFICANT CHANGE UP (ref 13–17)
HGB BLD-MCNC: 12.8 G/DL — LOW (ref 13–17)
LACTATE BLDV-MCNC: 2.3 MMOL/L — HIGH (ref 0.5–2)
LEGIONELLA AG UR QL: NEGATIVE — SIGNIFICANT CHANGE UP
LEGIONELLA AG UR QL: NEGATIVE — SIGNIFICANT CHANGE UP
MAGNESIUM SERPL-MCNC: 1.9 MG/DL — SIGNIFICANT CHANGE UP (ref 1.6–2.6)
MCHC RBC-ENTMCNC: 31.8 PG — SIGNIFICANT CHANGE UP (ref 27–34)
MCHC RBC-ENTMCNC: 33.9 GM/DL — SIGNIFICANT CHANGE UP (ref 32–36)
MCV RBC AUTO: 93.8 FL — SIGNIFICANT CHANGE UP (ref 80–100)
NRBC # BLD: 0 /100 WBCS — SIGNIFICANT CHANGE UP
NRBC # FLD: 0 K/UL — SIGNIFICANT CHANGE UP
PCO2 BLDV: 47 MMHG — SIGNIFICANT CHANGE UP (ref 42–55)
PH BLDV: 7.35 — SIGNIFICANT CHANGE UP (ref 7.32–7.43)
PHOSPHATE SERPL-MCNC: 2.4 MG/DL — LOW (ref 2.5–4.5)
PLATELET # BLD AUTO: 184 K/UL — SIGNIFICANT CHANGE UP (ref 150–400)
PO2 BLDV: 38 MMHG — SIGNIFICANT CHANGE UP
POTASSIUM BLDV-SCNC: 3.6 MMOL/L — SIGNIFICANT CHANGE UP (ref 3.5–5.1)
POTASSIUM SERPL-MCNC: 3.7 MMOL/L — SIGNIFICANT CHANGE UP (ref 3.5–5.3)
POTASSIUM SERPL-SCNC: 3.7 MMOL/L — SIGNIFICANT CHANGE UP (ref 3.5–5.3)
RBC # BLD: 4.03 M/UL — LOW (ref 4.2–5.8)
RBC # FLD: 12.6 % — SIGNIFICANT CHANGE UP (ref 10.3–14.5)
SAO2 % BLDV: 64.6 % — SIGNIFICANT CHANGE UP
SODIUM SERPL-SCNC: 139 MMOL/L — SIGNIFICANT CHANGE UP (ref 135–145)
TSH SERPL-MCNC: 0.53 UIU/ML — SIGNIFICANT CHANGE UP (ref 0.27–4.2)
WBC # BLD: 19.08 K/UL — HIGH (ref 3.8–10.5)
WBC # FLD AUTO: 19.08 K/UL — HIGH (ref 3.8–10.5)

## 2021-09-28 PROCEDURE — 99232 SBSQ HOSP IP/OBS MODERATE 35: CPT | Mod: GC

## 2021-09-28 PROCEDURE — 99233 SBSQ HOSP IP/OBS HIGH 50: CPT

## 2021-09-28 RX ORDER — ZOLPIDEM TARTRATE 10 MG/1
10 TABLET ORAL AT BEDTIME
Refills: 0 | Status: DISCONTINUED | OUTPATIENT
Start: 2021-09-28 | End: 2021-10-01

## 2021-09-28 RX ORDER — ZOLPIDEM TARTRATE 10 MG/1
5 TABLET ORAL ONCE
Refills: 0 | Status: DISCONTINUED | OUTPATIENT
Start: 2021-09-28 | End: 2021-09-28

## 2021-09-28 RX ADMIN — BUDESONIDE AND FORMOTEROL FUMARATE DIHYDRATE 2 PUFF(S): 160; 4.5 AEROSOL RESPIRATORY (INHALATION) at 21:47

## 2021-09-28 RX ADMIN — ZOLPIDEM TARTRATE 5 MILLIGRAM(S): 10 TABLET ORAL at 00:20

## 2021-09-28 RX ADMIN — Medication 20 MILLIGRAM(S): at 15:00

## 2021-09-28 RX ADMIN — BUDESONIDE AND FORMOTEROL FUMARATE DIHYDRATE 2 PUFF(S): 160; 4.5 AEROSOL RESPIRATORY (INHALATION) at 09:20

## 2021-09-28 RX ADMIN — PIPERACILLIN AND TAZOBACTAM 25 GRAM(S): 4; .5 INJECTION, POWDER, LYOPHILIZED, FOR SOLUTION INTRAVENOUS at 18:43

## 2021-09-28 RX ADMIN — PIPERACILLIN AND TAZOBACTAM 25 GRAM(S): 4; .5 INJECTION, POWDER, LYOPHILIZED, FOR SOLUTION INTRAVENOUS at 12:07

## 2021-09-28 RX ADMIN — Medication 20 MILLIGRAM(S): at 06:14

## 2021-09-28 RX ADMIN — ENOXAPARIN SODIUM 40 MILLIGRAM(S): 100 INJECTION SUBCUTANEOUS at 12:06

## 2021-09-28 RX ADMIN — Medication 81 MILLIGRAM(S): at 12:06

## 2021-09-28 RX ADMIN — Medication 100 MILLIGRAM(S): at 12:07

## 2021-09-28 RX ADMIN — PIPERACILLIN AND TAZOBACTAM 25 GRAM(S): 4; .5 INJECTION, POWDER, LYOPHILIZED, FOR SOLUTION INTRAVENOUS at 02:54

## 2021-09-28 RX ADMIN — NEBIVOLOL HYDROCHLORIDE 10 MILLIGRAM(S): 5 TABLET ORAL at 06:14

## 2021-09-28 RX ADMIN — MONTELUKAST 10 MILLIGRAM(S): 4 TABLET, CHEWABLE ORAL at 12:07

## 2021-09-28 RX ADMIN — Medication 3 MILLILITER(S): at 09:20

## 2021-09-28 RX ADMIN — Medication 100 MILLIGRAM(S): at 18:50

## 2021-09-28 RX ADMIN — ZOLPIDEM TARTRATE 10 MILLIGRAM(S): 10 TABLET ORAL at 23:31

## 2021-09-28 RX ADMIN — Medication 3 MILLILITER(S): at 21:46

## 2021-09-28 RX ADMIN — ZOLPIDEM TARTRATE 5 MILLIGRAM(S): 10 TABLET ORAL at 00:32

## 2021-09-28 RX ADMIN — LOSARTAN POTASSIUM 25 MILLIGRAM(S): 100 TABLET, FILM COATED ORAL at 06:15

## 2021-09-28 RX ADMIN — Medication 20 MILLIGRAM(S): at 23:31

## 2021-09-28 RX ADMIN — Medication 3 MILLILITER(S): at 15:14

## 2021-09-28 RX ADMIN — FINASTERIDE 5 MILLIGRAM(S): 5 TABLET, FILM COATED ORAL at 12:06

## 2021-09-28 NOTE — PROGRESS NOTE ADULT - SUBJECTIVE AND OBJECTIVE BOX
Chief Complaint:  Patient is a 76y old  Male who presents with a chief complaint of PNA (27 Sep 2021 15:08)      Interval Events:     O2 requirements increased this morning  Electing for outpatient follow up for evaluation for endoscopic repair of Zenker's diverticulum    Allergies:  No Known Allergies      Hospital Medications:  acetaminophen   Tablet .. 650 milliGRAM(s) Oral every 6 hours PRN  ALBUTerol    90 MICROgram(s) HFA Inhaler 2 Puff(s) Inhalation four times a day PRN  albuterol/ipratropium for Nebulization 3 milliLiter(s) Nebulizer every 6 hours  aluminum hydroxide/magnesium hydroxide/simethicone Suspension 30 milliLiter(s) Oral every 4 hours PRN  aspirin enteric coated 81 milliGRAM(s) Oral daily  azithromycin  IVPB      azithromycin  IVPB 500 milliGRAM(s) IV Intermittent every 24 hours  budesonide 160 MICROgram(s)/formoterol 4.5 MICROgram(s) Inhaler 2 Puff(s) Inhalation two times a day  budesonide 160 MICROgram(s)/formoterol 4.5 MICROgram(s) Inhaler 2 Puff(s) Inhalation two times a day  enoxaparin Injectable 40 milliGRAM(s) SubCutaneous every 24 hours  finasteride 5 milliGRAM(s) Oral daily  guaiFENesin Oral Liquid (Sugar-Free) 100 milliGRAM(s) Oral every 6 hours PRN  losartan 25 milliGRAM(s) Oral daily  melatonin 3 milliGRAM(s) Oral at bedtime PRN  methylPREDNISolone sodium succinate Injectable 20 milliGRAM(s) IV Push every 8 hours  montelukast 10 milliGRAM(s) Oral daily  nebivolol 10 milliGRAM(s) Oral daily  ondansetron Injectable 4 milliGRAM(s) IV Push every 8 hours PRN  piperacillin/tazobactam IVPB.. 3.375 Gram(s) IV Intermittent every 8 hours  zolpidem 5 milliGRAM(s) Oral at bedtime PRN  zolpidem 5 milliGRAM(s) Oral at bedtime PRN      PMHX/PSHX:  HTN (hypertension)    OA (osteoarthritis)    Asthma    Spinal stenosis    Asthma    BPH (benign prostatic hyperplasia)    S/p total knee replacement, bilateral        Family history:  No pertinent family history in first degree relatives    FH: lung cancer (Mother)    FH: type 2 diabetes (Father)    Family history of CKD (chronic kidney disease) (Father)        ROS:     General:  No weight loss, fevers, chills, night sweats, fatigue   Eyes:  No vision changes  ENT:  No sore throat, pain, runny nose  CV:  No chest pain, palpitations, dizziness   Resp:  No SOB, cough, wheezing  GI:  See HPI  :  No burning with urination, hematuria  Muscle:  No pain, weakness  Neuro:  No weakness/tingling, memory problems  Psych:  No fatigue, insomnia, mood problems, depression  Heme:  No easy bruisability  Skin:  No rash, edema      PHYSICAL EXAM:     GENERAL:  Sitting up to chair, eating, no distress  HEENT:  NC/AT,  conjunctivae clear and pink,  no JVD  CHEST:  No increased effort +supplemental oxygen  HEART:  Regular rhythm & rate   ABDOMEN:  Soft, non-tender, non-distended  EXTREMITIES:  no LE edema  SKIN:  No rash/erythema/ecchymoses/petechiae/wounds/jaundice  NEURO:  Alert, orientedx3      Vital Signs:  Vital Signs Last 24 Hrs  T(C): 37.1 (28 Sep 2021 06:14), Max: 37.2 (27 Sep 2021 21:03)  T(F): 98.7 (28 Sep 2021 06:14), Max: 98.9 (27 Sep 2021 21:03)  HR: 89 (28 Sep 2021 06:14) (74 - 94)  BP: 151/86 (28 Sep 2021 06:14) (151/86 - 163/65)  BP(mean): --  RR: 18 (28 Sep 2021 06:14) (18 - 24)  SpO2: 96% (28 Sep 2021 06:14) (94% - 98%)  Daily     Daily     LABS:                        12.8   19.08 )-----------( 184      ( 28 Sep 2021 07:34 )             37.8     -    139  |  101  |  15  ----------------------------<  157<H>  3.7   |  23  |  0.86    Ca    10.1      28 Sep 2021 07:34  Phos  2.4     -  Mg     1.90     -    TPro  7.4  /  Alb  4.5  /  TBili  1.4<H>  /  DBili  x   /  AST  27  /  ALT  26  /  AlkPhos  57  -    LIVER FUNCTIONS - ( 27 Sep 2021 07:58 )  Alb: 4.5 g/dL / Pro: 7.4 g/dL / ALK PHOS: 57 U/L / ALT: 26 U/L / AST: 27 U/L / GGT: x           PT/INR - ( 26 Sep 2021 10:52 )   PT: 12.8 sec;   INR: 1.13 ratio    PTT - ( 26 Sep 2021 10:52 )  PTT:27.6 sec  Urinalysis Basic - ( 26 Sep 2021 10:59 )    Color: Yellow / Appearance: Slightly Turbid / S.025 / pH: x  Gluc: x / Ketone: Negative  / Bili: Negative / Urobili: 3 mg/dL   Blood: x / Protein: 30 mg/dL / Nitrite: Negative   Leuk Esterase: Large / RBC: 1 /HPF /  /HPF   Sq Epi: x / Non Sq Epi: 10 /HPF / Bacteria: Few      Imaging:

## 2021-09-28 NOTE — PROGRESS NOTE ADULT - PROBLEM SELECTOR PLAN 8
never hospitalized for asthma, never required intubation.  - c/w albuterol inhaler prn  - c/w home symbicort bid   - duonebs q6h prn SOB/wheezing  - incentive spirometry on solumedrol IV  much improved air entry today  will continue to monitor

## 2021-09-28 NOTE — PROGRESS NOTE ADULT - NSPROGADDITIONALINFOA_GEN_ALL_CORE
patient is adamant re discharge  I spoke to his wife and she does not want him to leave the hospital  I explained to the wife that he is not medically ready for discharge   he states he will sign out AMA    discussed with patient in detail, expresses understanding of treatment plans.  discussed with wife over the phone in detail   discussed with covering ACP

## 2021-09-28 NOTE — PROGRESS NOTE ADULT - ASSESSMENT
77 yo M with A Fib, asthma, back pain, presenting with fever  Fever, leukocytosis  CT with opacities suspicion for infection  No atypical exposures, no sick contacts  RVP negative  UA+  Treat for bacterial pna, improving with antibiotic?  Legionella negative  Overall,  1) Bacterial pneumonia  - Zenkers cause for aspiration?  - Zosyn 3.375g q 8, continue while inpatient  - DC Azithro  - F/U pending sputum culture  - O2 supplementation per primary team  - Further workup if not improving  2) Leukocytosis  - Rising, due to steroid?  - F/U pending cultures  - Abx as above  3) Fever  - F/U pending BCX    From ID perspective would complete treatment with IV abx until O2 requirements decreasing, if patient opting to leave AMA, can send out on PO Levaquin 500mg q 24 to complete total 7 day course    My colleagues will be covering this patient on 9/29/21, I will return 9/30/21.  Please call [phone] or on call fellow with any questions or change in status.     Gee Mullen MD  Pager [phone]  From 5pm-9am, and on weekends call [phone]

## 2021-09-28 NOTE — PROGRESS NOTE ADULT - PROBLEM SELECTOR PLAN 6
BPH w/retention   per pt, since TURP 10 yrs ago c/b ?nerve injury, pt has been doing intermittent self-catheterizations ever since, typically max 2x a day. Denies any urinary sxs  - UA showing 187 WBCs, 1 WBC, large LE, neg nitrite   - bladder scans q12h  - c/w broad-spectrum abx   - f/u urine cxs  - c/w finasteride urine culture testing  will continue antibiotics  doubt active UTI

## 2021-09-28 NOTE — PROGRESS NOTE ADULT - SUBJECTIVE AND OBJECTIVE BOX
Patient is a 76y old  Male who presents with a chief complaint of PNA (28 Sep 2021 11:22)      DATE OF SERVICE: 09-28-21 @ 11:52    SUBJECTIVE / OVERNIGHT EVENTS: overnight events noted  "I want to go home"    ROS:  Resp: + cough no sputum production  CVS: No chest pain no palpitations no orthopnea  GI: no N/V/D  : no dysuria, no hematuria  Neuro: no weakness no paresthesias          MEDICATIONS  (STANDING):  albuterol/ipratropium for Nebulization 3 milliLiter(s) Nebulizer every 6 hours  aspirin enteric coated 81 milliGRAM(s) Oral daily  azithromycin  IVPB      azithromycin  IVPB 500 milliGRAM(s) IV Intermittent every 24 hours  budesonide 160 MICROgram(s)/formoterol 4.5 MICROgram(s) Inhaler 2 Puff(s) Inhalation two times a day  budesonide 160 MICROgram(s)/formoterol 4.5 MICROgram(s) Inhaler 2 Puff(s) Inhalation two times a day  enoxaparin Injectable 40 milliGRAM(s) SubCutaneous every 24 hours  finasteride 5 milliGRAM(s) Oral daily  losartan 25 milliGRAM(s) Oral daily  methylPREDNISolone sodium succinate Injectable 20 milliGRAM(s) IV Push every 8 hours  montelukast 10 milliGRAM(s) Oral daily  nebivolol 10 milliGRAM(s) Oral daily  piperacillin/tazobactam IVPB.. 3.375 Gram(s) IV Intermittent every 8 hours    MEDICATIONS  (PRN):  acetaminophen   Tablet .. 650 milliGRAM(s) Oral every 6 hours PRN Temp greater or equal to 38.5C (101.3F), Mild Pain (1 - 3)  ALBUTerol    90 MICROgram(s) HFA Inhaler 2 Puff(s) Inhalation four times a day PRN Shortness of Breath and/or Wheezing  aluminum hydroxide/magnesium hydroxide/simethicone Suspension 30 milliLiter(s) Oral every 4 hours PRN Dyspepsia  guaiFENesin Oral Liquid (Sugar-Free) 100 milliGRAM(s) Oral every 6 hours PRN Cough  melatonin 3 milliGRAM(s) Oral at bedtime PRN Insomnia  ondansetron Injectable 4 milliGRAM(s) IV Push every 8 hours PRN Nausea and/or Vomiting  zolpidem 5 milliGRAM(s) Oral at bedtime PRN Insomnia  zolpidem 5 milliGRAM(s) Oral at bedtime PRN Insomnia        CAPILLARY BLOOD GLUCOSE        I&O's Summary    27 Sep 2021 07:01  -  28 Sep 2021 07:00  --------------------------------------------------------  IN: 0 mL / OUT: 100 mL / NET: -100 mL        Vital Signs Last 24 Hrs  T(C): 37.1 (28 Sep 2021 06:14), Max: 37.2 (27 Sep 2021 21:03)  T(F): 98.7 (28 Sep 2021 06:14), Max: 98.9 (27 Sep 2021 21:03)  HR: 89 (28 Sep 2021 06:14) (74 - 94)  BP: 151/86 (28 Sep 2021 06:14) (151/86 - 163/65)  BP(mean): --  RR: 18 (28 Sep 2021 06:14) (18 - 24)  SpO2: 96% (28 Sep 2021 06:14) (94% - 98%)    PHYSICAL EXAM:  GENERAL: in no apparent distress  HEAD:  Atraumatic, Normocephalic  EYES: EOMI, PERRLA, sclera clear  NECK: Supple, No JVD  CHEST/LUNG: improved air entry  HEART: S1 S2; soft ejection systolic murmur +   ABDOMEN: Soft, Nontender, Bowel sounds present  EXTREMITIES:  No clubbing or cyanosis,  no edema  NEUROLOGY: AO x 3 non-focal  SKIN: No rashes or lesions    LABS:                        12.8   19.08 )-----------( 184      ( 28 Sep 2021 07:34 )             37.8     09-28    139  |  101  |  15  ----------------------------<  157<H>  3.7   |  23  |  0.86    Ca    10.1      28 Sep 2021 07:34  Phos  2.4     09-28  Mg     1.90     09-28    TPro  7.4  /  Alb  4.5  /  TBili  1.4<H>  /  DBili  x   /  AST  27  /  ALT  26  /  AlkPhos  57  09-27                All consultant(s) notes reviewed and care discussed with other providers        Contact Number, Dr Heard [phone]

## 2021-09-28 NOTE — PROGRESS NOTE ADULT - ASSESSMENT
76 year old man with A-fib (not on AC), aspirin use, hypertension, asthma on multiple inhalers, chronic back pain (herniated discs, spinal stenosis), h/o TURP with intermittent urinary catheterization presents for progressive cough, dyspnea and fever 103 F, found to have multilobar pneumonia likely from esophageal diverticulum on CTA chest    Impression:    # Hypoxic respiratory failure due to multilobular pneumonia - Suspect aspiration in setting of Zenker's diverticulum  # Esophageal diverticulum -  Incidental finding at esophageal inlet on CT chest; associated with regurgitation, chronic cough and likely microaspiration, no halitosis or dysphagia.   # Sepsis due to multilobar aspiration pneumonia - On IV antibiotics and supplemental oxygen  # A fib not on anticoagulation  # Asthma    Plan:  - patient electing for outpatient follow up with [facility] to discuss possible endoscopic repair of Zenker's diverticulum   - can obtain X-ray esophagram for further imaging of Zenker's diverticulum when PNA resolved  - IV antibiotics and supplemental O2 per primary team  - dietary recommendations as per SLP evaluation  - HOB elevated and other aspiration precautions      Allyn Huston PGY-6  Gastroenterology/Hepatology Fellow  Page #[phone]   Page #[phone] 5pm-7am on weekdays, and on weekends   76 year old man with A-fib (not on AC), aspirin use, hypertension, asthma on multiple inhalers, chronic back pain (herniated discs, spinal stenosis), h/o TURP with intermittent urinary catheterization presents for progressive cough, dyspnea and fever 103 F, found to have multilobar pneumonia likely from esophageal diverticulum on CTA chest    Impression:    # Hypoxic respiratory failure due to multilobular pneumonia - Suspect aspiration in setting of Zenker's diverticulum  # Esophageal diverticulum -  Incidental finding at esophageal inlet on CT chest; associated with regurgitation, chronic cough and likely microaspiration, no halitosis or dysphagia.   # Sepsis due to multilobar aspiration pneumonia - On IV antibiotics and supplemental oxygen  # A fib not on anticoagulation  # Asthma    Plan:  - patient electing for outpatient follow up with [facility] to discuss possible endoscopic repair of Zenker's diverticulum by Dr. Gregor Kaplan  - can obtain X-ray esophagram for further imaging of Zenker's diverticulum when PNA resolved  - dietary recommendations as per SLP evaluation  - Please notify [facility] team when X-ray esophagram has been completed if patient remains hospitalized.       Allyn Huston PGY-6  Gastroenterology/Hepatology Fellow  Page #[phone]   Page #[phone] 5pm-7am on weekdays, and on weekends

## 2021-09-28 NOTE — PROGRESS NOTE ADULT - SUBJECTIVE AND OBJECTIVE BOX
INTERVAL HPI/OVERNIGHT EVENTS:    feeling fatigued   no c/o abd pain or n/v    MEDICATIONS  (STANDING):  albuterol/ipratropium for Nebulization 3 milliLiter(s) Nebulizer every 6 hours  aspirin enteric coated 81 milliGRAM(s) Oral daily  azithromycin  IVPB      azithromycin  IVPB 500 milliGRAM(s) IV Intermittent every 24 hours  budesonide 160 MICROgram(s)/formoterol 4.5 MICROgram(s) Inhaler 2 Puff(s) Inhalation two times a day  budesonide 160 MICROgram(s)/formoterol 4.5 MICROgram(s) Inhaler 2 Puff(s) Inhalation two times a day  enoxaparin Injectable 40 milliGRAM(s) SubCutaneous every 24 hours  finasteride 5 milliGRAM(s) Oral daily  losartan 25 milliGRAM(s) Oral daily  methylPREDNISolone sodium succinate Injectable 20 milliGRAM(s) IV Push every 8 hours  montelukast 10 milliGRAM(s) Oral daily  nebivolol 10 milliGRAM(s) Oral daily  piperacillin/tazobactam IVPB.. 3.375 Gram(s) IV Intermittent every 8 hours    MEDICATIONS  (PRN):  acetaminophen   Tablet .. 650 milliGRAM(s) Oral every 6 hours PRN Temp greater or equal to 38.5C (101.3F), Mild Pain (1 - 3)  ALBUTerol    90 MICROgram(s) HFA Inhaler 2 Puff(s) Inhalation four times a day PRN Shortness of Breath and/or Wheezing  aluminum hydroxide/magnesium hydroxide/simethicone Suspension 30 milliLiter(s) Oral every 4 hours PRN Dyspepsia  guaiFENesin Oral Liquid (Sugar-Free) 100 milliGRAM(s) Oral every 6 hours PRN Cough  melatonin 3 milliGRAM(s) Oral at bedtime PRN Insomnia  ondansetron Injectable 4 milliGRAM(s) IV Push every 8 hours PRN Nausea and/or Vomiting  zolpidem 5 milliGRAM(s) Oral at bedtime PRN Insomnia  zolpidem 5 milliGRAM(s) Oral at bedtime PRN Insomnia      Allergies    No Known Allergies    Intolerances        Review of Systems:    General:  No wt loss, fevers, chills, night sweats, fatigue   Eyes:  Good vision, no reported pain  ENT:  No sore throat, pain, runny nose, dysphagia  CV:  No pain, palpitations, hypo/hypertension  Resp:  No dyspnea, cough, tachypnea, wheezing  GI:  No pain, No nausea, No vomiting, No diarrhea, No constipation, No weight loss, No fever, No pruritis, No rectal bleeding, No melena, No dysphagia  :  No pain, bleeding, incontinence, nocturia  Muscle:  No pain, weakness  Neuro:  No weakness, tingling, memory problems  Psych:  No fatigue, insomnia, mood problems, depression  Endocrine:  No polyuria, polydypsia, cold/heat intolerance  Heme:  No petechiae, ecchymosis, easy bruisability  Skin:  No rash, tattoos, scars, edema      Vital Signs Last 24 Hrs  T(C): 37.1 (28 Sep 2021 06:14), Max: 37.2 (27 Sep 2021 21:03)  T(F): 98.7 (28 Sep 2021 06:14), Max: 98.9 (27 Sep 2021 21:03)  HR: 89 (28 Sep 2021 06:14) (74 - 94)  BP: 151/86 (28 Sep 2021 06:14) (151/86 - 163/65)  BP(mean): --  RR: 18 (28 Sep 2021 06:14) (18 - 24)  SpO2: 96% (28 Sep 2021 06:14) (94% - 98%)    PHYSICAL EXAM:    Constitutional: NAD  HEENT: EOMI, throat clear  Neck: No LAD, supple  Respiratory: CTA and P  Cardiovascular: S1 and S2, RRR, no M  Gastrointestinal: BS+, soft, NT/ND, neg HSM,  Extremities: No peripheral edema, neg clubbing, cyanosis  Vascular: 2+ peripheral pulses  Neurological: A/O x 3, no focal deficits  Psychiatric: Normal mood, normal affect  Skin: No rashes      LABS:                        12.8   19.08 )-----------( 184      ( 28 Sep 2021 07:34 )             37.8     09-28    139  |  101  |  15  ----------------------------<  157<H>  3.7   |  23  |  0.86    Ca    10.1      28 Sep 2021 07:34  Phos  2.4     09-28  Mg     1.90     09-28    TPro  7.4  /  Alb  4.5  /  TBili  1.4<H>  /  DBili  x   /  AST  27  /  ALT  26  /  AlkPhos  57  09-27          RADIOLOGY & ADDITIONAL TESTS:

## 2021-09-28 NOTE — PROGRESS NOTE ADULT - SUBJECTIVE AND OBJECTIVE BOX
CC: F/U PNA    Saw/spoke to patient. No fevers, no chills. Asking to go home, wants to leave AMA.    Allergies  No Known Allergies    ANTIMICROBIALS:  azithromycin  IVPB    azithromycin  IVPB 500 every 24 hours  piperacillin/tazobactam IVPB.. 3.375 every 8 hours    PE:    Vital Signs Last 24 Hrs  T(C): 37.1 (28 Sep 2021 06:14), Max: 37.2 (27 Sep 2021 21:03)  T(F): 98.7 (28 Sep 2021 06:14), Max: 98.9 (27 Sep 2021 21:03)  HR: 89 (28 Sep 2021 06:14) (74 - 94)  BP: 151/86 (28 Sep 2021 06:14) (151/86 - 163/65)  RR: 18 (28 Sep 2021 06:14) (18 - 24)  SpO2: 96% (28 Sep 2021 06:14) (94% - 98%)    Gen: AOx3, NAD, non-toxic  CV: S1+S2 normal, nontachycardic  Resp: Clear bilat, no resp distress, no crackles/wheezes, NC 5L  Abd: Soft, nontender, +BS  Ext: No LE edema, no wounds    LABS:                        12.8   19.08 )-----------( 184      ( 28 Sep 2021 07:34 )             37.8     09-28    139  |  101  |  15  ----------------------------<  157<H>  3.7   |  23  |  0.86    Ca    10.1      28 Sep 2021 07:34  Phos  2.4     09-28  Mg     1.90     09-28    TPro  7.4  /  Alb  4.5  /  TBili  1.4<H>  /  DBili  x   /  AST  27  /  ALT  26  /  AlkPhos  57  09-27    MICROBIOLOGY:    .Sputum Sputum  09-27-21 --  --    No polymorphonuclear leukocytes per low power field  No Squamous epithelial cells per low power field  Few Gram positive cocci in pairs per oil power field  Few Gram Negative Rods per oil power field    .Blood Blood-Peripheral  09-26-21   No growth to date.    .Blood Blood-Peripheral  09-26-21   No growth to date.    Rapid RVP Result: NotDetec (09-26 @ 11:03)    (otherwise reviewed)    RADIOLOGY:    9/26 CT:    IMPRESSION:    1.  No pulmonary embolism.  2.  Bilateral patchy and groundglass opacities can occur in the setting of a variety of clinical settings including infection.  3.  The outpouching arising from the esophagus at thoracic inlet is suspicious for a Zenker's diverticulum.

## 2021-09-28 NOTE — PROGRESS NOTE ADULT - SUBJECTIVE AND OBJECTIVE BOX
Date of Service: 09-28-21 @ 20:05    Patient is a 76y old  Male who presents with a chief complaint of PNA (28 Sep 2021 11:52)      Any change in ROS:     MEDICATIONS  (STANDING):  albuterol/ipratropium for Nebulization 3 milliLiter(s) Nebulizer every 6 hours  aspirin enteric coated 81 milliGRAM(s) Oral daily  budesonide 160 MICROgram(s)/formoterol 4.5 MICROgram(s) Inhaler 2 Puff(s) Inhalation two times a day  budesonide 160 MICROgram(s)/formoterol 4.5 MICROgram(s) Inhaler 2 Puff(s) Inhalation two times a day  enoxaparin Injectable 40 milliGRAM(s) SubCutaneous every 24 hours  finasteride 5 milliGRAM(s) Oral daily  losartan 25 milliGRAM(s) Oral daily  methylPREDNISolone sodium succinate Injectable 20 milliGRAM(s) IV Push every 8 hours  montelukast 10 milliGRAM(s) Oral daily  nebivolol 10 milliGRAM(s) Oral daily  piperacillin/tazobactam IVPB.. 3.375 Gram(s) IV Intermittent every 8 hours  zolpidem 10 milliGRAM(s) Oral at bedtime    MEDICATIONS  (PRN):  acetaminophen   Tablet .. 650 milliGRAM(s) Oral every 6 hours PRN Temp greater or equal to 38.5C (101.3F), Mild Pain (1 - 3)  ALBUTerol    90 MICROgram(s) HFA Inhaler 2 Puff(s) Inhalation four times a day PRN Shortness of Breath and/or Wheezing  aluminum hydroxide/magnesium hydroxide/simethicone Suspension 30 milliLiter(s) Oral every 4 hours PRN Dyspepsia  guaiFENesin Oral Liquid (Sugar-Free) 100 milliGRAM(s) Oral every 6 hours PRN Cough  melatonin 3 milliGRAM(s) Oral at bedtime PRN Insomnia  ondansetron Injectable 4 milliGRAM(s) IV Push every 8 hours PRN Nausea and/or Vomiting    Vital Signs Last 24 Hrs  T(C): 36.3 (28 Sep 2021 13:41), Max: 37.2 (27 Sep 2021 21:03)  T(F): 97.3 (28 Sep 2021 13:41), Max: 98.9 (27 Sep 2021 21:03)  HR: 77 (28 Sep 2021 15:14) (77 - 94)  BP: 130/75 (28 Sep 2021 13:41) (130/75 - 163/65)  BP(mean): --  RR: 17 (28 Sep 2021 13:41) (17 - 24)  SpO2: 98% (28 Sep 2021 13:41) (96% - 98%)    I&O's Summary    27 Sep 2021 07:01  -  28 Sep 2021 07:00  --------------------------------------------------------  IN: 0 mL / OUT: 100 mL / NET: -100 mL          Physical Exam:   GENERAL: NAD, well-groomed, well-developed  HEENT: AZUCENA/   Atraumatic, Normocephalic  ENMT: No tonsillar erythema, exudates, or enlargement; Moist mucous membranes, Good dentition, No lesions  NECK: Supple, No JVD, Normal thyroid  CHEST/LUNG: Clear to auscultaion, ; No rales, rhonchi, wheezing, or rubs  CVS: Regular rate and rhythm; No murmurs, rubs, or gallops  GI: : Soft, Nontender, Nondistended; Bowel sounds present  NERVOUS SYSTEM:  Alert & Oriented X3, Good concentration; Motor Strength 5/5 B/L upper and lower extremities; DTRs 2+ intact and symmetric  EXTREMITIES:  2+ Peripheral Pulses, No clubbing, cyanosis, or edema  LYMPH: No lymphadenopathy noted  SKIN: No rashes or lesions  ENDOCRINOLOGY: No Thyromegaly  PSYCH: Appropriate    Labs:  26, 22, 24                            12.8   19.08 )-----------( 184      ( 28 Sep 2021 07:34 )             37.8                         12.6   16.24 )-----------( 156      ( 27 Sep 2021 07:58 )             37.3                         14.8   13.35 )-----------( 172      ( 26 Sep 2021 10:52 )             43.0     09-28    139  |  101  |  15  ----------------------------<  157<H>  3.7   |  23  |  0.86  09-27    136  |  100  |  15  ----------------------------<  115<H>  3.3<L>   |  21<L>  |  0.93  09-26    136  |  100  |  19  ----------------------------<  155<H>  3.8   |  20<L>  |  1.03    Ca    10.1      28 Sep 2021 07:34  Ca    9.1      27 Sep 2021 07:58  Phos  2.4     09-28  Phos  2.1     09-27  Mg     1.90     09-28  Mg     1.50     09-27    TPro  7.4  /  Alb  4.5  /  TBili  1.4<H>  /  DBili  x   /  AST  27  /  ALT  26  /  AlkPhos  57  09-27  TPro  7.9  /  Alb  4.6  /  TBili  1.3<H>  /  DBili  x   /  AST  29  /  ALT  30  /  AlkPhos  66  09-26    CAPILLARY BLOOD GLUCOSE          LIVER FUNCTIONS - ( 27 Sep 2021 07:58 )  Alb: 4.5 g/dL / Pro: 7.4 g/dL / ALK PHOS: 57 U/L / ALT: 26 U/L / AST: 27 U/L / GGT: x                     RECENT CULTURES:  09-27 @ 20:31 .Sputum Sputum       No polymorphonuclear leukocytes per low power field  No Squamous epithelial cells per low power field  Few Gram positive cocci in pairs per oil power field  Few Gram Negative Rods per oil power field           Normal Respiratory Stephanie present    09-27 @ 10:51 .Throat Throat                No beta hemolytic streptococci or Arcanobacterium haemolyticum isolated.    09-26 @ 19:05 Clean Catch Clean Catch (Midstream)                >100,000 CFU/ml Gram Negative Rods    09-26 @ 18:39 .Blood Blood-Peripheral                No growth to date.    09-26 @ 18:36 .Blood Blood-Peripheral                No growth to date.          RESPIRATORY CULTURES:          Studies  Chest X-RAY  CT SCAN Chest   Venous Dopplers: LE:   CT Abdomen  Others

## 2021-09-28 NOTE — PROGRESS NOTE ADULT - PROBLEM SELECTOR PLAN 8
he has asthma and wheezing with poor air entry : cont steroids IV for one more day as well as BD and Symbicort: pt has been strongly advised to stop smoking and drinking!

## 2021-09-28 NOTE — PROGRESS NOTE ADULT - ASSESSMENT
76 year old male with fever, cough and dysphagia     Dysphagia   Likely 2/2 to his Zenker's Diverticulum  [facility] to follow as outpt for endoscopic eval per pt wishes   Please obtain X-ray Esophagram once his PNA has improved   aspiration precautions w/PO intake    Pneumonia/ SOB   Abx and management as per primary team       I reviewed the overnight course of events on the unit, re-confirming the patient history. I discussed the care with the patient and their family. The plan of care was discussed with the physician assistant and modifications were made to the notation where appropriate. Differential diagnosis and plan of care discussed with patient after the evaluation. Advanced care planning was discussed with patient and family.  Advanced care planning forms were reviewed and discussed.  Risks, benefits and alternatives of gastroenterologic procedures were discussed in detail and all questions were answered. 35 minutes spent on total encounter of which more than fifty percent of the encounter was spent counseling and/or coordinating care by the attending physician.

## 2021-09-28 NOTE — PROGRESS NOTE ADULT - ASSESSMENT
77yo M w/ PMH HTN, A-fib, asthma, BPH, and chronic back pain (herniated discs, spinal stenosis) presents with cough x 1 now, worsening over the past few days now with fever (Tmax 103 at home), SOB, sputum production, and sore throat. In ED, was febrile to 104 F and tachy to 140s with leukocytosis of 13 and elevated lactate to 2.5 Now s/p 2L NS bolus, lactate downtrended to 1.5. CXR showed b/l lower lung opacities c/f PNA. CTA chest showed b/l patchy, ground glass opacities that may represent infection, as well as, an outpouching of the esophagus near thoracic inlet suspicious for Zenker's diverticulum. No PE. Of note pt, also does self-catheterizations at home, without any urinary complaints. UA showing 187 WBCs, large LE, neg nitrite. Admitted to medicine for likely PNA, pending other infectious workup.

## 2021-09-28 NOTE — PROGRESS NOTE ADULT - PROBLEM SELECTOR PLAN 7
Essential hypertension  normally takes 1/2 tablet of losartan-HCTZ 50-12.5 but per pharmacy, do not carry HCTZ 6.25 (lowest dose 12.5 capsules)  - given HCTZ dose low, will hold for now  - c/w losartan 25mg qd  - c/w home bystolic 10mg qd acceptable  mild elevation secondary to solumedrol IV

## 2021-09-28 NOTE — PROGRESS NOTE ADULT - PROBLEM SELECTOR PLAN 9
Diet: NPO pending SLP eval, aspiration precautions given possible aspiration PNA/zenker's   DVT ppx: lovenox 40mg sq  Dispo: home pending hospital course continue dysphagia 3 diet

## 2021-09-28 NOTE — PROGRESS NOTE ADULT - PROBLEM SELECTOR PLAN 5
given report of dysphagia, would consult surgery when clinically improving for eval for surgical correction [facility] help appreciated  will continue to follow recommendations

## 2021-09-29 LAB
-  AMIKACIN: SIGNIFICANT CHANGE UP
-  AMOXICILLIN/CLAVULANIC ACID: SIGNIFICANT CHANGE UP
-  AMPICILLIN/SULBACTAM: SIGNIFICANT CHANGE UP
-  AMPICILLIN: SIGNIFICANT CHANGE UP
-  AZTREONAM: SIGNIFICANT CHANGE UP
-  CEFAZOLIN: SIGNIFICANT CHANGE UP
-  CEFEPIME: SIGNIFICANT CHANGE UP
-  CEFOXITIN: SIGNIFICANT CHANGE UP
-  CEFTRIAXONE: SIGNIFICANT CHANGE UP
-  CIPROFLOXACIN: SIGNIFICANT CHANGE UP
-  ERTAPENEM: SIGNIFICANT CHANGE UP
-  GENTAMICIN: SIGNIFICANT CHANGE UP
-  IMIPENEM: SIGNIFICANT CHANGE UP
-  LEVOFLOXACIN: SIGNIFICANT CHANGE UP
-  MEROPENEM: SIGNIFICANT CHANGE UP
-  NITROFURANTOIN: SIGNIFICANT CHANGE UP
-  PIPERACILLIN/TAZOBACTAM: SIGNIFICANT CHANGE UP
-  TIGECYCLINE: SIGNIFICANT CHANGE UP
-  TOBRAMYCIN: SIGNIFICANT CHANGE UP
-  TRIMETHOPRIM/SULFAMETHOXAZOLE: SIGNIFICANT CHANGE UP
ANION GAP SERPL CALC-SCNC: 26 MMOL/L — HIGH (ref 7–14)
APTT BLD: 26.4 SEC — LOW (ref 27–36.3)
APTT BLD: 48.2 SEC — HIGH (ref 27–36.3)
BUN SERPL-MCNC: 22 MG/DL — SIGNIFICANT CHANGE UP (ref 7–23)
CALCIUM SERPL-MCNC: 9.9 MG/DL — SIGNIFICANT CHANGE UP (ref 8.4–10.5)
CHLORIDE SERPL-SCNC: 99 MMOL/L — SIGNIFICANT CHANGE UP (ref 98–107)
CO2 SERPL-SCNC: 13 MMOL/L — LOW (ref 22–31)
CREAT SERPL-MCNC: 0.9 MG/DL — SIGNIFICANT CHANGE UP (ref 0.5–1.3)
CULTURE RESULTS: SIGNIFICANT CHANGE UP
GLUCOSE SERPL-MCNC: 154 MG/DL — HIGH (ref 70–99)
HCT VFR BLD CALC: 38.1 % — LOW (ref 39–50)
HCT VFR BLD CALC: 38.2 % — LOW (ref 39–50)
HGB BLD-MCNC: 12.8 G/DL — LOW (ref 13–17)
HGB BLD-MCNC: 12.9 G/DL — LOW (ref 13–17)
MAGNESIUM SERPL-MCNC: 2.3 MG/DL — SIGNIFICANT CHANGE UP (ref 1.6–2.6)
MCHC RBC-ENTMCNC: 31.3 PG — SIGNIFICANT CHANGE UP (ref 27–34)
MCHC RBC-ENTMCNC: 31.4 PG — SIGNIFICANT CHANGE UP (ref 27–34)
MCHC RBC-ENTMCNC: 33.5 GM/DL — SIGNIFICANT CHANGE UP (ref 32–36)
MCHC RBC-ENTMCNC: 33.9 GM/DL — SIGNIFICANT CHANGE UP (ref 32–36)
MCV RBC AUTO: 92.7 FL — SIGNIFICANT CHANGE UP (ref 80–100)
MCV RBC AUTO: 93.4 FL — SIGNIFICANT CHANGE UP (ref 80–100)
METHOD TYPE: SIGNIFICANT CHANGE UP
NRBC # BLD: 0 /100 WBCS — SIGNIFICANT CHANGE UP
NRBC # BLD: 0 /100 WBCS — SIGNIFICANT CHANGE UP
NRBC # FLD: 0 K/UL — SIGNIFICANT CHANGE UP
NRBC # FLD: 0 K/UL — SIGNIFICANT CHANGE UP
NT-PROBNP SERPL-SCNC: 1509 PG/ML — HIGH
ORGANISM # SPEC MICROSCOPIC CNT: SIGNIFICANT CHANGE UP
ORGANISM # SPEC MICROSCOPIC CNT: SIGNIFICANT CHANGE UP
PHOSPHATE SERPL-MCNC: 3.5 MG/DL — SIGNIFICANT CHANGE UP (ref 2.5–4.5)
PLATELET # BLD AUTO: 207 K/UL — SIGNIFICANT CHANGE UP (ref 150–400)
PLATELET # BLD AUTO: 224 K/UL — SIGNIFICANT CHANGE UP (ref 150–400)
POTASSIUM SERPL-MCNC: 5.6 MMOL/L — HIGH (ref 3.5–5.3)
POTASSIUM SERPL-SCNC: 5.6 MMOL/L — HIGH (ref 3.5–5.3)
RBC # BLD: 4.09 M/UL — LOW (ref 4.2–5.8)
RBC # BLD: 4.11 M/UL — LOW (ref 4.2–5.8)
RBC # FLD: 13 % — SIGNIFICANT CHANGE UP (ref 10.3–14.5)
RBC # FLD: 13.1 % — SIGNIFICANT CHANGE UP (ref 10.3–14.5)
SODIUM SERPL-SCNC: 138 MMOL/L — SIGNIFICANT CHANGE UP (ref 135–145)
SPECIMEN SOURCE: SIGNIFICANT CHANGE UP
WBC # BLD: 17.75 K/UL — HIGH (ref 3.8–10.5)
WBC # BLD: 18.45 K/UL — HIGH (ref 3.8–10.5)
WBC # FLD AUTO: 17.75 K/UL — HIGH (ref 3.8–10.5)
WBC # FLD AUTO: 18.45 K/UL — HIGH (ref 3.8–10.5)

## 2021-09-29 PROCEDURE — 71045 X-RAY EXAM CHEST 1 VIEW: CPT | Mod: 26

## 2021-09-29 PROCEDURE — 93306 TTE W/DOPPLER COMPLETE: CPT | Mod: 26

## 2021-09-29 PROCEDURE — 99232 SBSQ HOSP IP/OBS MODERATE 35: CPT | Mod: GC

## 2021-09-29 RX ORDER — HEPARIN SODIUM 5000 [USP'U]/ML
3000 INJECTION INTRAVENOUS; SUBCUTANEOUS EVERY 6 HOURS
Refills: 0 | Status: DISCONTINUED | OUTPATIENT
Start: 2021-09-29 | End: 2021-09-30

## 2021-09-29 RX ORDER — HEPARIN SODIUM 5000 [USP'U]/ML
INJECTION INTRAVENOUS; SUBCUTANEOUS
Qty: 25000 | Refills: 0 | Status: DISCONTINUED | OUTPATIENT
Start: 2021-09-29 | End: 2021-09-30

## 2021-09-29 RX ORDER — DILTIAZEM HCL 120 MG
60 CAPSULE, EXT RELEASE 24 HR ORAL EVERY 6 HOURS
Refills: 0 | Status: DISCONTINUED | OUTPATIENT
Start: 2021-09-29 | End: 2021-10-01

## 2021-09-29 RX ORDER — IPRATROPIUM BROMIDE 0.2 MG/ML
500 SOLUTION, NON-ORAL INHALATION EVERY 6 HOURS
Refills: 0 | Status: DISCONTINUED | OUTPATIENT
Start: 2021-09-29 | End: 2021-10-01

## 2021-09-29 RX ORDER — HEPARIN SODIUM 5000 [USP'U]/ML
6500 INJECTION INTRAVENOUS; SUBCUTANEOUS EVERY 6 HOURS
Refills: 0 | Status: DISCONTINUED | OUTPATIENT
Start: 2021-09-29 | End: 2021-09-30

## 2021-09-29 RX ORDER — DILTIAZEM HCL 120 MG
5 CAPSULE, EXT RELEASE 24 HR ORAL ONCE
Refills: 0 | Status: COMPLETED | OUTPATIENT
Start: 2021-09-29 | End: 2021-09-29

## 2021-09-29 RX ORDER — METOPROLOL TARTRATE 50 MG
5 TABLET ORAL ONCE
Refills: 0 | Status: COMPLETED | OUTPATIENT
Start: 2021-09-29 | End: 2021-09-29

## 2021-09-29 RX ORDER — DILTIAZEM HCL 120 MG
30 CAPSULE, EXT RELEASE 24 HR ORAL EVERY 6 HOURS
Refills: 0 | Status: DISCONTINUED | OUTPATIENT
Start: 2021-09-29 | End: 2021-09-29

## 2021-09-29 RX ORDER — LEVALBUTEROL 1.25 MG/.5ML
0.63 SOLUTION, CONCENTRATE RESPIRATORY (INHALATION) EVERY 6 HOURS
Refills: 0 | Status: DISCONTINUED | OUTPATIENT
Start: 2021-09-29 | End: 2021-10-01

## 2021-09-29 RX ADMIN — BUDESONIDE AND FORMOTEROL FUMARATE DIHYDRATE 2 PUFF(S): 160; 4.5 AEROSOL RESPIRATORY (INHALATION) at 22:37

## 2021-09-29 RX ADMIN — Medication 500 MICROGRAM(S): at 15:48

## 2021-09-29 RX ADMIN — HEPARIN SODIUM 1600 UNIT(S)/HR: 5000 INJECTION INTRAVENOUS; SUBCUTANEOUS at 17:52

## 2021-09-29 RX ADMIN — Medication 500 MICROGRAM(S): at 23:38

## 2021-09-29 RX ADMIN — HEPARIN SODIUM 3000 UNIT(S): 5000 INJECTION INTRAVENOUS; SUBCUTANEOUS at 18:48

## 2021-09-29 RX ADMIN — Medication 60 MILLIGRAM(S): at 23:36

## 2021-09-29 RX ADMIN — Medication 81 MILLIGRAM(S): at 12:00

## 2021-09-29 RX ADMIN — Medication 5 MILLIGRAM(S): at 09:24

## 2021-09-29 RX ADMIN — Medication 5 MILLIGRAM(S): at 06:18

## 2021-09-29 RX ADMIN — PIPERACILLIN AND TAZOBACTAM 25 GRAM(S): 4; .5 INJECTION, POWDER, LYOPHILIZED, FOR SOLUTION INTRAVENOUS at 11:16

## 2021-09-29 RX ADMIN — ZOLPIDEM TARTRATE 10 MILLIGRAM(S): 10 TABLET ORAL at 23:36

## 2021-09-29 RX ADMIN — LEVALBUTEROL 0.63 MILLIGRAM(S): 1.25 SOLUTION, CONCENTRATE RESPIRATORY (INHALATION) at 23:37

## 2021-09-29 RX ADMIN — PIPERACILLIN AND TAZOBACTAM 25 GRAM(S): 4; .5 INJECTION, POWDER, LYOPHILIZED, FOR SOLUTION INTRAVENOUS at 18:37

## 2021-09-29 RX ADMIN — BUDESONIDE AND FORMOTEROL FUMARATE DIHYDRATE 2 PUFF(S): 160; 4.5 AEROSOL RESPIRATORY (INHALATION) at 09:28

## 2021-09-29 RX ADMIN — NEBIVOLOL HYDROCHLORIDE 10 MILLIGRAM(S): 5 TABLET ORAL at 06:33

## 2021-09-29 RX ADMIN — Medication 5 MILLIGRAM(S): at 11:23

## 2021-09-29 RX ADMIN — Medication 30 MILLIGRAM(S): at 12:51

## 2021-09-29 RX ADMIN — Medication 20 MILLIGRAM(S): at 18:37

## 2021-09-29 RX ADMIN — Medication 20 MILLIGRAM(S): at 09:27

## 2021-09-29 RX ADMIN — Medication 60 MILLIGRAM(S): at 18:37

## 2021-09-29 RX ADMIN — LEVALBUTEROL 0.63 MILLIGRAM(S): 1.25 SOLUTION, CONCENTRATE RESPIRATORY (INHALATION) at 11:32

## 2021-09-29 RX ADMIN — FINASTERIDE 5 MILLIGRAM(S): 5 TABLET, FILM COATED ORAL at 11:15

## 2021-09-29 RX ADMIN — MONTELUKAST 10 MILLIGRAM(S): 4 TABLET, CHEWABLE ORAL at 11:15

## 2021-09-29 RX ADMIN — Medication 100 MILLIGRAM(S): at 18:45

## 2021-09-29 RX ADMIN — Medication 500 MICROGRAM(S): at 10:56

## 2021-09-29 RX ADMIN — HEPARIN SODIUM 1400 UNIT(S)/HR: 5000 INJECTION INTRAVENOUS; SUBCUTANEOUS at 09:43

## 2021-09-29 RX ADMIN — PIPERACILLIN AND TAZOBACTAM 25 GRAM(S): 4; .5 INJECTION, POWDER, LYOPHILIZED, FOR SOLUTION INTRAVENOUS at 06:32

## 2021-09-29 NOTE — PROGRESS NOTE ADULT - SUBJECTIVE AND OBJECTIVE BOX
Patient is a 76y old  Male who presents with a chief complaint of PNA (28 Sep 2021 20:05)      DATE OF SERVICE: 09-29-21 @ 12:29    SUBJECTIVE / OVERNIGHT EVENTS: overnight events noted    ROS:  Resp: + cough no sputum production  still short of breath   CVS: No chest pain no palpitations no orthopnea  GI: no N/V/D  : no dysuria, no hematuria  Neuro: no weakness no paresthesias          MEDICATIONS  (STANDING):  aspirin enteric coated 81 milliGRAM(s) Oral daily  budesonide 160 MICROgram(s)/formoterol 4.5 MICROgram(s) Inhaler 2 Puff(s) Inhalation two times a day  budesonide 160 MICROgram(s)/formoterol 4.5 MICROgram(s) Inhaler 2 Puff(s) Inhalation two times a day  diltiazem    Tablet 30 milliGRAM(s) Oral every 6 hours  finasteride 5 milliGRAM(s) Oral daily  heparin  Infusion.  Unit(s)/Hr (14 mL/Hr) IV Continuous <Continuous>  ipratropium    for Nebulization 500 MICROGram(s) Nebulizer every 6 hours  levalbuterol Inhalation 0.63 milliGRAM(s) Inhalation every 6 hours  montelukast 10 milliGRAM(s) Oral daily  nebivolol 10 milliGRAM(s) Oral daily  piperacillin/tazobactam IVPB.. 3.375 Gram(s) IV Intermittent every 8 hours  zolpidem 10 milliGRAM(s) Oral at bedtime    MEDICATIONS  (PRN):  acetaminophen   Tablet .. 650 milliGRAM(s) Oral every 6 hours PRN Temp greater or equal to 38.5C (101.3F), Mild Pain (1 - 3)  aluminum hydroxide/magnesium hydroxide/simethicone Suspension 30 milliLiter(s) Oral every 4 hours PRN Dyspepsia  guaiFENesin Oral Liquid (Sugar-Free) 100 milliGRAM(s) Oral every 6 hours PRN Cough  heparin   Injectable 6500 Unit(s) IV Push every 6 hours PRN For aPTT less than 40  heparin   Injectable 3000 Unit(s) IV Push every 6 hours PRN For aPTT between 40 - 57  melatonin 3 milliGRAM(s) Oral at bedtime PRN Insomnia  ondansetron Injectable 4 milliGRAM(s) IV Push every 8 hours PRN Nausea and/or Vomiting        CAPILLARY BLOOD GLUCOSE        I&O's Summary      Vital Signs Last 24 Hrs  T(C): 37.1 (29 Sep 2021 06:08), Max: 37.2 (28 Sep 2021 20:00)  T(F): 98.7 (29 Sep 2021 06:08), Max: 98.9 (28 Sep 2021 20:00)  HR: 119 (29 Sep 2021 10:56) (72 - 163)  BP: 119/91 (29 Sep 2021 10:38) (112/79 - 157/80)  BP(mean): --  RR: 20 (29 Sep 2021 10:38) (17 - 24)  SpO2: 97% (29 Sep 2021 10:56) (96% - 100%)    PHYSICAL EXAM:  GENERAL: in no apparent distress  HEAD:  Atraumatic, Normocephalic  EYES: EOMI, PERRLA, sclera clear  NECK: Supple, No JVD  CHEST/LUNG: improved air entry  HEART: S1 S2; soft ejection systolic murmur +   irregularly irregular tachy  ABDOMEN: Soft, Nontender, Bowel sounds present  EXTREMITIES:  no edema  NEUROLOGY: AO x 3 non-focal  SKIN: No rashes or lesions    LABS:                        12.8   18.45 )-----------( 224      ( 29 Sep 2021 07:31 )             38.2     09-29    138  |  99  |  22  ----------------------------<  154<H>  5.6<H>   |  13<L>  |  0.90    Ca    9.9      29 Sep 2021 07:31  Phos  3.5     09-29  Mg     2.30     09-29      PTT - ( 29 Sep 2021 09:47 )  PTT:26.4 sec            All consultant(s) notes reviewed and care discussed with other providers        Contact Number, Dr Heard [phone]

## 2021-09-29 NOTE — CONSULT NOTE ADULT - SUBJECTIVE AND OBJECTIVE BOX
EP Attending    HISTORY OF PRESENT ILLNESS: HPI:  77yo M w/ PMH HTN, A-fib (on ASA only), asthma, BPH, and chronic back pain (herniated discs, spinal stenosis) presents with worsening cough a/w fever and SOB. Pt states that he has been coughing for over a month now with a sore throat, worsening over the past 3-4 days with increasing sputum production and difficulty breathing. Spiked a 103 F fever today. Patient has a history of asthma and is supposed to use his symbicort 2x a day that he's not very compliant with. But since he's been coughing, he's been using it twice a day. Patient reports chest tightness and HA during coughing fits. No n/v/d, abdominal pain, CP, or recent travel. Has been hospitalized twice in the past for pneumonia (5 years ago, in setting of post-op). Never been hospitalized for asthma or required intubation.   He also has a sore throat that started after he started coughing. Denies difficulty swallowing but has noticed intermittent coughing while eating over the past 6 months. Denies foul breath/odor.   Of note, patient has a h/o BPH and had a TURP 10 yrs ago c/b ?nerve injury- so he has been doing intermittent self-catheterizations for the past 10 yrs, max 2x a day, less if he's home and can conveniently use the bathroom. Have not noticed any worsening urinary frequency or other urinary sxs. Denies dysuria, urinary urgency, hematuria.     In the ED, given 2L IV NS bolus, zosyn/vanc x 1, and Tylenol.    (26 Sep 2021 23:29)    Seen in [facility] on 9/29.  No shortness of breath, palpitations or angina with HR ~150bpm.    No history of lightheadedness or fainting.  Has had multiple prior aspiration pneumonia events.  Is not aware of diagnosis of AFib.  His wife states that he IS or SHOULD BE aware, as this has come up during prior surgical visits for his knee replacements, and that he had to go into the cardiac icu for stabilization before elective surgery.  He does not take an anticoagulant.  Has HTN, is 77yo, no hx MI, stroke, blood clot or CHF.  He sees Dr Gee Burgess in [place] for general cardiolgoy.    PAST MEDICAL & SURGICAL HISTORY:  HTN (hypertension)    OA (osteoarthritis)    Spinal stenosis    Asthma    BPH (benign prostatic hyperplasia)    S/p total knee replacement, bilateral      MEDICATIONS  (STANDING):  aspirin enteric coated 81 milliGRAM(s) Oral daily  budesonide 160 MICROgram(s)/formoterol 4.5 MICROgram(s) Inhaler 2 Puff(s) Inhalation two times a day  budesonide 160 MICROgram(s)/formoterol 4.5 MICROgram(s) Inhaler 2 Puff(s) Inhalation two times a day  diltiazem    Tablet 30 milliGRAM(s) Oral every 6 hours  finasteride 5 milliGRAM(s) Oral daily  heparin  Infusion.  Unit(s)/Hr (14 mL/Hr) IV Continuous <Continuous>  ipratropium    for Nebulization 500 MICROGram(s) Nebulizer every 6 hours  levalbuterol Inhalation 0.63 milliGRAM(s) Inhalation every 6 hours  montelukast 10 milliGRAM(s) Oral daily  nebivolol 10 milliGRAM(s) Oral daily  piperacillin/tazobactam IVPB.. 3.375 Gram(s) IV Intermittent every 8 hours  predniSONE   Tablet 20 milliGRAM(s) Oral every 12 hours  zolpidem 10 milliGRAM(s) Oral at bedtime      Allergies    No Known Allergies    Intolerances      FAMILY HISTORY:  FH: lung cancer (Mother)    FH: type 2 diabetes (Father)    Family history of CKD (chronic kidney disease) (Father)    Non-contributary for premature coronary disease or sudden cardiac death    SOCIAL HISTORY:    [ ] Non-smoker  [x ] Smoker  [ ] Alcohol    PHYSICAL EXAM:  T(C): 37.1 (09-29-21 @ 06:08), Max: 37.2 (09-28-21 @ 20:00)  HR: 119 (09-29-21 @ 10:56) (72 - 163)  BP: 119/91 (09-29-21 @ 10:38) (112/79 - 157/80)  RR: 20 (09-29-21 @ 10:38) (17 - 24)  SpO2: 97% (09-29-21 @ 10:56) (96% - 100%)  Wt(kg): --    General: Well nourished, no acute distress, alert and oriented x 3  Head: normocephalic, no trauma  Neck: no JVD, no bruit, supple, not enlarged  CV: rapid irregular S1S2, no S3, regular rate, no murmurs.    Lungs: clear BL, no rales or wheezes  Abdomen: bowel sounds +, soft, nontender, nondistended  Extremities: no clubbing, cyanosis or edema  Neuro: Moves all 4 extremities, sensation intact x 4 extremities  Skin: warm and moist, normal turgor  Psych: Mood and affect are appropriate for circumstances  MSK: normal range of motion and strength x4 extremities.    TELEMETRY: AF/RVR	    ECG: AF, narrow QRS	  	  LABS:	 	                          12.8   18.45 )-----------( 224      ( 29 Sep 2021 07:31 )             38.2     09-29    138  |  99  |  22  ----------------------------<  154<H>  5.6<H>   |  13<L>  |  0.90    Ca    9.9      29 Sep 2021 07:31  Phos  3.5     09-29  Mg     2.30     09-29      proBNP: Serum Pro-Brain Natriuretic Peptide: 1509 pg/mL (09-29 @ 07:43)    ASSESSMENT/PLAN: 	76y male with atrial fibrillation, unclear if persistent, in the setting of pneumonia.    Multiple prior documented AF events, unclear if this is paroxysmal or persistent.  He is under-covered for stroke prevention.  Heparin infusion for now.  Discharge on oral anticoagulation for [id number] of 3.  Rate control with oral diltiazem.  Can up-titrate to 60mg, q6hrs.  Apparently asymptomatic, but need to rule out tachy-mediated CHF re: abnormal BNP.  Echocardiogram re: abnormal BNP.  Will follow.      Herminio Burgess M.D.  Cardiac Electrophysiology    office [phone]  pager [phone]

## 2021-09-29 NOTE — PROGRESS NOTE ADULT - SUBJECTIVE AND OBJECTIVE BOX
Date of Service: 09-29-21 @ 12:58    Patient is a 76y old  Male who presents with a chief complaint of PNA (29 Sep 2021 12:32)      Any change in ROS: doing ok: no SOB :     MEDICATIONS  (STANDING):  aspirin enteric coated 81 milliGRAM(s) Oral daily  budesonide 160 MICROgram(s)/formoterol 4.5 MICROgram(s) Inhaler 2 Puff(s) Inhalation two times a day  budesonide 160 MICROgram(s)/formoterol 4.5 MICROgram(s) Inhaler 2 Puff(s) Inhalation two times a day  diltiazem    Tablet 30 milliGRAM(s) Oral every 6 hours  finasteride 5 milliGRAM(s) Oral daily  heparin  Infusion.  Unit(s)/Hr (14 mL/Hr) IV Continuous <Continuous>  ipratropium    for Nebulization 500 MICROGram(s) Nebulizer every 6 hours  levalbuterol Inhalation 0.63 milliGRAM(s) Inhalation every 6 hours  montelukast 10 milliGRAM(s) Oral daily  nebivolol 10 milliGRAM(s) Oral daily  piperacillin/tazobactam IVPB.. 3.375 Gram(s) IV Intermittent every 8 hours  zolpidem 10 milliGRAM(s) Oral at bedtime    MEDICATIONS  (PRN):  acetaminophen   Tablet .. 650 milliGRAM(s) Oral every 6 hours PRN Temp greater or equal to 38.5C (101.3F), Mild Pain (1 - 3)  aluminum hydroxide/magnesium hydroxide/simethicone Suspension 30 milliLiter(s) Oral every 4 hours PRN Dyspepsia  guaiFENesin Oral Liquid (Sugar-Free) 100 milliGRAM(s) Oral every 6 hours PRN Cough  heparin   Injectable 6500 Unit(s) IV Push every 6 hours PRN For aPTT less than 40  heparin   Injectable 3000 Unit(s) IV Push every 6 hours PRN For aPTT between 40 - 57  melatonin 3 milliGRAM(s) Oral at bedtime PRN Insomnia  ondansetron Injectable 4 milliGRAM(s) IV Push every 8 hours PRN Nausea and/or Vomiting    Vital Signs Last 24 Hrs  T(C): 37.1 (29 Sep 2021 06:08), Max: 37.2 (28 Sep 2021 20:00)  T(F): 98.7 (29 Sep 2021 06:08), Max: 98.9 (28 Sep 2021 20:00)  HR: 119 (29 Sep 2021 10:56) (72 - 163)  BP: 119/91 (29 Sep 2021 10:38) (112/79 - 157/80)  BP(mean): --  RR: 20 (29 Sep 2021 10:38) (17 - 24)  SpO2: 97% (29 Sep 2021 10:56) (96% - 100%)    I&O's Summary        Physical Exam:   GENERAL: NAD, well-groomed, well-developed  HEENT: AZUCENA/   Atraumatic, Normocephalic  ENMT: No tonsillar erythema, exudates, or enlargement; Moist mucous membranes, Good dentition, No lesions  NECK: Supple, No JVD, Normal thyroid  CHEST/LUNG: poor air entry   CVS: Regular rate and rhythm; No murmurs, rubs, or gallops  GI: : Soft, Nontender, Nondistended; Bowel sounds present  NERVOUS SYSTEM:  Alert & Oriented X3  EXTREMITIES: - edema  LYMPH: No lymphadenopathy noted  SKIN: No rashes or lesions  ENDOCRINOLOGY: No Thyromegaly  PSYCH: Appropriate    Labs:  26, 22, 24                            12.8   18.45 )-----------( 224      ( 29 Sep 2021 07:31 )             38.2                         12.8   19.08 )-----------( 184      ( 28 Sep 2021 07:34 )             37.8                         12.6   16.24 )-----------( 156      ( 27 Sep 2021 07:58 )             37.3                         14.8   13.35 )-----------( 172      ( 26 Sep 2021 10:52 )             43.0     09-29    138  |  99  |  22  ----------------------------<  154<H>  5.6<H>   |  13<L>  |  0.90  09-28    139  |  101  |  15  ----------------------------<  157<H>  3.7   |  23  |  0.86  09-27    136  |  100  |  15  ----------------------------<  115<H>  3.3<L>   |  21<L>  |  0.93  09-26    136  |  100  |  19  ----------------------------<  155<H>  3.8   |  20<L>  |  1.03    Ca    9.9      29 Sep 2021 07:31  Ca    10.1      28 Sep 2021 07:34  Phos  3.5     09-29  Phos  2.4     09-28  Mg     2.30     09-29  Mg     1.90     09-28    TPro  7.4  /  Alb  4.5  /  TBili  1.4<H>  /  DBili  x   /  AST  27  /  ALT  26  /  AlkPhos  57  09-27  TPro  7.9  /  Alb  4.6  /  TBili  1.3<H>  /  DBili  x   /  AST  29  /  ALT  30  /  AlkPhos  66  09-26    CAPILLARY BLOOD GLUCOSE            PTT - ( 29 Sep 2021 09:47 )  PTT:26.4 sec    Serum Pro-Brain Natriuretic Peptide: 1509 pg/mL (09-29 @ 07:43)        RECENT CULTURES:  09-27 @ 20:31 .Sputum Sputum       No polymorphonuclear leukocytes per low power field  No Squamous epithelial cells per low power field  Few Gram positive cocci in pairs per oil power field  Few Gram Negative Rods per oil power field           Normal Respiratory Stephanie present    09-27 @ 07:25 .Throat Throat                No beta hemolytic streptococci or Arcanobacterium haemolyticum isolated.  (Throat special examined for beta hemolytic streptococci and  Arcanobacterium haemolyticum)    09-26 @ 19:05 Clean Catch Clean Catch (Midstream)                >100,000 CFU/ml Klebsiella pneumoniae    09-26 @ 18:39 .Blood Blood-Peripheral       < from: CT Angio Chest PE Protocol w/ IV Cont (09.26.21 @ 14:59) >  Complications: None reported at time of study completion    PROCEDURE:  CT Angiography of the Chest.  Sagittal and coronal reformats were performed as well as 3D (MIP) reconstructions.    FINDINGS:    LUNGS AND AIRWAYS: Patent central airways.  Bilateral patchy and groundglass opacities.  PLEURA: Pleura is normal.  MEDIASTINUMAND RANDALL: The chest lymph nodes measure less than 10 mm in the short axis. The thyroid gland is normal. Arising from the aorta at the level of the thoracic inlet is a 3.4 x 1.5 cm outpouching that extends posterior to the left of midline and containsfluid and air.  VESSELS: No, central, lobar, segmental, or subsegmental pulmonary embolism. Aorta is normal in caliber. Aortic calcification.  HEART: Heart size is normal. No pericardial effusion. Coronary artery calcifications.  CHEST WALL AND LOWERNECK: Within normal limits.  VISUALIZED UPPER ABDOMEN: Hepatic steatosis.  BONES: Degenerative changes.    IMPRESSION:    1.  No pulmonary embolism.  2.  Bilateral patchy and groundglass opacities can occur in the setting of a variety of clinical settings including infection.  3.  The outpouching arising from the esophagus at thoracic inlet is suspicious for a Zenker's diverticulum.        --- End of Report ---            AVINASH CURRY MD; Resident Radiology  This document has been electronicallysigned.  WYATT SCOTT MD; Attending Radiologist  This document has been electronically signed. Sep 26 2021  3:30PM    < end of copied text >           No growth to date.    09-26 @ 18:36 .Blood Blood-Peripheral                No growth to date.          RESPIRATORY CULTURES:          Studies  Chest X-RAY  CT SCAN Chest   Venous Dopplers: LE:   CT Abdomen  Others

## 2021-09-29 NOTE — PROGRESS NOTE ADULT - ASSESSMENT
77yo M w/ PMH HTN, A-fib, asthma, BPH, and chronic back pain (herniated discs, spinal stenosis) presents with cough x 1 now, worsening over the past few days now with fever (Tmax 103 at home), SOB, sputum production, and sore throat. In ED, was febrile to 104 F and tachy to 140s with leukocytosis of 13 and elevated lactate to 2.5 diagnosed with sepsis secondary to pneumonia now with A Fib with RVR

## 2021-09-29 NOTE — PROGRESS NOTE ADULT - NSPROGADDITIONALINFOA_GEN_ALL_CORE
discussed with patient in detail, expresses understanding of treatment plans.  discussed with patient's wife at bedside in detail    discussed with covering ACP  discussed with cardiology   encounter 40 min

## 2021-09-29 NOTE — PROGRESS NOTE ADULT - ASSESSMENT
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 I discussed the care with the patient and their family. The plan of care was discussed with the physician assistant and modifications were made to the notation where appropriate. Differential diagnosis and plan of care discussed with patient after the evaluation. Advanced care planning was discussed with patient and family.  Advanced care planning forms were reviewed and discussed.  Risks, benefits and alternatives of gastroenterologic procedures were discussed in detail and all questions were answered. 35 minutes spent on total encounter of which more than fifty percent of the encounter was spent counseling and/or coordinating care by the attending physician.      76 year old male with fever, cough and dysphagia     Dysphagia   most likely related to his Zenker's Diverticulum  Pending X-ray Esophagram once his PNA has improved (?Friday)  outpt f/u with [facility] evaluation; input appreciated    Dysphagia 3 diet per SLP     Pneumonia/ SOB   cont Abx duration per ID   further management per primary team     Afib   rate control   gi ppx with protonix daily on a/c     I reviewed the overnight course of events on the unit, re-confirming the patient history. I discussed the care with the patient and their family. The plan of care was discussed with the physician assistant and modifications were made to the notation where appropriate. Differential diagnosis and plan of care discussed with patient after the evaluation. Advanced care planning was discussed with patient and family.  Advanced care planning forms were reviewed and discussed.  Risks, benefits and alternatives of gastroenterologic procedures were discussed in detail and all questions were answered. 35 minutes spent on total encounter of which more than fifty percent of the encounter was spent counseling and/or coordinating care by the attending physician.

## 2021-09-29 NOTE — SWALLOW BEDSIDE ASSESSMENT ADULT - ESOPHAGEAL PHASE
gurgling immediately following sips and delayed cough with expectoration delayed cough with expectoration of undigested solid

## 2021-09-29 NOTE — SWALLOW BEDSIDE ASSESSMENT ADULT - H & P REVIEW
As per Internal Medicine Note: 77yo M w/ PMH HTN, A-fib, asthma, BPH, and chronic back pain (herniated discs, spinal stenosis) presents with cough x 1 now, worsening over the past few days now with fever (Tmax 103 at home), SOB, sputum production, and sore throat. In ED, was febrile to 104 F and tachy to 140s with leukocytosis of 13 and elevated lactate to 2.5 Now s/p 2L NS bolus, lactate downtrended to 1.5. CXR showed b/l lower lung opacities c/f PNA. CTA chest showed b/l patchy, ground glass opacities that may represent infection, as well as, an outpouching of the esophagus near thoracic inlet suspicious for Zenker's diverticulum. No PE. Of note pt, also does self-catheterizations at home, without any urinary complaints. UA showing 187 WBCs, large LE, neg nitrite. Admitted to medicine for likely PNA, pending other infectious workup./yes

## 2021-09-29 NOTE — PROGRESS NOTE ADULT - PROBLEM SELECTOR PLAN 8
he has asthma and wheezing with poor air entry : cont steroids IV for one more day as well as BD and Symbicort: pt has been strongly advised to stop smoking and drinking!    9/29: cont BD and steroids: can change to xopenex if her heart rate is not controled

## 2021-09-29 NOTE — PROGRESS NOTE ADULT - SUBJECTIVE AND OBJECTIVE BOX
INTERVAL HPI/OVERNIGHT EVENTS:    "i feel fine" "i swallow just fine"  no c/o n/v or abd pain    MEDICATIONS  (STANDING):  aspirin enteric coated 81 milliGRAM(s) Oral daily  budesonide 160 MICROgram(s)/formoterol 4.5 MICROgram(s) Inhaler 2 Puff(s) Inhalation two times a day  budesonide 160 MICROgram(s)/formoterol 4.5 MICROgram(s) Inhaler 2 Puff(s) Inhalation two times a day  diltiazem    Tablet 30 milliGRAM(s) Oral every 6 hours  finasteride 5 milliGRAM(s) Oral daily  heparin  Infusion.  Unit(s)/Hr (14 mL/Hr) IV Continuous <Continuous>  ipratropium    for Nebulization 500 MICROGram(s) Nebulizer every 6 hours  levalbuterol Inhalation 0.63 milliGRAM(s) Inhalation every 6 hours  montelukast 10 milliGRAM(s) Oral daily  nebivolol 10 milliGRAM(s) Oral daily  piperacillin/tazobactam IVPB.. 3.375 Gram(s) IV Intermittent every 8 hours  zolpidem 10 milliGRAM(s) Oral at bedtime    MEDICATIONS  (PRN):  acetaminophen   Tablet .. 650 milliGRAM(s) Oral every 6 hours PRN Temp greater or equal to 38.5C (101.3F), Mild Pain (1 - 3)  aluminum hydroxide/magnesium hydroxide/simethicone Suspension 30 milliLiter(s) Oral every 4 hours PRN Dyspepsia  guaiFENesin Oral Liquid (Sugar-Free) 100 milliGRAM(s) Oral every 6 hours PRN Cough  heparin   Injectable 6500 Unit(s) IV Push every 6 hours PRN For aPTT less than 40  heparin   Injectable 3000 Unit(s) IV Push every 6 hours PRN For aPTT between 40 - 57  melatonin 3 milliGRAM(s) Oral at bedtime PRN Insomnia  ondansetron Injectable 4 milliGRAM(s) IV Push every 8 hours PRN Nausea and/or Vomiting      Allergies    No Known Allergies    Intolerances        Review of Systems:    General:  No wt loss, fevers, chills, night sweats, fatigue   Eyes:  Good vision, no reported pain  ENT:  No sore throat, pain, runny nose, dysphagia  CV:  No pain, palpitations, hypo/hypertension  Resp:  No dyspnea, cough, tachypnea, wheezing  GI:  No pain, No nausea, No vomiting, No diarrhea, No constipation, No weight loss, No fever, No pruritis, No rectal bleeding, No melena, No dysphagia  :  No pain, bleeding, incontinence, nocturia  Muscle:  No pain, weakness  Neuro:  No weakness, tingling, memory problems  Psych:  No fatigue, insomnia, mood problems, depression  Endocrine:  No polyuria, polydypsia, cold/heat intolerance  Heme:  No petechiae, ecchymosis, easy bruisability  Skin:  No rash, tattoos, scars, edema      Vital Signs Last 24 Hrs  T(C): 37.1 (29 Sep 2021 06:08), Max: 37.2 (28 Sep 2021 20:00)  T(F): 98.7 (29 Sep 2021 06:08), Max: 98.9 (28 Sep 2021 20:00)  HR: 119 (29 Sep 2021 10:56) (72 - 163)  BP: 119/91 (29 Sep 2021 10:38) (112/79 - 157/80)  BP(mean): --  RR: 20 (29 Sep 2021 10:38) (17 - 24)  SpO2: 97% (29 Sep 2021 10:56) (96% - 100%)    PHYSICAL EXAM:    Constitutional: NAD  HEENT: EOMI, throat clear  Neck: No LAD, supple  Respiratory: CTA and P  Cardiovascular: S1 and S2, RRR, no M  Gastrointestinal: BS+, soft, NT/ND, neg HSM,  Extremities: No peripheral edema, neg clubbing, cyanosis  Vascular: 2+ peripheral pulses  Neurological: A/O x 3, no focal deficits  Psychiatric: Normal mood, normal affect  Skin: No rashes      LABS:                        12.8   18.45 )-----------( 224      ( 29 Sep 2021 07:31 )             38.2     09-29    138  |  99  |  22  ----------------------------<  154<H>  5.6<H>   |  13<L>  |  0.90    Ca    9.9      29 Sep 2021 07:31  Phos  3.5     09-29  Mg     2.30     09-29      PTT - ( 29 Sep 2021 09:47 )  PTT:26.4 sec      RADIOLOGY & ADDITIONAL TESTS:   INTERVAL HPI/OVERNIGHT EVENTS:    c/o some mild dysphagia and coughing with PO      MEDICATIONS  (STANDING):  aspirin enteric coated 81 milliGRAM(s) Oral daily  budesonide 160 MICROgram(s)/formoterol 4.5 MICROgram(s) Inhaler 2 Puff(s) Inhalation two times a day  budesonide 160 MICROgram(s)/formoterol 4.5 MICROgram(s) Inhaler 2 Puff(s) Inhalation two times a day  diltiazem    Tablet 30 milliGRAM(s) Oral every 6 hours  finasteride 5 milliGRAM(s) Oral daily  heparin  Infusion.  Unit(s)/Hr (14 mL/Hr) IV Continuous <Continuous>  ipratropium    for Nebulization 500 MICROGram(s) Nebulizer every 6 hours  levalbuterol Inhalation 0.63 milliGRAM(s) Inhalation every 6 hours  montelukast 10 milliGRAM(s) Oral daily  nebivolol 10 milliGRAM(s) Oral daily  piperacillin/tazobactam IVPB.. 3.375 Gram(s) IV Intermittent every 8 hours  zolpidem 10 milliGRAM(s) Oral at bedtime    MEDICATIONS  (PRN):  acetaminophen   Tablet .. 650 milliGRAM(s) Oral every 6 hours PRN Temp greater or equal to 38.5C (101.3F), Mild Pain (1 - 3)  aluminum hydroxide/magnesium hydroxide/simethicone Suspension 30 milliLiter(s) Oral every 4 hours PRN Dyspepsia  guaiFENesin Oral Liquid (Sugar-Free) 100 milliGRAM(s) Oral every 6 hours PRN Cough  heparin   Injectable 6500 Unit(s) IV Push every 6 hours PRN For aPTT less than 40  heparin   Injectable 3000 Unit(s) IV Push every 6 hours PRN For aPTT between 40 - 57  melatonin 3 milliGRAM(s) Oral at bedtime PRN Insomnia  ondansetron Injectable 4 milliGRAM(s) IV Push every 8 hours PRN Nausea and/or Vomiting      Allergies    No Known Allergies    Intolerances        Review of Systems:    General:  No wt loss, fevers, chills, night sweats, fatigue   Eyes:  Good vision, no reported pain  ENT:  No sore throat, pain, runny nose, dysphagia  CV:  No pain, palpitations, hypo/hypertension  Resp:  No dyspnea, cough, tachypnea, wheezing  GI:  No pain, No nausea, No vomiting, No diarrhea, No constipation, No weight loss, No fever, No pruritis, No rectal bleeding, No melena, No dysphagia  :  No pain, bleeding, incontinence, nocturia  Muscle:  No pain, weakness  Neuro:  No weakness, tingling, memory problems  Psych:  No fatigue, insomnia, mood problems, depression  Endocrine:  No polyuria, polydypsia, cold/heat intolerance  Heme:  No petechiae, ecchymosis, easy bruisability  Skin:  No rash, tattoos, scars, edema      Vital Signs Last 24 Hrs  T(C): 37.1 (29 Sep 2021 06:08), Max: 37.2 (28 Sep 2021 20:00)  T(F): 98.7 (29 Sep 2021 06:08), Max: 98.9 (28 Sep 2021 20:00)  HR: 119 (29 Sep 2021 10:56) (72 - 163)  BP: 119/91 (29 Sep 2021 10:38) (112/79 - 157/80)  BP(mean): --  RR: 20 (29 Sep 2021 10:38) (17 - 24)  SpO2: 97% (29 Sep 2021 10:56) (96% - 100%)    PHYSICAL EXAM:    Constitutional: NAD  HEENT: EOMI, throat clear  Neck: No LAD, supple  Respiratory: CTA and P  Cardiovascular: S1 and S2, RRR, no M  Gastrointestinal: BS+, soft, NT/ND, neg HSM,  Extremities: No peripheral edema, neg clubbing, cyanosis  Vascular: 2+ peripheral pulses  Neurological: A/O x 3, no focal deficits  Psychiatric: Normal mood, normal affect  Skin: No rashes      LABS:                        12.8   18.45 )-----------( 224      ( 29 Sep 2021 07:31 )             38.2     09-29    138  |  99  |  22  ----------------------------<  154<H>  5.6<H>   |  13<L>  |  0.90    Ca    9.9      29 Sep 2021 07:31  Phos  3.5     09-29  Mg     2.30     09-29      PTT - ( 29 Sep 2021 09:47 )  PTT:26.4 sec      RADIOLOGY & ADDITIONAL TESTS:

## 2021-09-29 NOTE — SWALLOW BEDSIDE ASSESSMENT ADULT - SWALLOW EVAL: DIAGNOSIS
Patient consumed trials of puree, solids, honey thick liquids, nectar thick liquids and thin liquids.  Oral phase characterized by adequate acceptance, adequate anterior bolus containment, functional mastication and manipulation, anterior to posterior transport and adequate oral cavity clearance. Pharyngeal phase characterized by initiation of the pharyngeal swallow and hyolaryngeal elevation and excursion noted upon palpation.  Noted gurgling sound following thin liquid trials.  Patient denied globus sensation.  Delayed coughing following thin liquids with expectoration of undigested food.  No clinically overt signs or symptoms of aspiration across trials of puree, solids, honey thick liquids and nectar thick liquids.  Unable to determine if signs of aspiration is due to pharyngeal dysphagia or due to an esophageal dysphagia.  Patient would benefit from cinesophagram and esophagram.

## 2021-09-29 NOTE — CONSULT NOTE ADULT - SUBJECTIVE AND OBJECTIVE BOX
DATE of service 9/29/2021    HISTORY OF PRESENT ILLNESS: HPI:    77yo M w/ PMH HTN, A-fib (on ASA only), asthma, BPH, and chronic back pain (herniated discs, spinal stenosis) presents with worsening cough a/w fever and SOB. Pt states that he has been coughing for over a month now with a sore throat, worsening over the past 3-4 days with increasing sputum production and difficulty breathing. Spiked a 103 F fever today. Patient has a history of asthma and is supposed to use his symbicort 2x a day that he's not very compliant with. But since he's been coughing, he's been using it twice a day. Patient reports chest tightness and HA during coughing fits. No n/v/d, abdominal pain, CP, or recent travel. Has been hospitalized twice in the past for pneumonia (5 years ago, in setting of post-op). Never been hospitalized for asthma or required intubation.     He also has a sore throat that started after he started coughing. Denies difficulty swallowing but has noticed intermittent coughing while eating over the past 6 months. Denies foul breath/odor.   Of note, patient has a h/o BPH and had a TURP 10 yrs ago c/b ?nerve injury- so he has been doing intermittent self-catheterizations for the past 10 yrs, max 2x a day, less if he's home and can conveniently use the bathroom. Have not noticed any worsening urinary frequency or other urinary sxs. Denies dysuria, urinary urgency, hematuria.       PAST MEDICAL & SURGICAL HISTORY:  HTN (hypertension)    OA (osteoarthritis)    Spinal stenosis    Asthma    BPH (benign prostatic hyperplasia)    S/p total knee replacement, bilateral      MEDICATIONS  (STANDING):  aspirin enteric coated 81 milliGRAM(s) Oral daily  budesonide 160 MICROgram(s)/formoterol 4.5 MICROgram(s) Inhaler 2 Puff(s) Inhalation two times a day  budesonide 160 MICROgram(s)/formoterol 4.5 MICROgram(s) Inhaler 2 Puff(s) Inhalation two times a day  diltiazem    Tablet 30 milliGRAM(s) Oral every 6 hours  finasteride 5 milliGRAM(s) Oral daily  heparin  Infusion.  Unit(s)/Hr (14 mL/Hr) IV Continuous <Continuous>  ipratropium    for Nebulization 500 MICROGram(s) Nebulizer every 6 hours  levalbuterol Inhalation 0.63 milliGRAM(s) Inhalation every 6 hours  montelukast 10 milliGRAM(s) Oral daily  nebivolol 10 milliGRAM(s) Oral daily  piperacillin/tazobactam IVPB.. 3.375 Gram(s) IV Intermittent every 8 hours  predniSONE   Tablet 20 milliGRAM(s) Oral every 12 hours  zolpidem 10 milliGRAM(s) Oral at bedtime      Allergies  No Known Allergies      FAMILY HISTORY:  FH: lung cancer (Mother)  FH: type 2 diabetes (Father)  Family history of CKD (chronic kidney disease) (Father)  Non-contributary for premature coronary disease or sudden cardiac death    SOCIAL HISTORY:    [x ] Non-smoker  [ ] Smoker  [ ] Alcohol    FLU VACCINE THIS YEAR STARTS IN AUGUST:  [ ] Yes    [ ] No    IF OVER 65 HAVE YOU EVER HAD A PNA VACCINE:  [ ] Yes    [ ] No       [ ] N/A      REVIEW OF SYSTEMS:  [ ]chest pain  [ x]shortness of breath  [  ]palpitations  [  ]syncope  [ ]near syncope [ ]upper extremity weakness   [ ] lower extremity weakness  [  ]diplopia  [  ]altered mental status   [  ]fevers  [ ]chills [ ]nausea  [ ]vomiting  [  ]dysphagia    [ ]abdominal pain  [ ]melena  [ ]BRBPR    [  ]epistaxis  [  ]rash    [ ]lower extremity edema        [ ] All others negative	  [ ] Unable to obtain      LABS:	 	    CARDIAC MARKERS:  Trop T 12, 12                        12.8   18.45 )-----------( 224      ( 29 Sep 2021 07:31 )             38.2     138  |  99  |  22  ----------------------------<  154<H>  5.6<H>   |  13<L>  |  0.90    Ca    9.9      29 Sep 2021 07:31  Phos  3.5     09-29  Mg     2.30     09-29      Creatinine Trend: 0.90<--, 0.86<--, 0.93<--, 1.03<--    Coags:  PTT - ( 29 Sep 2021 09:47 )  PTT:26.4 sec    proBNP: Serum Pro-Brain Natriuretic Peptide: 1509 pg/mL (09-29 @ 07:43)    PHYSICAL EXAM:  T(C): 37.1 (09-29-21 @ 06:08), Max: 37.2 (09-28-21 @ 20:00)  HR: 119 (09-29-21 @ 10:56) (72 - 163)  BP: 119/91 (09-29-21 @ 10:38) (112/79 - 157/80)  RR: 20 (09-29-21 @ 10:38) (17 - 24)  SpO2: 97% (09-29-21 @ 10:56) (96% - 100%)  Wt(kg): --   BMI (kg/m2): 24.4 (09-26-21 @ 10:58)    Gen: Appears well in NAD  HEENT:  (-)icterus (-)pallor  CV: N S1 S2 1/6 DUSTIN (+)2 Pulses B/l  Resp:  Clear to ausculatation B/L, normal effort  GI: (+) BS Soft, NT, ND  Lymph:  (-)Edema, (-)obvious lymphadenopathy  Skin: Warm to touch, Normal turgor  Psych: Appropriate mood and affect    TELEMETRY: AF 140s	      ECG:  AF with RVR	    RADIOLOGY:      < from: CT Angio Chest PE Protocol w/ IV Cont (09.26.21 @ 14:59) >  IMPRESSION:  1.  No pulmonary embolism.  2.  Bilateral patchy and groundglass opacities can occur in the setting of a variety of clinical settings including infection.  3.  The outpouching arising from the esophagus at thoracic inlet is suspicious for a Zenker's diverticulum.  < end of copied text >            ASSESSMENT/PLAN: 	77yo M w/ PMH HTN, A-fib (on ASA only, OP Cardio Dr Torres), HTN, asthma, BPH, and chronic back pain (herniated discs, spinal stenosis) presents with worsening cough a/w fever and SOB, found with presumed aspiration PNA    --Cardiology consulted for Rapid AFib and SOB  --Pt not on any AVN at home  --start Cardizem 30 Q6  --hold on BBlockers given bronchospasm  --EP consult DR Burgess  --[id number], recommend lifelong AC.  D/w patient and wife in detail  --IV abx per medicine  --check TTE

## 2021-09-29 NOTE — PROGRESS NOTE ADULT - PROBLEM SELECTOR PLAN 6
urine culture testing positive for Klebsiella   maybe just colonization  defer recommendations to ID  already on piperacillin/tazobactam

## 2021-09-29 NOTE — CONSULT NOTE ADULT - CONSULT REQUESTED DATE/TIME
27-Sep-2021 13:00
27-Sep-2021 14:42
29-Sep-2021 15:13
29-Sep-2021 15:28
27-Sep-2021 12:39
27-Sep-2021 15:08

## 2021-09-29 NOTE — CONSULT NOTE ADULT - ATTENDING COMMENTS
Patient seen and examined.  Agree with above.   Continue with ac for cva prevention and elevated chads-vasc score  Rate control with cardizem  Will need echocardiogram to evaluate LV function and elevate BNP   Further workup pending above    Brian Kaur MD
Seen/discussed with fellow  Pertinent labs and imaging reviewed personally  Agree with above

## 2021-09-29 NOTE — SWALLOW BEDSIDE ASSESSMENT ADULT - COMMENTS
As per GI Note: #Hypoxic respiratory failure due to multilobular pneumonia - Suspect aspiration in setting of Zenker's diverticulum  #Esophageal diverticulum -  Incidental finding at esophageal inlet on CT chest; associated with regurgitation, chronic cough and likely microaspiration, no halitosis or dysphagia.  #Sepsis due to multilobar aspiration pneumonia - On IV antibiotics and supplemental oxygen  # A fib not on anticoagulation  #Asthma  Plan:  1. Patient electing for outpatient follow up with [facility] to discuss possible endoscopic repair of Zenker's diverticulum by Dr. Gregor Kaplan  2.  can obtain X-ray esophagram for further imaging of Zenker's diverticulum when PNA resolved  3. dietary recommendations as per SLP evaluation  4. Please notify [facility] team when X-ray esophagram has been completed if patient remains hospitalized.    Chest CT: 1. No pulmonary embolism.  2. Bilateral patchy and groundglass opacities can occur in the setting of a variety of clinical settings including infection.  3. The outpouching arising from the esophagus at thoracic inlet is suspicious for a Zenker's diverticulum.    SLP received patient sitting upright in bed in [facility], awake, alert, able to follow directions, answer questions and engage in conversation. Patient reports that he coughs throughout the day “even on my saliva” but coughing increases at the end of a meal.  Patient reports globus sensation while point to the base of his neck.

## 2021-09-29 NOTE — SWALLOW BEDSIDE ASSESSMENT ADULT - SWALLOW EVAL: RECOMMENDED DIET
1. soft solids (dysphagia 3) and nectar thick liquids 2. Cinesophagram and esophagram to determine pharyngeal versus esophageal dysphagia given patient reported symptoms and presentation and pneumonia

## 2021-09-30 ENCOUNTER — TRANSCRIPTION ENCOUNTER (OUTPATIENT)
Age: 76
End: 2021-09-30

## 2021-09-30 LAB
ANION GAP SERPL CALC-SCNC: 15 MMOL/L — HIGH (ref 7–14)
APTT BLD: 71.2 SEC — HIGH (ref 27–36.3)
APTT BLD: 96.3 SEC — HIGH (ref 27–36.3)
BUN SERPL-MCNC: 24 MG/DL — HIGH (ref 7–23)
CALCIUM SERPL-MCNC: 9.8 MG/DL — SIGNIFICANT CHANGE UP (ref 8.4–10.5)
CHLORIDE SERPL-SCNC: 99 MMOL/L — SIGNIFICANT CHANGE UP (ref 98–107)
CO2 SERPL-SCNC: 21 MMOL/L — LOW (ref 22–31)
CREAT SERPL-MCNC: 0.99 MG/DL — SIGNIFICANT CHANGE UP (ref 0.5–1.3)
GLUCOSE SERPL-MCNC: 160 MG/DL — HIGH (ref 70–99)
HCT VFR BLD CALC: 39.3 % — SIGNIFICANT CHANGE UP (ref 39–50)
HGB BLD-MCNC: 13.3 G/DL — SIGNIFICANT CHANGE UP (ref 13–17)
MAGNESIUM SERPL-MCNC: 2.3 MG/DL — SIGNIFICANT CHANGE UP (ref 1.6–2.6)
MCHC RBC-ENTMCNC: 31.7 PG — SIGNIFICANT CHANGE UP (ref 27–34)
MCHC RBC-ENTMCNC: 33.8 GM/DL — SIGNIFICANT CHANGE UP (ref 32–36)
MCV RBC AUTO: 93.6 FL — SIGNIFICANT CHANGE UP (ref 80–100)
NRBC # BLD: 0 /100 WBCS — SIGNIFICANT CHANGE UP
NRBC # FLD: 0 K/UL — SIGNIFICANT CHANGE UP
PHOSPHATE SERPL-MCNC: 2.9 MG/DL — SIGNIFICANT CHANGE UP (ref 2.5–4.5)
PLATELET # BLD AUTO: 217 K/UL — SIGNIFICANT CHANGE UP (ref 150–400)
POTASSIUM SERPL-MCNC: 4.5 MMOL/L — SIGNIFICANT CHANGE UP (ref 3.5–5.3)
POTASSIUM SERPL-SCNC: 4.5 MMOL/L — SIGNIFICANT CHANGE UP (ref 3.5–5.3)
RBC # BLD: 4.2 M/UL — SIGNIFICANT CHANGE UP (ref 4.2–5.8)
RBC # FLD: 13 % — SIGNIFICANT CHANGE UP (ref 10.3–14.5)
SODIUM SERPL-SCNC: 135 MMOL/L — SIGNIFICANT CHANGE UP (ref 135–145)
WBC # BLD: 13.56 K/UL — HIGH (ref 3.8–10.5)
WBC # FLD AUTO: 13.56 K/UL — HIGH (ref 3.8–10.5)

## 2021-09-30 PROCEDURE — 99232 SBSQ HOSP IP/OBS MODERATE 35: CPT

## 2021-09-30 PROCEDURE — 74230 X-RAY XM SWLNG FUNCJ C+: CPT | Mod: 26

## 2021-09-30 RX ORDER — APIXABAN 2.5 MG/1
1 TABLET, FILM COATED ORAL
Qty: 60 | Refills: 0
Start: 2021-09-30 | End: 2021-10-29

## 2021-09-30 RX ORDER — RIVAROXABAN 15 MG-20MG
1 KIT ORAL
Qty: 30 | Refills: 0
Start: 2021-09-30 | End: 2021-10-29

## 2021-09-30 RX ORDER — APIXABAN 2.5 MG/1
5 TABLET, FILM COATED ORAL EVERY 12 HOURS
Refills: 0 | Status: DISCONTINUED | OUTPATIENT
Start: 2021-09-30 | End: 2021-10-01

## 2021-09-30 RX ADMIN — FINASTERIDE 5 MILLIGRAM(S): 5 TABLET, FILM COATED ORAL at 13:26

## 2021-09-30 RX ADMIN — Medication 20 MILLIGRAM(S): at 06:37

## 2021-09-30 RX ADMIN — Medication 60 MILLIGRAM(S): at 13:25

## 2021-09-30 RX ADMIN — Medication 500 MICROGRAM(S): at 22:59

## 2021-09-30 RX ADMIN — PIPERACILLIN AND TAZOBACTAM 25 GRAM(S): 4; .5 INJECTION, POWDER, LYOPHILIZED, FOR SOLUTION INTRAVENOUS at 15:11

## 2021-09-30 RX ADMIN — BUDESONIDE AND FORMOTEROL FUMARATE DIHYDRATE 2 PUFF(S): 160; 4.5 AEROSOL RESPIRATORY (INHALATION) at 08:30

## 2021-09-30 RX ADMIN — MONTELUKAST 10 MILLIGRAM(S): 4 TABLET, CHEWABLE ORAL at 13:26

## 2021-09-30 RX ADMIN — Medication 60 MILLIGRAM(S): at 18:41

## 2021-09-30 RX ADMIN — APIXABAN 5 MILLIGRAM(S): 2.5 TABLET, FILM COATED ORAL at 18:41

## 2021-09-30 RX ADMIN — BUDESONIDE AND FORMOTEROL FUMARATE DIHYDRATE 2 PUFF(S): 160; 4.5 AEROSOL RESPIRATORY (INHALATION) at 23:39

## 2021-09-30 RX ADMIN — NEBIVOLOL HYDROCHLORIDE 10 MILLIGRAM(S): 5 TABLET ORAL at 06:44

## 2021-09-30 RX ADMIN — Medication 100 MILLIGRAM(S): at 00:13

## 2021-09-30 RX ADMIN — Medication 500 MICROGRAM(S): at 15:17

## 2021-09-30 RX ADMIN — HEPARIN SODIUM 1600 UNIT(S)/HR: 5000 INJECTION INTRAVENOUS; SUBCUTANEOUS at 10:01

## 2021-09-30 RX ADMIN — PIPERACILLIN AND TAZOBACTAM 25 GRAM(S): 4; .5 INJECTION, POWDER, LYOPHILIZED, FOR SOLUTION INTRAVENOUS at 06:39

## 2021-09-30 RX ADMIN — LEVALBUTEROL 0.63 MILLIGRAM(S): 1.25 SOLUTION, CONCENTRATE RESPIRATORY (INHALATION) at 10:14

## 2021-09-30 RX ADMIN — Medication 81 MILLIGRAM(S): at 13:26

## 2021-09-30 RX ADMIN — Medication 60 MILLIGRAM(S): at 23:39

## 2021-09-30 RX ADMIN — LEVALBUTEROL 0.63 MILLIGRAM(S): 1.25 SOLUTION, CONCENTRATE RESPIRATORY (INHALATION) at 23:00

## 2021-09-30 RX ADMIN — LEVALBUTEROL 0.63 MILLIGRAM(S): 1.25 SOLUTION, CONCENTRATE RESPIRATORY (INHALATION) at 15:17

## 2021-09-30 RX ADMIN — Medication 100 MILLIGRAM(S): at 07:01

## 2021-09-30 RX ADMIN — HEPARIN SODIUM 1600 UNIT(S)/HR: 5000 INJECTION INTRAVENOUS; SUBCUTANEOUS at 00:19

## 2021-09-30 RX ADMIN — ZOLPIDEM TARTRATE 10 MILLIGRAM(S): 10 TABLET ORAL at 23:38

## 2021-09-30 RX ADMIN — Medication 500 MICROGRAM(S): at 10:13

## 2021-09-30 RX ADMIN — PIPERACILLIN AND TAZOBACTAM 25 GRAM(S): 4; .5 INJECTION, POWDER, LYOPHILIZED, FOR SOLUTION INTRAVENOUS at 23:38

## 2021-09-30 RX ADMIN — Medication 60 MILLIGRAM(S): at 06:37

## 2021-09-30 RX ADMIN — Medication 20 MILLIGRAM(S): at 18:41

## 2021-09-30 NOTE — DISCHARGE NOTE PROVIDER - NSDCQMSTROKE_NEU_ALL_CORE
No [Fever] : no fever [Chills] : no chills [Eye Pain] : no eye pain [Sore Throat] : no sore throat [Hoarseness] : no hoarseness [Chest Pain] : no chest pain [Palpitations] : no palpitations [SOB on Exertion] : no shortness of breath during exertion [Dysuria] : no dysuria [Arthralgias] : arthralgias [Joint Swelling] : no joint swelling [Joint Stiffness] : joint stiffness [Skin Lesions] : no skin lesions [Difficulty Walking] : difficulty walking [Depression] : no depression [Muscle Weakness] : no muscle weakness [Feelings Of Weakness] : no feelings of weakness [Easy Bleeding] : no tendency for easy bleeding [Easy Bruising] : no tendency for easy bruising

## 2021-09-30 NOTE — DISCHARGE NOTE PROVIDER - CARE PROVIDER_API CALL
Kaushik Raymundo)  Critical Care Medicine  [address] [place] [address], [facility]4th [place] [address]  Phone: [phone]  Fax: [phone]  Established Patient  Follow Up Time: 2 weeks   Kaushik Raymundo)  Critical Care Medicine  [address] [place] [address], [facility] (4th [place] [address]  Phone: [phone]  Fax: [phone]  Established Patient  Scheduled Appointment: 10/11/2021 04:45 PM    Herminio Burgess)  Cardiovascular Disease; Internal Medicine  [address] [address]  [place] [address]  Phone: [phone]  Fax: [phone]  Follow Up Time: 1 week    Maurice Vuong)  Gastroenterology; Internal Medicine  [address] [place] [address] [address]  [place] [address]  Phone: [phone]  Fax: [phone]  Follow Up Time: Routine    Gregor Kaplan  Gastroenterology  [address] [place] [address]  Phone: [phone]  Fax: [phone]  Follow Up Time: Routine

## 2021-09-30 NOTE — DISCHARGE NOTE PROVIDER - HOSPITAL COURSE
76 year old male w/ PMH HTN, A-fib, asthma, BPH, and chronic back pain (herniated discs, spinal stenosis) presents with cough x 1 now, worsening over the past few days now with fever (Tmax 103 at home), SOB, sputum production, and sore throat. In ED, was febrile to 104 F and tachy to 140s with leukocytosis of 13 and elevated lactate to 2.5 diagnosed with sepsis secondary to pneumonia.  Hospital course complicated by a-fib w/RVR.  He was treated with Zosyn for pneumonia for aspiration pneumonia likely due to underlying Zenkers.  Cinesophogram was performed with no evidence of oral-pharyngeal aspiration.   Cardiology & EP consulted for a-fib w/RVR, echocardiogram performed w/EF 60%, patient was started on Cardizem for rate control with improvement & started on AC (CHADSVASC score 3).  [facility] was consulted for Zenkers Diverticulum and recommend outpatient follow up.      On 10/1/2021 patient medically cleared for discharge home by Dr. Heard, all consultants in agreement.      Follow up appointment for discharge made for patient w/PCP 10/11/21 @ 6708, PCP unable to accommodate at earlier date.

## 2021-09-30 NOTE — SWALLOW VFSS/MBS ASSESSMENT ADULT - ORAL PHASE COMMENTS
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

## 2021-09-30 NOTE — DISCHARGE NOTE PROVIDER - CARE PROVIDERS DIRECT ADDRESSES
,DirectAddress_Unknown ,DirectAddress_Unknown,DirectAddress_Unknown,[email or web address]

## 2021-09-30 NOTE — PROGRESS NOTE ADULT - SUBJECTIVE AND OBJECTIVE BOX
INTERVAL HPI/OVERNIGHT EVENTS:    overall reports feeling better   went for cinesophagogram this morning     MEDICATIONS  (STANDING):  aspirin enteric coated 81 milliGRAM(s) Oral daily  budesonide 160 MICROgram(s)/formoterol 4.5 MICROgram(s) Inhaler 2 Puff(s) Inhalation two times a day  budesonide 160 MICROgram(s)/formoterol 4.5 MICROgram(s) Inhaler 2 Puff(s) Inhalation two times a day  diltiazem    Tablet 60 milliGRAM(s) Oral every 6 hours  finasteride 5 milliGRAM(s) Oral daily  heparin  Infusion.  Unit(s)/Hr (14 mL/Hr) IV Continuous <Continuous>  ipratropium    for Nebulization 500 MICROGram(s) Nebulizer every 6 hours  levalbuterol Inhalation 0.63 milliGRAM(s) Inhalation every 6 hours  montelukast 10 milliGRAM(s) Oral daily  nebivolol 10 milliGRAM(s) Oral daily  piperacillin/tazobactam IVPB.. 3.375 Gram(s) IV Intermittent every 8 hours  predniSONE   Tablet 20 milliGRAM(s) Oral every 12 hours  zolpidem 10 milliGRAM(s) Oral at bedtime    MEDICATIONS  (PRN):  acetaminophen   Tablet .. 650 milliGRAM(s) Oral every 6 hours PRN Temp greater or equal to 38.5C (101.3F), Mild Pain (1 - 3)  aluminum hydroxide/magnesium hydroxide/simethicone Suspension 30 milliLiter(s) Oral every 4 hours PRN Dyspepsia  benzonatate 100 milliGRAM(s) Oral three times a day PRN Cough  guaiFENesin Oral Liquid (Sugar-Free) 100 milliGRAM(s) Oral every 6 hours PRN Cough  heparin   Injectable 6500 Unit(s) IV Push every 6 hours PRN For aPTT less than 40  heparin   Injectable 3000 Unit(s) IV Push every 6 hours PRN For aPTT between 40 - 57  melatonin 3 milliGRAM(s) Oral at bedtime PRN Insomnia  ondansetron Injectable 4 milliGRAM(s) IV Push every 8 hours PRN Nausea and/or Vomiting      Allergies    No Known Allergies    Intolerances        Review of Systems:    General:  No wt loss, fevers, chills, night sweats, fatigue   Eyes:  Good vision, no reported pain  ENT:  No sore throat, pain, runny nose, +dysphagia  CV:  No pain, palpitations, hypo/hypertension  Resp:  No dyspnea, cough, tachypnea, wheezing  GI:  No pain, No nausea, No vomiting, No diarrhea, No constipation, No weight loss, No fever, No pruritis, No rectal bleeding, No melena, No dysphagia  :  No pain, bleeding, incontinence, nocturia  Muscle:  No pain, weakness  Neuro:  No weakness, tingling, memory problems  Psych:  No fatigue, insomnia, mood problems, depression  Endocrine:  No polyuria, polydypsia, cold/heat intolerance  Heme:  No petechiae, ecchymosis, easy bruisability  Skin:  No rash, tattoos, scars, edema      Vital Signs Last 24 Hrs  T(C): 36.9 (30 Sep 2021 04:50), Max: 37.1 (29 Sep 2021 20:00)  T(F): 98.4 (30 Sep 2021 04:50), Max: 98.7 (29 Sep 2021 20:00)  HR: 102 (30 Sep 2021 10:21) (98 - 133)  BP: 144/79 (30 Sep 2021 04:50) (112/64 - 144/79)  BP(mean): --  RR: 18 (30 Sep 2021 04:50) (18 - 20)  SpO2: 96% (30 Sep 2021 10:21) (93% - 98%)    PHYSICAL EXAM:    Constitutional: NAD  HEENT: EOMI, throat clear  Neck: No LAD, supple  Respiratory: CTA and P  Cardiovascular: S1 and S2, RRR, no M  Gastrointestinal: BS+, soft, NT/ND, neg HSM,  Extremities: No peripheral edema, neg clubbing, cyanosis  Vascular: 2+ peripheral pulses  Neurological: A/O x 3, no focal deficits  Psychiatric: Normal mood, normal affect  Skin: No rashes      LABS:                        13.3   13.56 )-----------( 217      ( 30 Sep 2021 07:27 )             39.3     09-30    135  |  99  |  24<H>  ----------------------------<  160<H>  4.5   |  21<L>  |  0.99    Ca    9.8      30 Sep 2021 07:27  Phos  2.9     09-30  Mg     2.30     09-30      PTT - ( 30 Sep 2021 09:19 )  PTT:96.3 sec      RADIOLOGY & ADDITIONAL TESTS:

## 2021-09-30 NOTE — PROGRESS NOTE ADULT - SUBJECTIVE AND OBJECTIVE BOX
CC: F/U for PNA    Saw/spoke to patient. Improved, off oxygen. Asking to go home.    Allergies  No Known Allergies    ANTIMICROBIALS:  piperacillin/tazobactam IVPB.. 3.375 every 8 hours    PE:    Vital Signs Last 24 Hrs  T(C): 36.9 (30 Sep 2021 04:50), Max: 37.1 (29 Sep 2021 20:00)  T(F): 98.4 (30 Sep 2021 04:50), Max: 98.7 (29 Sep 2021 20:00)  HR: 102 (30 Sep 2021 10:21) (98 - 133)  BP: 144/79 (30 Sep 2021 04:50) (112/64 - 144/79)  RR: 18 (30 Sep 2021 04:50) (18 - 20)  SpO2: 96% (30 Sep 2021 10:21) (93% - 98%)    Gen: AOx3, NAD, non-toxic  CV: S1+S2 normal, tachycardic  Resp: Clear bilat, no resp distress, no crackles/wheezes, RA  Abd: Soft, nontender, +BS  Ext: No LE edema, no wounds    LABS:                        13.3   13.56 )-----------( 217      ( 30 Sep 2021 07:27 )             39.3     09-30    135  |  99  |  24<H>  ----------------------------<  160<H>  4.5   |  21<L>  |  0.99    Ca    9.8      30 Sep 2021 07:27  Phos  2.9     09-30  Mg     2.30     09-30    MICROBIOLOGY:    .Sputum Sputum  09-27-21   Normal Respiratory Stephanie present  --    No polymorphonuclear leukocytes per low power field  No Squamous epithelial cells per low power field  Few Gram positive cocci in pairs per oil power field  Few Gram Negative Rods per oil power field    .Throat Throat  09-27-21   No beta hemolytic streptococci or Arcanobacterium haemolyticum isolated.  (Throat special examined for beta hemolytic streptococci and  Arcanobacterium haemolyticum)      .Blood Blood-Peripheral  09-26-21   No growth to date.      .Blood Blood-Peripheral  09-26-21   No growth to date.     Clean Catch Clean Catch (Midstream)  09-26-21   >100,000 CFU/ml Klebsiella pneumoniae  --  Klebsiella pneumoniae    Rapid RVP Result: NotDetec (09-26 @ 11:03)    (otherwise reviewed)    RADIOLOGY:    9/29 XR:    FINDINGS:    Scattered areas of linear atelectasis. No focal consolidation. No pleural effusion or pneumothorax.  Cardiac size cannot be accurately assessed in this projection, however appears stable. No acute osseous abnormality.    IMPRESSION:  No focal consolidation

## 2021-09-30 NOTE — SBIRT NOTE ADULT - NSSBIRTALCPOSREINDET_GEN_A_CORE
Provided SBIRT services: Full screen Positive. Positive reinforcement provided given patient currently within healthy guidelines. Education materials reviewed.

## 2021-09-30 NOTE — SBIRT NOTE ADULT - NSSBIRTDRGNOACTINTDET_GEN_A_CORE
Provided SBIRT services: Full screen Negative. No additional needs in this domain.   [facility] information reviewed.

## 2021-09-30 NOTE — PROGRESS NOTE ADULT - ASSESSMENT
75 yo M with A Fib, asthma, back pain, presenting with fever  Fever, leukocytosis  CT with opacities suspicion for infection  No atypical exposures, no sick contacts  RVP negative  UA+, UCX K pne S zosyn  Treat for bacterial pna, improving with antibiotic?  Legionella negative  Overall,  1) Bacterial pneumonia  - Zenkers cause for aspiration?  - Zosyn 3.375g q 8 through 10/3/21 (consider PO Ceftin when DC planning)  - O2 supplementation per primary team  2) Leukocytosis  - Steroid reaction? Improving  - Trend CBCs  - Abx as above  3) Fever  - F/U pending BCX  4) Positive UCX  - Suspect asymp bacteruria/colonizer  - Monitor for urinary symptoms    Gee Mullen MD  Pager [phone]  From 5pm-9am, and on weekends call [phone]

## 2021-09-30 NOTE — SWALLOW VFSS/MBS ASSESSMENT ADULT - DIAGNOSTIC IMPRESSIONS
Patient presented with Mild Oral Dysphagia and Mild Pharyngeal Dysphagia with an incidental finding of an anatomical abnormality resulting in an Esophageal Dysphagia component.    Oral phase characterized by adequate acceptance, adequate anterior bolus containment and slow bolus manipulation.  There is repetitive lingual movement and reduced tongue to palate seal resulting in moderate premature spillage of honey thick liquids, nectar thick liquids and thin liquids to the hypopharynx.  There is delayed anterior to posterior transport, piecemeal transport occasionally resulting in two swallows and adequate oral cavity clearance post primary swallow.  Pharyngeal phase characterized by delayed pharyngeal swallow, initiating with the head of the bolus at the pyriforms.  There is adequate base of tongue retraction, adequate hyolaryngeal elevation and excursion, adequate pharyngeal contraction and adequate laryngeal vestibule closure.  There is adequate pharyngeal clearance post primary swallow.  HOWEVER, there is moderate to severe pharyngeal residue primarily located in the vallecula and pyriforms across PO trials secondary to severe backflow/retrograde from an incidental finding of an anatomical abnormality of a large cervical esophageal diverticulum/outpouching (as per radiologist).  Patient required cues to reswallow, which cleared pharyngeal residue.  Intermittently, bolus backflow/retrograde reached the level of the oral cavity, which patient recollected and reswallowed.  There is No Laryngeal Penetration or Aspiration before, during or after the swallow with puree, solids, honey thick liquids and nectar thick liquids.  There is Trace Laryngeal Penetration during the swallow with complete retrieval with thin liquids likely exacerbated by pyriform residue from the backflow; however patient able to maintain adequate airway protection.

## 2021-09-30 NOTE — SWALLOW VFSS/MBS ASSESSMENT ADULT - ADDITIONAL RECOMMENDATIONS
1. 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 Reconsult this service as indicated   3. Consideration for neuro consult given mild oropharyngeal dysphagia at MD's discretion 1. 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 Reconsult this service as indicated   3. Consideration for neuro consult given mild oropharyngeal dysphagia at MD's discretion  4. Consideration for nutrition consult given identified dysphagia

## 2021-09-30 NOTE — PROGRESS NOTE ADULT - ATTENDING COMMENTS
Noted.  
Patient seen and examined.  Agree with above.   TTE with normal LV function   HR have improved on oral cardizem  Continue with ac if no contraindications for cva prevention   Treatment of PNA per primary team     Brian Kaur MD
GI consult for endoscopic management of Zenker's. Plan for barium when pna resolves.

## 2021-09-30 NOTE — DISCHARGE NOTE PROVIDER - NSDCMRMEDTOKEN_GEN_ALL_CORE_FT
Ambien 10 mg oral tablet: 1 tab(s) orally once a day (at bedtime), As Needed insomnia   aspirin 81 mg oral tablet: 1 tab(s) orally once a day  Bystolic 10 mg oral tablet: 1 tab(s) orally once a day  Eliquis 5 mg oral tablet: 1 tab(s) orally 2 times a day     Price check only pls page [phone] w/price  finasteride 5 mg oral tablet: 1 tab(s) orally once a day  losartan-hydrochlorothiazide 50 mg-12.5 mg oral tablet: 0.5 tab(s) orally once a day  Symbicort 160 mcg-4.5 mcg/inh inhalation aerosol: 2 puff(s) inhaled 2 times a day  Vitamin D3 25 mcg (1000 intl units) oral tablet: 1 tab(s) orally once a day   Ambien 10 mg oral tablet: 1 tab(s) orally once a day (at bedtime), As Needed insomnia   aspirin 81 mg oral tablet: 1 tab(s) orally once a day  Bystolic 10 mg oral tablet: 1 tab(s) orally once a day  Eliquis 5 mg oral tablet: 1 tab(s) orally 2 times a day     Price check only pls   finasteride 5 mg oral tablet: 1 tab(s) orally once a day  losartan-hydrochlorothiazide 50 mg-12.5 mg oral tablet: 0.5 tab(s) orally once a day  Symbicort 160 mcg-4.5 mcg/inh inhalation aerosol: 2 puff(s) inhaled 2 times a day  Vitamin D3 25 mcg (1000 intl units) oral tablet: 1 tab(s) orally once a day   Ambien 10 mg oral tablet: 1 tab(s) orally once a day (at bedtime), As Needed insomnia   aspirin 81 mg oral tablet: 1 tab(s) orally once a day  Bystolic 10 mg oral tablet: 1 tab(s) orally once a day  finasteride 5 mg oral tablet: 1 tab(s) orally once a day  losartan-hydrochlorothiazide 50 mg-12.5 mg oral tablet: 0.5 tab(s) orally once a day  Symbicort 160 mcg-4.5 mcg/inh inhalation aerosol: 2 puff(s) inhaled 2 times a day  Vitamin D3 25 mcg (1000 intl units) oral tablet: 1 tab(s) orally once a day   Ambien 10 mg oral tablet: 1 tab(s) orally once a day (at bedtime), As Needed insomnia   aspirin 81 mg oral tablet: 1 tab(s) orally once a day  Bystolic 10 mg oral tablet: 1 tab(s) orally once a day  finasteride 5 mg oral tablet: 1 tab(s) orally once a day  losartan-hydrochlorothiazide 50 mg-12.5 mg oral tablet: 0.5 tab(s) orally once a day  rivaroxaban 20 mg oral tablet: 1 tab(s) orally once a day (at bedtime)   price check only pls  Symbicort 160 mcg-4.5 mcg/inh inhalation aerosol: 2 puff(s) inhaled 2 times a day  Vitamin D3 25 mcg (1000 intl units) oral tablet: 1 tab(s) orally once a day   Ambien 10 mg oral tablet: 1 tab(s) orally once a day (at bedtime), As Needed insomnia   aspirin 81 mg oral tablet: 1 tab(s) orally once a day  Bystolic 10 mg oral tablet: 1 tab(s) orally once a day  Eliquis 5 mg oral tablet: 1 tab(s) orally 2 times a day     PA Completed: Case Id:[id number]  finasteride 5 mg oral tablet: 1 tab(s) orally once a day  losartan-hydrochlorothiazide 50 mg-12.5 mg oral tablet: 0.5 tab(s) orally once a day  Symbicort 160 mcg-4.5 mcg/inh inhalation aerosol: 2 puff(s) inhaled 2 times a day  Vitamin D3 25 mcg (1000 intl units) oral tablet: 1 tab(s) orally once a day   Ambien 10 mg oral tablet: 1 tab(s) orally once a day (at bedtime), As Needed insomnia   amoxicillin-clavulanate 875 mg-125 mg oral tablet: 1 tab(s) orally 2 times a day  benzonatate 100 mg oral capsule: 1 cap(s) orally 3 times a day, As needed, Cough  Bystolic 10 mg oral tablet: 1 tab(s) orally once a day  dilTIAZem 240 mg/24 hours oral capsule, extended release: 1 cap(s) orally once a day  Eliquis 5 mg oral tablet: 1 tab(s) orally 2 times a day     PA Completed: Case Id:[id number]  please use eliquis coupon,   finasteride 5 mg oral tablet: 1 tab(s) orally once a day  losartan-hydrochlorothiazide 50 mg-12.5 mg oral tablet: 0.5 tab(s) orally once a day  montelukast 10 mg oral tablet: 1 tab(s) orally once a day  predniSONE 20 mg oral tablet: 1 tab(s) orally every 12 hours  Symbicort 160 mcg-4.5 mcg/inh inhalation aerosol: 2 puff(s) inhaled 2 times a day  Vitamin D3 25 mcg (1000 intl units) oral tablet: 1 tab(s) orally once a day   Ambien 10 mg oral tablet: 1 tab(s) orally once a day (at bedtime), As Needed insomnia   amoxicillin-clavulanate 875 mg-125 mg oral tablet: 1 tab(s) orally 2 times a day  benzonatate 100 mg oral capsule: 1 cap(s) orally 3 times a day, As needed, Cough  Bystolic 10 mg oral tablet: 1 tab(s) orally once a day  dilTIAZem 240 mg/24 hours oral capsule, extended release: 1 cap(s) orally once a day  Eliquis 5 mg oral tablet: 1 tab(s) orally 2 times a day     PA Completed: Case Id:[id number]  please use eliquis coupon,   finasteride 5 mg oral tablet: 1 tab(s) orally once a day  losartan-hydrochlorothiazide 50 mg-12.5 mg oral tablet: 0.5 tab(s) orally once a day  montelukast 10 mg oral tablet: 1 tab(s) orally once a day  montelukast 10 mg oral tablet: 1 tab(s) orally once a day  predniSONE 20 mg oral tablet: 1 tab(s) orally every 12 hours  Symbicort 160 mcg-4.5 mcg/inh inhalation aerosol: 2 puff(s) inhaled 2 times a day  Vitamin D3 25 mcg (1000 intl units) oral tablet: 1 tab(s) orally once a day

## 2021-09-30 NOTE — DISCHARGE NOTE PROVIDER - PROVIDER TOKENS
PROVIDER:[TOKEN:[[id number]],FOLLOWUP:[2 weeks],ESTABLISHEDPATIENT:[T]] PROVIDER:[TOKEN:[[id number]],SCHEDULEDAPPT:[10/11/2021],SCHEDULEDAPPTTIME:[04:45 PM],ESTABLISHEDPATIENT:[T]],PROVIDER:[TOKEN:[[id number]],FOLLOWUP:[1 week]],PROVIDER:[TOKEN:[[id number]],FOLLOWUP:[Routine]],PROVIDER:[TOKEN:[[id number]],FOLLOWUP:[Routine]]

## 2021-09-30 NOTE — PROGRESS NOTE ADULT - ASSESSMENT
76 year old male with fever, cough and dysphagia     Dysphagia   related to his Zenker's Diverticulum  Xray Esophagram ordered for tomorrow, 10/1  outpt f/u with [facility] Endoscopic evaluation; input appreciated    Dysphagia 3 diet per SLP; f/u Cine results     Pneumonia/ SOB   Abx duration per ID   further management per primary team     Afib   rate control   gi ppx with protonix daily on a/c   EP input appreciated     I reviewed the overnight course of events on the unit, re-confirming the patient history. I discussed the care with the patient and their family. The plan of care was discussed with the physician assistant and modifications were made to the notation where appropriate. Differential diagnosis and plan of care discussed with patient after the evaluation. Advanced care planning was discussed with patient and family.  Advanced care planning forms were reviewed and discussed.  Risks, benefits and alternatives of gastroenterologic procedures were discussed in detail and all questions were answered. 35 minutes spent on total encounter of which more than fifty percent of the encounter was spent counseling and/or coordinating care by the attending physician.      76 year old male with fever, cough and dysphagia     Dysphagia   related to his Zenker's Diverticulum  Defer Esophagram as d/w advanced GI as Cine results sufficient enough   outpt f/u with [facility] Endoscopic evaluation; input appreciated    Dysphagia 3 diet per SLP; f/u Cine results     Pneumonia/ SOB   Abx duration per ID   further management per primary team     Afib   rate control   gi ppx with protonix daily on a/c   EP input appreciated     I reviewed the overnight course of events on the unit, re-confirming the patient history. I discussed the care with the patient and their family. The plan of care was discussed with the physician assistant and modifications were made to the notation where appropriate. Differential diagnosis and plan of care discussed with patient after the evaluation. Advanced care planning was discussed with patient and family.  Advanced care planning forms were reviewed and discussed.  Risks, benefits and alternatives of gastroenterologic procedures were discussed in detail and all questions were answered. 35 minutes spent on total encounter of which more than fifty percent of the encounter was spent counseling and/or coordinating care by the attending physician.

## 2021-09-30 NOTE — PROGRESS NOTE ADULT - ASSESSMENT
75yo M w/ PMH HTN, A-fib, asthma, BPH, and chronic back pain (herniated discs, spinal stenosis) presents with cough x 1 now, worsening over the past few days now with fever (Tmax 103 at home), SOB, sputum production, and sore throat. In ED, was febrile to 104 F and tachy to 140s with leukocytosis of 13 and elevated lactate to 2.5 diagnosed with sepsis secondary to pneumonia now with A Fib with RVR

## 2021-09-30 NOTE — DISCHARGE NOTE PROVIDER - NSDCCPCAREPLAN_GEN_ALL_CORE_FT
PRINCIPAL DISCHARGE DIAGNOSIS  Diagnosis: Pneumonia  Assessment and Plan of Treatment: You were treated for pneumonia with IV antibiotics.  You will continue oral antibiotics at home.  Complete your entire course of antibiotics even if you feel better. Follow up with your primary care provider within 2 weeks of discharge.  Call your primary care provider if you have a fever.      SECONDARY DISCHARGE DIAGNOSES  Diagnosis: Atrial fibrillation with rapid ventricular response  Assessment and Plan of Treatment: You have atrial fibrillation.  Continue to take your Cardizem (Diltiazem) as ordered, this medication will help control your heart rate.  Follow up with your cardiologist within 2 weeks of discharge.  If you experience palpitations or shortness of breath call your cardiologist or proceed to the emergency room.  You were started on a blood thinner called Eliquis (Apixiban).  This medication will help protect you against stroke.  Report any unusual bruising or bleeding to your doctor.    Diagnosis: Zenker diverticulum  Assessment and Plan of Treatment: Follow up with advanced GI as an outpatient for further evaluation & treatment.    Diagnosis: BPH (benign prostatic hyperplasia)  Assessment and Plan of Treatment: Continue to take your finasteride as ordered.    Diagnosis: HTN (hypertension)  Assessment and Plan of Treatment: Continue current blood pressure medication regimen as directed. Monitor for any visual changes, headaches or dizziness.  Monitor blood pressure regularly.  Follow up with your PCP for further management for high blood pressure, please call to make appointment within 1 week of discharge     PRINCIPAL DISCHARGE DIAGNOSIS  Diagnosis: Pneumonia  Assessment and Plan of Treatment: You were treated for pneumonia with IV antibiotics.  You will continue oral antibiotics at home.  Complete your entire course of antibiotics even if you feel better. Follow up with your primary care provider within 1 weeks of discharge.  Call your primary care provider if you have a fever.  You have an appointment scheduled 10/11/21 @ 445pm  You will take oral antibiotics for a total of 2 days, complete your course of steroids for your asthma.      SECONDARY DISCHARGE DIAGNOSES  Diagnosis: Atrial fibrillation with rapid ventricular response  Assessment and Plan of Treatment: You have atrial fibrillation.  Continue to take your Cardizem (Diltiazem) as ordered, this medication will help control your heart rate.  Follow up with your cardiologist within 2 weeks of discharge.  If you experience palpitations or shortness of breath call your cardiologist or proceed to the emergency room.  You were started on a blood thinner called Eliquis (Apixiban).  This medication will help protect you against stroke.  Report any unusual bruising or bleeding to your doctor.  DO NOT Crush, chew or break open the cardizem tablets, if you are unable to tolerate call your primary care provider or cardiologist.    Diagnosis: Zenker diverticulum  Assessment and Plan of Treatment: Follow up with advanced GI as an outpatient for further evaluation & treatment. There are 2 advanced GI physicians Dr. Kaplan & Dr. Loya, there contact information has been included.    Diagnosis: BPH (benign prostatic hyperplasia)  Assessment and Plan of Treatment: Continue to take your finasteride as ordered.    Diagnosis: HTN (hypertension)  Assessment and Plan of Treatment: Continue current blood pressure medication regimen as directed. Monitor for any visual changes, headaches or dizziness.  Monitor blood pressure regularly.  Follow up with your PCP for further management for high blood pressure, please call to make appointment within 1 week of discharge

## 2021-09-30 NOTE — SWALLOW VFSS/MBS ASSESSMENT ADULT - H & P REVIEW
[Initial Evaluation] : an initial evaluation for [Tinnitus] : tinnitus As per Internal Medicine Note: 77yo M w/ PMH HTN, A-fib, asthma, BPH, and chronic back pain (herniated discs, spinal stenosis) presents with cough x 1 now, worsening over the past few days now with fever (Tmax 103 at home), SOB, sputum production, and sore throat. In ED, was febrile to 104 F and tachy to 140s with leukocytosis of 13 and elevated lactate to 2.5 Now s/p 2L NS bolus, lactate downtrended to 1.5. CXR showed b/l lower lung opacities c/f PNA. CTA chest showed b/l patchy, ground glass opacities that may represent infection, as well as, an outpouching of the esophagus near thoracic inlet suspicious for Zenker's diverticulum. No PE. Of note pt, also does self-catheterizations at home, without any urinary complaints. UA showing 187 WBCs, large LE, neg nitrite. Admitted to medicine for likely PNA, pending other infectious workup./yes

## 2021-09-30 NOTE — PROGRESS NOTE ADULT - SUBJECTIVE AND OBJECTIVE BOX
Date of Service: 09-30-21 @ 13:47    Patient is a 76y old  Male who presents with a chief complaint of PNA (30 Sep 2021 11:42)      Any change in ROS: doing much better: on room air now:     MEDICATIONS  (STANDING):  aspirin enteric coated 81 milliGRAM(s) Oral daily  budesonide 160 MICROgram(s)/formoterol 4.5 MICROgram(s) Inhaler 2 Puff(s) Inhalation two times a day  budesonide 160 MICROgram(s)/formoterol 4.5 MICROgram(s) Inhaler 2 Puff(s) Inhalation two times a day  diltiazem    Tablet 60 milliGRAM(s) Oral every 6 hours  finasteride 5 milliGRAM(s) Oral daily  heparin  Infusion.  Unit(s)/Hr (14 mL/Hr) IV Continuous <Continuous>  ipratropium    for Nebulization 500 MICROGram(s) Nebulizer every 6 hours  levalbuterol Inhalation 0.63 milliGRAM(s) Inhalation every 6 hours  montelukast 10 milliGRAM(s) Oral daily  nebivolol 10 milliGRAM(s) Oral daily  piperacillin/tazobactam IVPB.. 3.375 Gram(s) IV Intermittent every 8 hours  predniSONE   Tablet 20 milliGRAM(s) Oral every 12 hours  zolpidem 10 milliGRAM(s) Oral at bedtime    MEDICATIONS  (PRN):  acetaminophen   Tablet .. 650 milliGRAM(s) Oral every 6 hours PRN Temp greater or equal to 38.5C (101.3F), Mild Pain (1 - 3)  aluminum hydroxide/magnesium hydroxide/simethicone Suspension 30 milliLiter(s) Oral every 4 hours PRN Dyspepsia  benzonatate 100 milliGRAM(s) Oral three times a day PRN Cough  guaiFENesin Oral Liquid (Sugar-Free) 100 milliGRAM(s) Oral every 6 hours PRN Cough  heparin   Injectable 6500 Unit(s) IV Push every 6 hours PRN For aPTT less than 40  heparin   Injectable 3000 Unit(s) IV Push every 6 hours PRN For aPTT between 40 - 57  melatonin 3 milliGRAM(s) Oral at bedtime PRN Insomnia  ondansetron Injectable 4 milliGRAM(s) IV Push every 8 hours PRN Nausea and/or Vomiting    Vital Signs Last 24 Hrs  T(C): 36.8 (30 Sep 2021 13:22), Max: 37.1 (29 Sep 2021 20:00)  T(F): 98.3 (30 Sep 2021 13:22), Max: 98.7 (29 Sep 2021 20:00)  HR: 101 (30 Sep 2021 13:22) (98 - 133)  BP: 117/67 (30 Sep 2021 13:22) (112/64 - 144/79)  BP(mean): --  RR: 18 (30 Sep 2021 13:22) (18 - 20)  SpO2: 97% (30 Sep 2021 13:22) (93% - 98%)    I&O's Summary        Physical Exam:   GENERAL: NAD, well-groomed, well-developed  HEENT: AZUCENA/   Atraumatic, Normocephalic  ENMT: No tonsillar erythema, exudates, or enlargement; Moist mucous membranes, Good dentition, No lesions  NECK: Supple, No JVD, Normal thyroid  CHEST/LUNG: Clear to auscultaion-no wheezing  CVS: Regular rate and rhythm; No murmurs, rubs, or gallops  GI: : Soft, Nontender, Nondistended; Bowel sounds present  NERVOUS SYSTEM:  Alert & Oriented X3  EXTREMITIES:  2+ Peripheral Pulses, No clubbing, cyanosis, or edema  LYMPH: No lymphadenopathy noted  SKIN: No rashes or lesions  ENDOCRINOLOGY: No Thyromegaly  PSYCH: Appropriate    Labs:  26, 22, 24                            13.3   13.56 )-----------( 217      ( 30 Sep 2021 07:27 )             39.3                         12.9   17.75 )-----------( 207      ( 29 Sep 2021 15:59 )             38.1                         12.8   18.45 )-----------( 224      ( 29 Sep 2021 07:31 )             38.2                         12.8   19.08 )-----------( 184      ( 28 Sep 2021 07:34 )             37.8                         12.6   16.24 )-----------( 156      ( 27 Sep 2021 07:58 )             37.3     09-30    135  |  99  |  24<H>  ----------------------------<  160<H>  4.5   |  21<L>  |  0.99  09-29    138  |  99  |  22  ----------------------------<  154<H>  5.6<H>   |  13<L>  |  0.90  09-28    139  |  101  |  15  ----------------------------<  157<H>  3.7   |  23  |  0.86  09-27    136  |  100  |  15  ----------------------------<  115<H>  3.3<L>   |  21<L>  |  0.93    Ca    9.8      30 Sep 2021 07:27  Ca    9.9      29 Sep 2021 07:31  Phos  2.9     09-30  Phos  3.5     09-29  Mg     2.30     09-30  Mg     2.30     09-29    TPro  7.4  /  Alb  4.5  /  TBili  1.4<H>  /  DBili  x   /  AST  27  /  ALT  26  /  AlkPhos  57  09-27    CAPILLARY BLOOD GLUCOSE            PTT - ( 30 Sep 2021 09:19 )  PTT:96.3 sec    Serum Pro-Brain Natriuretic Peptide: 1509 pg/mL (09-29 @ 07:43)        RECENT CULTURES:  09-27 @ 18:28 .Sputum Sputum       No polymorphonuclear leukocytes per low power field  No Squamous epithelial cells per low power field  Few Gram positive cocci in pairs per oil power field  Few Gram Negative Rods per oil power field           Normal Respiratory Stephanie present    09-27 @ 07:25 .Throat Throat                No beta hemolytic streptococci or Arcanobacterium haemolyticum isolated.  (Throat special examined for beta hemolytic streptococci and  Arcanobacterium haemolyticum)    09-26 @ 18:39 .Blood Blood-Peripheral                No growth to date.    09-26 @ 18:36 .Blood Blood-Peripheral     r< from: Xray Chest 1 View- PORTABLE-Urgent (Xray Chest 1 View- PORTABLE-Urgent .) (09.29.21 @ 12:25) >  EXAM:  XR CHEST PORTABLE URGENT 1V        PROCEDURE DATE:  Sep 29 2021         INTERPRETATION:  CLINICAL INDICATION: Shortness of breath    TECHNIQUE: Single view of the chest was obtained.    COMPARISON: Chest radiograph 9/26/2021.    FINDINGS:    Scattered areas of linear atelectasis. No focal consolidation. No pleural effusion or pneumothorax.  Cardiac size cannot be accurately assessed in this projection, however appears stable. No acute osseous abnormality.    IMPRESSION:  No focal consolidation    --- End of Report ---            NADIA NICHOLS MD; Resident Radiology  This document has been electronically signed.    < end of copied text >  < from: CT Angio Chest PE Protocol w/ IV Cont (09.26.21 @ 14:59) >  Complications: None reported at time of study completion    PROCEDURE:  CT Angiography of the Chest.  Sagittal and coronal reformats were performed as well as 3D (MIP) reconstructions.    FINDINGS:    LUNGS AND AIRWAYS: Patent central airways.  Bilateral patchy and groundglass opacities.  PLEURA: Pleura is normal.  MEDIASTINUMAND RANDALL: The chest lymph nodes measure less than 10 mm in the short axis. The thyroid gland is normal. Arising from the aorta at the level of the thoracic inlet is a 3.4 x 1.5 cm outpouching that extends posterior to the left of midline and containsfluid and air.  VESSELS: No, central, lobar, segmental, or subsegmental pulmonary embolism. Aorta is normal in caliber. Aortic calcification.  HEART: Heart size is normal. No pericardial effusion. Coronary artery calcifications.  CHEST WALL AND LOWERNECK: Within normal limits.  VISUALIZED UPPER ABDOMEN: Hepatic steatosis.  BONES: Degenerative changes.    IMPRESSION:    1.  No pulmonary embolism.  2.  Bilateral patchy and groundglass opacities can occur in the setting of a variety of clinical settings including infection.  3.  The outpouching arising from the esophagus at thoracic inlet is suspicious for a Zenker's diverticulum.        --- End of Report ---            AVINASH CURRY MD; Resident Radiology  This document has been electronicallysigned.  WYATT SCOTT MD; Attending Radiologist  This document has been electronically signed. Sep 26 2021  3:30PM    < end of copied text >             No growth to date.    09-26 @ 10:37 Clean Catch Clean Catch (Midstream)   SEFERINO      Klebsiella pneumoniae  Klebsiella pneumoniae     >100,000 CFU/ml Klebsiella pneumoniae          RESPIRATORY CULTURES:          Studies  Chest X-RAY  CT SCAN Chest   Venous Dopplers: LE:   CT Abdomen  Others

## 2021-09-30 NOTE — SWALLOW VFSS/MBS ASSESSMENT ADULT - RECOMMENDED CONSISTENCY
CONTINUED IMPRESSIONS: Of note, there was an incidental finding of a large cervical esophageal diverticulum/outpouching, posterior and to the left of the esophagus as per radiologist, see radiologist report for further detail.  Large Diverticulum resulted in significant backflow/retrograde of bolus to the oral and hypopharynx cavities, placing patient at an Increased Risk for Aspiration/Choking.       RECOMMENDATIONS:   1. From an Oropharyngeal perspective, patient cleared for regular solids and thin liquids, would consult GI on diet given anatomical findings  2. Aspiration precautions and Reflux precautions   3. Safe Swallow Strategies: Upright position during PO intake and for 60minutes following, swallow 2-3 times for each bite and sip

## 2021-09-30 NOTE — PROGRESS NOTE ADULT - SUBJECTIVE AND OBJECTIVE BOX
Date of service 09/30/2021    chief complaint: SOB    extended hpi: 75yo M w/ PMH HTN, A-fib (on ASA only), asthma, BPH, and chronic back pain (herniated discs, spinal stenosis) presents with worsening cough a/w fever and SOB.    SOB improved today, no CP PAlps or SOB    Review of Systems:   Constitutional: [ ] fevers, [ ] chills.   Skin: [ ] dry skin. [ ] rashes.  Psychiatric: [ ] depression, [ ] anxiety.   Gastrointestinal: [ ] BRBPR, [ ] melena.   Neurological: [ ] confusion. [ ] seizures. [ ] shuffling gait.   Ears,Nose,Mouth and Throat: [ ] ear pain [ ] sore throat.   Eyes: [ ] diplopia.   Respiratory: [ ] hemoptysis. [ ] shortness of breath  Cardiovascular: See HPI above  Hematologic/Lymphatic: [ ] anemia. [ ] painful nodes. [ ] prolonged bleeding.   Genitourinary: [ ] hematuria. [ ] flank pain.   Endocrine: [ ] significant change in weight. [ ] intolerance to heat and cold.     Review of systems [x ] otherwise negative, [ ] otherwise unable to obtain    FH: no family history of sudden cardiac death in first degree relatives    SH: [ ] tobacco, [ ] alcohol, [ ] drugs    acetaminophen   Tablet .. 650 milliGRAM(s) Oral every 6 hours PRN  aluminum hydroxide/magnesium hydroxide/simethicone Suspension 30 milliLiter(s) Oral every 4 hours PRN  aspirin enteric coated 81 milliGRAM(s) Oral daily  benzonatate 100 milliGRAM(s) Oral three times a day PRN  budesonide 160 MICROgram(s)/formoterol 4.5 MICROgram(s) Inhaler 2 Puff(s) Inhalation two times a day  budesonide 160 MICROgram(s)/formoterol 4.5 MICROgram(s) Inhaler 2 Puff(s) Inhalation two times a day  diltiazem    Tablet 60 milliGRAM(s) Oral every 6 hours  finasteride 5 milliGRAM(s) Oral daily  guaiFENesin Oral Liquid (Sugar-Free) 100 milliGRAM(s) Oral every 6 hours PRN  heparin   Injectable 6500 Unit(s) IV Push every 6 hours PRN  heparin   Injectable 3000 Unit(s) IV Push every 6 hours PRN  heparin  Infusion.  Unit(s)/Hr IV Continuous <Continuous>  ipratropium    for Nebulization 500 MICROGram(s) Nebulizer every 6 hours  levalbuterol Inhalation 0.63 milliGRAM(s) Inhalation every 6 hours  melatonin 3 milliGRAM(s) Oral at bedtime PRN  montelukast 10 milliGRAM(s) Oral daily  nebivolol 10 milliGRAM(s) Oral daily  ondansetron Injectable 4 milliGRAM(s) IV Push every 8 hours PRN  piperacillin/tazobactam IVPB.. 3.375 Gram(s) IV Intermittent every 8 hours  predniSONE   Tablet 20 milliGRAM(s) Oral every 12 hours  zolpidem 10 milliGRAM(s) Oral at bedtime                            13.3   13.56 )-----------( 217      ( 30 Sep 2021 07:27 )             39.3     135  |  99  |  24<H>  ----------------------------<  160<H>  4.5   |  21<L>  |  0.99    Ca    9.8      30 Sep 2021 07:27  Phos  2.9     09-30  Mg     2.30     09-30    T(C): 36.9 (09-30-21 @ 04:50), Max: 37.1 (09-29-21 @ 20:00)  HR: 102 (09-30-21 @ 10:21) (98 - 133)  BP: 144/79 (09-30-21 @ 04:50) (112/64 - 144/79)  RR: 18 (09-30-21 @ 04:50) (18 - 20)  SpO2: 96% (09-30-21 @ 10:21) (93% - 98%)    General: Well nourished in no acute distress. Alert and Oriented * 3.   Head: Normocephalic and atraumatic.   Neck: No JVD. No bruits. Supple. Does not appear to be enlarged.   Cardiovascular: + S1,S2 ; RRR Soft systolic murmur at the left lower sternal border. No rubs noted.    Lungs: CTA b/l. No rhonchi, rales or wheezes.   Abdomen: + BS, soft. Non tender. Non distended. No rebound. No guarding.   Extremities: No clubbing/cyanosis/edema.   Neurologic: Moves all four extremities. Full range of motion.   Skin: Warm and moist. The patient's skin has normal elasticity and good skin turgor.   Psychiatric: Appropriate mood and affect.  Musculoskeletal: Normal range of motion, normal strength    DATA    tele- AF 90s    < from: Transthoracic Echocardiogram (09.29.21 @ 16:11) >  CONCLUSIONS:  1. Mitral annular calcification, otherwise normal mitral  valve. Minimal mitral regurgitation.  2. Calcified trileaflet aortic valve with normal opening.  Mild aortic regurgitation.  3. Normal left ventricular systolic function. No segmental  wall motion abnormalities.  4. Normal right ventricular size and function.  ------------------------------------------------------------------------  Confirmed on  9/29/2021 - 18:10:30 by LISSETH Howell    < end of copied text >    ASSESSMENT/PLAN: 	75yo M w/ PMH HTN, A-fib (on ASA only, OP Cardio Dr Torres), HTN, asthma, BPH, and chronic back pain (herniated discs, spinal stenosis) presents with worsening cough a/w fever and SOB, found with presumed aspiration PNA    --Cardiology consulted for Rapid AFib and SOB  --Pt not on any AVN at home  --start Cardizem 30 Q6  --hold on increased BBlockers given bronchospasm  --EP consult DR Burgess appreciated  --[id number]=9, recommend lifelong AC.  D/w patient and wife in detail  --IV abx per medicine  --TTE noted with structurally normal heart

## 2021-09-30 NOTE — SWALLOW VFSS/MBS ASSESSMENT ADULT - ESOPHAGEAL STAGE
Incidental finding of a large cervical esophageal diverticulum/outpouching, posterior and to the left of the esophagus as per radiologist, see radiologist report for further detail.  Large Diverticulum resulted in significant backflow/retrograde of bolus to the oral and hypopharynx cavities, placing patient at an Increased Risk for Aspiration/Choking.

## 2021-09-30 NOTE — SWALLOW VFSS/MBS ASSESSMENT ADULT - PHARYNGEAL PHASE COMMENTS
There is adequate base of tongue retraction, adequate hyolaryngeal elevation and excursion, adequate pharyngeal contraction and adequate laryngeal vestibule closure.

## 2021-09-30 NOTE — SWALLOW VFSS/MBS ASSESSMENT ADULT - COMMENTS
As per GI Note: #Hypoxic respiratory failure due to multilobular pneumonia - Suspect aspiration in setting of Zenker's diverticulum  #Esophageal diverticulum -  Incidental finding at esophageal inlet on CT chest; associated with regurgitation, chronic cough and likely microaspiration, no halitosis or dysphagia.  #Sepsis due to multilobar aspiration pneumonia - On IV antibiotics and supplemental oxygen  # A fib not on anticoagulation  #Asthma  Plan:  1. Patient electing for outpatient follow up with [facility] to discuss possible endoscopic repair of Zenker's diverticulum by Dr. Gregor Kaplan  2.  can obtain X-ray esophagram for further imaging of Zenker's diverticulum when PNA resolved  3. dietary recommendations as per SLP evaluation  4. Please notify [facility] team when X-ray esophagram has been completed if patient remains hospitalized.    Chest CT: 1. No pulmonary embolism.  2. Bilateral patchy and groundglass opacities can occur in the setting of a variety of clinical settings including infection.  3. The outpouching arising from the esophagus at thoracic inlet is suspicious for a Zenker's diverticulum.    Patient seen for clinical swallow evaluation on 9/29/21, please refer to report for full details.    SLP received patient in radiology awake, alert, able to follow directions, answer questions and engage in conversation. Patient denies pain.  Patient able to stand for evaluation.

## 2021-09-30 NOTE — PROGRESS NOTE ADULT - SUBJECTIVE AND OBJECTIVE BOX
EP Attending    HISTORY OF PRESENT ILLNESS: HPI:  77yo M w/ PMH HTN, A-fib (on ASA only), asthma, BPH, and chronic back pain (herniated discs, spinal stenosis) presents with worsening cough a/w fever and SOB. Pt states that he has been coughing for over a month now with a sore throat, worsening over the past 3-4 days with increasing sputum production and difficulty breathing. Spiked a 103 F fever today. Patient has a history of asthma and is supposed to use his symbicort 2x a day that he's not very compliant with. But since he's been coughing, he's been using it twice a day. Patient reports chest tightness and HA during coughing fits. No n/v/d, abdominal pain, CP, or recent travel. Has been hospitalized twice in the past for pneumonia (5 years ago, in setting of post-op). Never been hospitalized for asthma or required intubation.   He also has a sore throat that started after he started coughing. Denies difficulty swallowing but has noticed intermittent coughing while eating over the past 6 months. Denies foul breath/odor.   Of note, patient has a h/o BPH and had a TURP 10 yrs ago c/b ?nerve injury- so he has been doing intermittent self-catheterizations for the past 10 yrs, max 2x a day, less if he's home and can conveniently use the bathroom. Have not noticed any worsening urinary frequency or other urinary sxs. Denies dysuria, urinary urgency, hematuria.     In the ED, given 2L IV NS bolus, zosyn/vanc x 1, and Tylenol.    (26 Sep 2021 23:29)    Seen in [facility] on 9/29.  No shortness of breath, palpitations or angina with HR ~150bpm.    No history of lightheadedness or fainting.  Has had multiple prior aspiration pneumonia events.  Is not aware of diagnosis of AFib.  His wife states that he IS or SHOULD BE aware, as this has come up during prior surgical visits for his knee replacements, and that he had to go into the cardiac icu for stabilization before elective surgery.  He does not take an anticoagulant.  Has HTN, is 77yo, no hx MI, stroke, blood clot or CHF.  He sees Dr Gee Burgess in [place] for general cardiology.  Date of Service 9/30- resting comfortably. HR between  in AFib. no palpitations.  hoarse after eating.  witnessed images from his swallow study early, now aware of how his Zenker diverticulum feels like.      acetaminophen   Tablet .. 650 milliGRAM(s) Oral every 6 hours PRN  aluminum hydroxide/magnesium hydroxide/simethicone Suspension 30 milliLiter(s) Oral every 4 hours PRN  aspirin enteric coated 81 milliGRAM(s) Oral daily  benzonatate 100 milliGRAM(s) Oral three times a day PRN  budesonide 160 MICROgram(s)/formoterol 4.5 MICROgram(s) Inhaler 2 Puff(s) Inhalation two times a day  budesonide 160 MICROgram(s)/formoterol 4.5 MICROgram(s) Inhaler 2 Puff(s) Inhalation two times a day  diltiazem    Tablet 60 milliGRAM(s) Oral every 6 hours  finasteride 5 milliGRAM(s) Oral daily  guaiFENesin Oral Liquid (Sugar-Free) 100 milliGRAM(s) Oral every 6 hours PRN  heparin   Injectable 6500 Unit(s) IV Push every 6 hours PRN  heparin   Injectable 3000 Unit(s) IV Push every 6 hours PRN  heparin  Infusion.  Unit(s)/Hr IV Continuous <Continuous>  ipratropium    for Nebulization 500 MICROGram(s) Nebulizer every 6 hours  levalbuterol Inhalation 0.63 milliGRAM(s) Inhalation every 6 hours  melatonin 3 milliGRAM(s) Oral at bedtime PRN  montelukast 10 milliGRAM(s) Oral daily  nebivolol 10 milliGRAM(s) Oral daily  ondansetron Injectable 4 milliGRAM(s) IV Push every 8 hours PRN  piperacillin/tazobactam IVPB.. 3.375 Gram(s) IV Intermittent every 8 hours  predniSONE   Tablet 20 milliGRAM(s) Oral every 12 hours  zolpidem 10 milliGRAM(s) Oral at bedtime                          13.3   13.56 )-----------( 217      ( 30 Sep 2021 07:27 )             39.3       09-30    135  |  99  |  24<H>  ----------------------------<  160<H>  4.5   |  21<L>  |  0.99    Ca    9.8      30 Sep 2021 07:27  Phos  2.9     09-30  Mg     2.30     09-30    T(C): 36.8 (09-30-21 @ 13:22), Max: 37.1 (09-29-21 @ 20:00)  HR: 101 (09-30-21 @ 13:22) (98 - 133)  BP: 117/67 (09-30-21 @ 13:22) (112/64 - 144/79)  RR: 18 (09-30-21 @ 13:22) (18 - 20)  SpO2: 97% (09-30-21 @ 13:22) (93% - 98%)  Wt(kg): --    I&O's Summary      General: Well nourished, no acute distress, alert and oriented x 3  Head: normocephalic, no trauma  Neck: no JVD, no bruit, supple, not enlarged  CV: rapid irregular S1S2, no S3, regular rate, no murmurs.    Lungs: clear BL, no rales or wheezes  Abdomen: bowel sounds +, soft, nontender, nondistended  Extremities: no clubbing, cyanosis or edema  Neuro: Moves all 4 extremities, sensation intact x 4 extremities  Skin: warm and moist, normal turgor  Psych: Mood and affect are appropriate for circumstances  MSK: normal range of motion and strength x4 extremities.    TELEMETRY: AF/RVR	    ECG: AF, narrow QRS	    ASSESSMENT/PLAN: 	76y male with atrial fibrillation, unclear if persistent, in the setting of pneumonia.    Multiple prior documented AF events, unclear if this is paroxysmal or persistent.  He is under-covered for stroke prevention.  Heparin infusion for now.  Discharge on oral anticoagulation for [id number] of 3.  Rate control with oral diltiazem, 60mg, q6hrs.  HR incrementally  coming down after each dose.  Echo is reassuring, with normal LV EF.  Will follow.      Herminio Burgess M.D.  Cardiac Electrophysiology    office [phone]  pager [phone]

## 2021-09-30 NOTE — PROGRESS NOTE ADULT - SUBJECTIVE AND OBJECTIVE BOX
Patient is a 76y old  Male who presents with a chief complaint of PNA (29 Sep 2021 15:28)      DATE OF SERVICE: 09-30-21 @ 11:02    SUBJECTIVE / OVERNIGHT EVENTS: overnight events noted  appears much improved     ROS:  Resp: No cough no sputum production  CVS: No chest pain no palpitations no orthopnea  GI: no N/V/D  : no dysuria, no hematuria  Neuro: no weakness no paresthesias  Heme: No petechiae no easy bruising  Msk: No joint pain no swelling  Skin: No rash no itching        MEDICATIONS  (STANDING):  aspirin enteric coated 81 milliGRAM(s) Oral daily  budesonide 160 MICROgram(s)/formoterol 4.5 MICROgram(s) Inhaler 2 Puff(s) Inhalation two times a day  budesonide 160 MICROgram(s)/formoterol 4.5 MICROgram(s) Inhaler 2 Puff(s) Inhalation two times a day  diltiazem    Tablet 60 milliGRAM(s) Oral every 6 hours  finasteride 5 milliGRAM(s) Oral daily  heparin  Infusion.  Unit(s)/Hr (14 mL/Hr) IV Continuous <Continuous>  ipratropium    for Nebulization 500 MICROGram(s) Nebulizer every 6 hours  levalbuterol Inhalation 0.63 milliGRAM(s) Inhalation every 6 hours  montelukast 10 milliGRAM(s) Oral daily  nebivolol 10 milliGRAM(s) Oral daily  piperacillin/tazobactam IVPB.. 3.375 Gram(s) IV Intermittent every 8 hours  predniSONE   Tablet 20 milliGRAM(s) Oral every 12 hours  zolpidem 10 milliGRAM(s) Oral at bedtime    MEDICATIONS  (PRN):  acetaminophen   Tablet .. 650 milliGRAM(s) Oral every 6 hours PRN Temp greater or equal to 38.5C (101.3F), Mild Pain (1 - 3)  aluminum hydroxide/magnesium hydroxide/simethicone Suspension 30 milliLiter(s) Oral every 4 hours PRN Dyspepsia  benzonatate 100 milliGRAM(s) Oral three times a day PRN Cough  guaiFENesin Oral Liquid (Sugar-Free) 100 milliGRAM(s) Oral every 6 hours PRN Cough  heparin   Injectable 6500 Unit(s) IV Push every 6 hours PRN For aPTT less than 40  heparin   Injectable 3000 Unit(s) IV Push every 6 hours PRN For aPTT between 40 - 57  melatonin 3 milliGRAM(s) Oral at bedtime PRN Insomnia  ondansetron Injectable 4 milliGRAM(s) IV Push every 8 hours PRN Nausea and/or Vomiting        CAPILLARY BLOOD GLUCOSE        I&O's Summary      Vital Signs Last 24 Hrs  T(C): 36.9 (30 Sep 2021 04:50), Max: 37.1 (29 Sep 2021 20:00)  T(F): 98.4 (30 Sep 2021 04:50), Max: 98.7 (29 Sep 2021 20:00)  HR: 102 (30 Sep 2021 10:21) (98 - 133)  BP: 144/79 (30 Sep 2021 04:50) (112/64 - 144/79)  BP(mean): --  RR: 18 (30 Sep 2021 04:50) (18 - 20)  SpO2: 96% (30 Sep 2021 10:21) (93% - 98%)    PHYSICAL EXAM:  EYES: EOMI, PERRLA,  NECK: Supple, No JVD  CHEST/LUNG: clear   HEART: S1 S2; systolic murmur +   ABDOMEN: Soft, Nontender  EXTREMITIES:  no edema  NEUROLOGY: AO x 3 non-focal      LABS:                        13.3   13.56 )-----------( 217      ( 30 Sep 2021 07:27 )             39.3     09-30    135  |  99  |  24<H>  ----------------------------<  160<H>  4.5   |  21<L>  |  0.99    Ca    9.8      30 Sep 2021 07:27  Phos  2.9     09-30  Mg     2.30     09-30      PTT - ( 30 Sep 2021 09:19 )  PTT:96.3 sec            All consultant(s) notes reviewed and care discussed with other providers        Contact Number, Dr Heard [phone]

## 2021-09-30 NOTE — PROGRESS NOTE ADULT - PROBLEM SELECTOR PLAN 8
he has asthma and wheezing with poor air entry : cont steroids IV for one more day as well as BD and Symbicort: pt has been strongly advised to stop smoking and drinking!    9/29: cont BD and steroids: can change to xopenex if her heart rate is not controlled    9/30:  doing ok : no wheezing: cont bd as well as few more days of steroids

## 2021-09-30 NOTE — PROGRESS NOTE ADULT - NSPROGADDITIONALINFOA_GEN_ALL_CORE
discussed with patient in detail, expresses understanding of treatment plans.  discussed with wife over the phone in detail

## 2021-10-01 ENCOUNTER — TRANSCRIPTION ENCOUNTER (OUTPATIENT)
Age: 76
End: 2021-10-01

## 2021-10-01 VITALS
HEART RATE: 78 BPM | RESPIRATION RATE: 19 BRPM | SYSTOLIC BLOOD PRESSURE: 130 MMHG | DIASTOLIC BLOOD PRESSURE: 87 MMHG | OXYGEN SATURATION: 95 % | TEMPERATURE: 98 F

## 2021-10-01 LAB
CULTURE RESULTS: SIGNIFICANT CHANGE UP
CULTURE RESULTS: SIGNIFICANT CHANGE UP
SPECIMEN SOURCE: SIGNIFICANT CHANGE UP
SPECIMEN SOURCE: SIGNIFICANT CHANGE UP

## 2021-10-01 PROCEDURE — 99232 SBSQ HOSP IP/OBS MODERATE 35: CPT

## 2021-10-01 RX ORDER — ASPIRIN/CALCIUM CARB/MAGNESIUM 324 MG
1 TABLET ORAL
Qty: 0 | Refills: 0 | DISCHARGE

## 2021-10-01 RX ORDER — MONTELUKAST 4 MG/1
1 TABLET, CHEWABLE ORAL
Qty: 0 | Refills: 0 | DISCHARGE
Start: 2021-10-01

## 2021-10-01 RX ORDER — APIXABAN 2.5 MG/1
1 TABLET, FILM COATED ORAL
Qty: 60 | Refills: 0
Start: 2021-10-01 | End: 2021-10-30

## 2021-10-01 RX ORDER — NEBIVOLOL HYDROCHLORIDE 5 MG/1
10 TABLET ORAL DAILY
Refills: 0 | Status: DISCONTINUED | OUTPATIENT
Start: 2021-10-01 | End: 2021-10-01

## 2021-10-01 RX ORDER — ZOLPIDEM TARTRATE 10 MG/1
10 TABLET ORAL AT BEDTIME
Refills: 0 | Status: DISCONTINUED | OUTPATIENT
Start: 2021-10-01 | End: 2021-10-01

## 2021-10-01 RX ORDER — DILTIAZEM HCL 120 MG
1 CAPSULE, EXT RELEASE 24 HR ORAL
Qty: 30 | Refills: 0
Start: 2021-10-01 | End: 2021-10-30

## 2021-10-01 RX ORDER — DILTIAZEM HCL 120 MG
240 CAPSULE, EXT RELEASE 24 HR ORAL DAILY
Refills: 0 | Status: DISCONTINUED | OUTPATIENT
Start: 2021-10-01 | End: 2021-10-01

## 2021-10-01 RX ORDER — DILTIAZEM HCL 120 MG
1 CAPSULE, EXT RELEASE 24 HR ORAL
Qty: 0 | Refills: 0 | DISCHARGE
Start: 2021-10-01

## 2021-10-01 RX ORDER — MONTELUKAST 4 MG/1
1 TABLET, CHEWABLE ORAL
Qty: 30 | Refills: 0
Start: 2021-10-01 | End: 2021-10-30

## 2021-10-01 RX ADMIN — APIXABAN 5 MILLIGRAM(S): 2.5 TABLET, FILM COATED ORAL at 07:27

## 2021-10-01 RX ADMIN — Medication 100 MILLIGRAM(S): at 11:59

## 2021-10-01 RX ADMIN — Medication 20 MILLIGRAM(S): at 05:57

## 2021-10-01 RX ADMIN — FINASTERIDE 5 MILLIGRAM(S): 5 TABLET, FILM COATED ORAL at 11:59

## 2021-10-01 RX ADMIN — NEBIVOLOL HYDROCHLORIDE 10 MILLIGRAM(S): 5 TABLET ORAL at 05:56

## 2021-10-01 RX ADMIN — Medication 60 MILLIGRAM(S): at 05:56

## 2021-10-01 RX ADMIN — NEBIVOLOL HYDROCHLORIDE 10 MILLIGRAM(S): 5 TABLET ORAL at 11:59

## 2021-10-01 RX ADMIN — MONTELUKAST 10 MILLIGRAM(S): 4 TABLET, CHEWABLE ORAL at 12:03

## 2021-10-01 RX ADMIN — Medication 240 MILLIGRAM(S): at 12:02

## 2021-10-01 RX ADMIN — PIPERACILLIN AND TAZOBACTAM 25 GRAM(S): 4; .5 INJECTION, POWDER, LYOPHILIZED, FOR SOLUTION INTRAVENOUS at 07:27

## 2021-10-01 RX ADMIN — BUDESONIDE AND FORMOTEROL FUMARATE DIHYDRATE 2 PUFF(S): 160; 4.5 AEROSOL RESPIRATORY (INHALATION) at 10:07

## 2021-10-01 RX ADMIN — Medication 81 MILLIGRAM(S): at 12:09

## 2021-10-01 RX ADMIN — Medication 100 MILLIGRAM(S): at 03:53

## 2021-10-01 NOTE — PROGRESS NOTE ADULT - SUBJECTIVE AND OBJECTIVE BOX
Patient is a 76y old  Male who presents with a chief complaint of PNA (30 Sep 2021 15:02)      DATE OF SERVICE: 10-01-21 @ 11:02    SUBJECTIVE / OVERNIGHT EVENTS: overnight events noted    ROS:  Resp: No cough no sputum production  CVS: No chest pain no palpitations no orthopnea  GI: no N/V/D  : no dysuria, no hematuria  Neuro: no weakness no paresthesias          MEDICATIONS  (STANDING):  apixaban 5 milliGRAM(s) Oral every 12 hours  aspirin enteric coated 81 milliGRAM(s) Oral daily  budesonide 160 MICROgram(s)/formoterol 4.5 MICROgram(s) Inhaler 2 Puff(s) Inhalation two times a day  budesonide 160 MICROgram(s)/formoterol 4.5 MICROgram(s) Inhaler 2 Puff(s) Inhalation two times a day  diltiazem    Tablet 60 milliGRAM(s) Oral every 6 hours  finasteride 5 milliGRAM(s) Oral daily  ipratropium    for Nebulization 500 MICROGram(s) Nebulizer every 6 hours  levalbuterol Inhalation 0.63 milliGRAM(s) Inhalation every 6 hours  montelukast 10 milliGRAM(s) Oral daily  nebivolol 10 milliGRAM(s) Oral daily  piperacillin/tazobactam IVPB.. 3.375 Gram(s) IV Intermittent every 8 hours  predniSONE   Tablet 20 milliGRAM(s) Oral every 12 hours  zolpidem 10 milliGRAM(s) Oral at bedtime    MEDICATIONS  (PRN):  acetaminophen   Tablet .. 650 milliGRAM(s) Oral every 6 hours PRN Temp greater or equal to 38.5C (101.3F), Mild Pain (1 - 3)  aluminum hydroxide/magnesium hydroxide/simethicone Suspension 30 milliLiter(s) Oral every 4 hours PRN Dyspepsia  benzonatate 100 milliGRAM(s) Oral three times a day PRN Cough  guaiFENesin Oral Liquid (Sugar-Free) 100 milliGRAM(s) Oral every 6 hours PRN Cough  melatonin 3 milliGRAM(s) Oral at bedtime PRN Insomnia  ondansetron Injectable 4 milliGRAM(s) IV Push every 8 hours PRN Nausea and/or Vomiting        CAPILLARY BLOOD GLUCOSE        I&O's Summary      Vital Signs Last 24 Hrs  T(C): 36.7 (01 Oct 2021 05:54), Max: 37.2 (30 Sep 2021 23:23)  T(F): 98 (01 Oct 2021 05:54), Max: 98.9 (30 Sep 2021 23:23)  HR: 98 (01 Oct 2021 05:54) (94 - 102)  BP: 129/78 (01 Oct 2021 05:54) (117/67 - 148/93)  BP(mean): --  RR: 18 (01 Oct 2021 05:54) (17 - 18)  SpO2: 96% (01 Oct 2021 05:54) (96% - 100%)    PHYSICAL EXAM:  EYES: EOMI, PERRLA,  NECK: Supple, No JVD  CHEST/LUNG: clear   HEART: S1 S2; systolic murmur +   ABDOMEN: Soft, Nontender  EXTREMITIES:  no edema  NEUROLOGY: AO x 3 non-focal    LABS:                        13.3   13.56 )-----------( 217      ( 30 Sep 2021 07:27 )             39.3     09-30    135  |  99  |  24<H>  ----------------------------<  160<H>  4.5   |  21<L>  |  0.99    Ca    9.8      30 Sep 2021 07:27  Phos  2.9     09-30  Mg     2.30     09-30      PTT - ( 30 Sep 2021 09:19 )  PTT:96.3 sec            All consultant(s) notes reviewed and care discussed with other providers        Contact Number, Dr Heard [phone]

## 2021-10-01 NOTE — PROGRESS NOTE ADULT - PROBLEM SELECTOR PLAN 3
continue piperacillin/tazobactam  day 5 today   likely aspiration pneumonia secondary to Zenker's diverticulum  s/p cineesophagogram  results noted
continue piperacillin/tazobactam  day 4 today   likely aspiration pneumonia secondary to Zenker's diverticulum  s/p cineesophagogram  awaiting results
on zosyn
continue piperacillin/tazobactam and azithromycin  day 3 today   likely aspiration pneumonia secondary to Zenker's diverticulum  failed bedside speech and swallow eval   needs formal study
continue piperacillin/tazobactam and azithromycin  patient states his cough is better  continue to monitor   likely aspiration pneumonia secondary to Zenker's diverticulum
on zosyn
on zosyn    10/1:  to cont Augmentin for a total of 7 days
on zosyn

## 2021-10-01 NOTE — PROGRESS NOTE ADULT - SUBJECTIVE AND OBJECTIVE BOX
EP ATTENDING    tele: chronic AF 90s    he denies palpitations, syncope, nor angina, ROS otherwise -        DATE OF SERVICE - 10-01-21     Review of Systems:   Constitutional: [ ] fevers, [ ] chills.   Skin: [ ] dry skin. [ ] rashes.  Psychiatric: [ ] depression, [ ] anxiety.   Gastrointestinal: [ ] BRBPR, [ ] melena.   Neurological: [ ] confusion. [ ] seizures. [ ] shuffling gait.   Ears,Nose,Mouth and Throat: [ ] ear pain [ ] sore throat.   Eyes: [ ] diplopia.   Respiratory: [ ] hemoptysis. [ ] shortness of breath  Cardiovascular: See HPI above  Hematologic/Lymphatic: [ ] anemia. [ ] painful nodes. [ ] prolonged bleeding.   Genitourinary: [ ] hematuria. [ ] flank pain.   Endocrine: [ ] significant change in weight. [ ] intolerance to heat and cold.     Review of systems [ x] otherwise negative, [ ] otherwise unable to obtain    FH: no family history of sudden cardiac death in first degree relatives    SH: [ ] tobacco, [ ] alcohol, [ ] drugs    acetaminophen   Tablet .. 650 milliGRAM(s) Oral every 6 hours PRN  aluminum hydroxide/magnesium hydroxide/simethicone Suspension 30 milliLiter(s) Oral every 4 hours PRN  amoxicillin  875 milliGRAM(s)/clavulanate 1 Tablet(s) Oral two times a day  apixaban 5 milliGRAM(s) Oral every 12 hours  aspirin enteric coated 81 milliGRAM(s) Oral daily  benzonatate 100 milliGRAM(s) Oral three times a day PRN  budesonide 160 MICROgram(s)/formoterol 4.5 MICROgram(s) Inhaler 2 Puff(s) Inhalation two times a day  budesonide 160 MICROgram(s)/formoterol 4.5 MICROgram(s) Inhaler 2 Puff(s) Inhalation two times a day  diltiazem    milliGRAM(s) Oral daily  finasteride 5 milliGRAM(s) Oral daily  guaiFENesin Oral Liquid (Sugar-Free) 100 milliGRAM(s) Oral every 6 hours PRN  ipratropium    for Nebulization 500 MICROGram(s) Nebulizer every 6 hours  levalbuterol Inhalation 0.63 milliGRAM(s) Inhalation every 6 hours  melatonin 3 milliGRAM(s) Oral at bedtime PRN  montelukast 10 milliGRAM(s) Oral daily  nebivolol 10 milliGRAM(s) Oral daily  ondansetron Injectable 4 milliGRAM(s) IV Push every 8 hours PRN  predniSONE   Tablet 20 milliGRAM(s) Oral every 12 hours  zolpidem 10 milliGRAM(s) Oral at bedtime                            13.3   13.56 )-----------( 217      ( 30 Sep 2021 07:27 )             39.3       09-30    135  |  99  |  24<H>  ----------------------------<  160<H>  4.5   |  21<L>  |  0.99    Ca    9.8      30 Sep 2021 07:27  Phos  2.9     09-30  Mg     2.30     09-30              T(C): 36.7 (10-01-21 @ 05:54), Max: 37.2 (09-30-21 @ 23:23)  HR: 98 (10-01-21 @ 05:54) (94 - 102)  BP: 129/78 (10-01-21 @ 05:54) (117/67 - 148/93)  RR: 18 (10-01-21 @ 05:54) (17 - 18)  SpO2: 96% (10-01-21 @ 05:54) (96% - 100%)  Wt(kg): --    I&O's Summary      General: Well nourished in no acute distress. Alert and Oriented * 3.   Head: Normocephalic and atraumatic.   Neck: No JVD. No bruits. Supple. Does not appear to be enlarged.   Cardiovascular: + S1,S2 ; IRR Soft systolic murmur at the left lower sternal border. No rubs noted.    Lungs: CTA b/l. No rhonchi, rales or wheezes.   Abdomen: + BS, soft. Non tender. Non distended. No rebound. No guarding.   Extremities: No clubbing/cyanosis/edema.   Neurologic: Moves all four extremities. Full range of motion.   Skin: Warm and moist. The patient's skin has normal elasticity and good skin turgor.   Psychiatric: Appropriate mood and affect.  Musculoskeletal: Normal range of motion, normal strength      echo unremarkable      A/P) 75 y/o male PMH HTN and persistent AF. EP called for rapid AF    -continue eliquis 5mg bid for lifelong a/c  -continue cardizem 240 and bystolic 10 for rate control  -no further inpatient EP workup expected  -f/u with his cardiologist Dr Gee Burgess after discharge

## 2021-10-01 NOTE — PROGRESS NOTE ADULT - SUBJECTIVE AND OBJECTIVE BOX
INTERVAL HPI/OVERNIGHT EVENTS:    feeling much improved this morning   no n/v  tolerating PO, still with a cough/clearing of throat but "my norm"  happy to be going home    MEDICATIONS  (STANDING):  amoxicillin  875 milliGRAM(s)/clavulanate 1 Tablet(s) Oral two times a day  apixaban 5 milliGRAM(s) Oral every 12 hours  aspirin enteric coated 81 milliGRAM(s) Oral daily  budesonide 160 MICROgram(s)/formoterol 4.5 MICROgram(s) Inhaler 2 Puff(s) Inhalation two times a day  budesonide 160 MICROgram(s)/formoterol 4.5 MICROgram(s) Inhaler 2 Puff(s) Inhalation two times a day  diltiazem    milliGRAM(s) Oral daily  finasteride 5 milliGRAM(s) Oral daily  ipratropium    for Nebulization 500 MICROGram(s) Nebulizer every 6 hours  levalbuterol Inhalation 0.63 milliGRAM(s) Inhalation every 6 hours  montelukast 10 milliGRAM(s) Oral daily  nebivolol 10 milliGRAM(s) Oral daily  predniSONE   Tablet 20 milliGRAM(s) Oral every 12 hours  zolpidem 10 milliGRAM(s) Oral at bedtime    MEDICATIONS  (PRN):  acetaminophen   Tablet .. 650 milliGRAM(s) Oral every 6 hours PRN Temp greater or equal to 38.5C (101.3F), Mild Pain (1 - 3)  aluminum hydroxide/magnesium hydroxide/simethicone Suspension 30 milliLiter(s) Oral every 4 hours PRN Dyspepsia  benzonatate 100 milliGRAM(s) Oral three times a day PRN Cough  guaiFENesin Oral Liquid (Sugar-Free) 100 milliGRAM(s) Oral every 6 hours PRN Cough  melatonin 3 milliGRAM(s) Oral at bedtime PRN Insomnia  ondansetron Injectable 4 milliGRAM(s) IV Push every 8 hours PRN Nausea and/or Vomiting      Allergies    No Known Allergies    Intolerances        Review of Systems:    General:  No wt loss, fevers, chills, night sweats, fatigue   Eyes:  Good vision, no reported pain  ENT:  No sore throat, pain, runny nose, dysphagia  CV:  No pain, palpitations, hypo/hypertension  Resp:  No dyspnea, cough, tachypnea, wheezing  GI:  No pain, No nausea, No vomiting, No diarrhea, No constipation, No weight loss, No fever, No pruritis, No rectal bleeding, No melena, No dysphagia  :  No pain, bleeding, incontinence, nocturia  Muscle:  No pain, weakness  Neuro:  No weakness, tingling, memory problems  Psych:  No fatigue, insomnia, mood problems, depression  Endocrine:  No polyuria, polydypsia, cold/heat intolerance  Heme:  No petechiae, ecchymosis, easy bruisability  Skin:  No rash, tattoos, scars, edema      Vital Signs Last 24 Hrs  T(C): 36.7 (01 Oct 2021 05:54), Max: 37.2 (30 Sep 2021 23:23)  T(F): 98 (01 Oct 2021 05:54), Max: 98.9 (30 Sep 2021 23:23)  HR: 98 (01 Oct 2021 05:54) (94 - 102)  BP: 129/78 (01 Oct 2021 05:54) (117/67 - 148/93)  BP(mean): --  RR: 18 (01 Oct 2021 05:54) (17 - 18)  SpO2: 96% (01 Oct 2021 05:54) (96% - 100%)    PHYSICAL EXAM:    Constitutional: NAD  HEENT: EOMI, throat clear  Neck: No LAD, supple  Respiratory: CTA and P  Cardiovascular: S1 and S2, RRR, no M  Gastrointestinal: BS+, soft, NT/ND, neg HSM,  Extremities: No peripheral edema, neg clubbing, cyanosis  Vascular: 2+ peripheral pulses  Neurological: A/O x 3, no focal deficits  Psychiatric: Normal mood, normal affect  Skin: No rashes      LABS:                        13.3   13.56 )-----------( 217      ( 30 Sep 2021 07:27 )             39.3     09-30    135  |  99  |  24<H>  ----------------------------<  160<H>  4.5   |  21<L>  |  0.99    Ca    9.8      30 Sep 2021 07:27  Phos  2.9     09-30  Mg     2.30     09-30      PTT - ( 30 Sep 2021 09:19 )  PTT:96.3 sec      RADIOLOGY & ADDITIONAL TESTS:

## 2021-10-01 NOTE — PROGRESS NOTE ADULT - PROBLEM SELECTOR PLAN 4
in NSR now
will continue to monitor   not on anticoagulation
better controlled  continue cardizem 60 po q 6 for now  discussed with EP attending   echocardiogram normal  no invasive testing needed  discontinue heparin  start apixaban 5 BID
in NSR now    9/29: now in RVR: cards to see! on iv heparin now:    9/30: on heparin
in NSR now    9/29: now in RVR: cards to see! on iv heparin now:
now uncontrolled   cardiology and EP consultation requested  will continue to follow  recommendations
better controlled  continue Cardizem 240 po CD   discussed with EP attending   echocardiogram normal  start apixaban 5 BID
in NSR now    9/29: now in RVR: cards to see! on iv heparin now:    9/30: on heparin    10/1: stable:on eliquis now!

## 2021-10-01 NOTE — DISCHARGE NOTE NURSING/CASE MANAGEMENT/SOCIAL WORK - PATIENT PORTAL LINK FT
You can access the FollowMyHealth Patient Portal offered by [facility] by registering at the following website: [email or web address]. By joining [employer]’s FollowMyHealth portal, you will also be able to view your health information using other applications (apps) compatible with our system.

## 2021-10-01 NOTE — PROGRESS NOTE ADULT - PROBLEM SELECTOR PLAN 8
he has asthma and wheezing with poor air entry : cont steroids IV for one more day as well as BD and Symbicort: pt has been strongly advised to stop smoking and drinking!    9/29: cont BD and steroids: can change to xopenex if her heart rate is not controlled    9/30:  doing ok : no wheezing: cont bd as well as few more days of steroids    10/1: for few days of oral steoirds and cont BD: he has been strongly adv to stop smoking and drinking!

## 2021-10-01 NOTE — PROVIDER CONTACT NOTE (OTHER) - ASSESSMENT
pt is resting comfortably in chair. pt is refusing AM labs despite education
Patient is asymptomatic on assessment; was asleep but woke him up states he "feels fine"

## 2021-10-01 NOTE — PROGRESS NOTE ADULT - ASSESSMENT
77 yo M with A Fib, asthma, back pain, presenting with fever  Fever, leukocytosis  CT with opacities suspicion for infection  No atypical exposures, no sick contacts  RVP negative  UA+, UCX K pne S zosyn  Treat for bacterial pna, improving with antibiotic?  Legionella negative  Overall,  1) Bacterial pneumonia  - Zenkers cause for aspiration?  - Zosyn 3.375g q 8 through 10/3/21 (Okay for PO Augmentin when DC planning)  - O2 supplementation per primary team  2) Leukocytosis  - Steroid reaction? Improving  - Trend CBCs  - Abx as above  3) Fever  - F/U pending BCX  4) Positive UCX  - Suspect asymp bacteruria/colonizer  - Monitor for urinary symptoms    Signing off. Please call with further questions or change in status.    Gee Mullen MD  Pager [phone]  From 5pm-9am, and on weekends call [phone]

## 2021-10-01 NOTE — PROGRESS NOTE ADULT - PROBLEM SELECTOR PLAN 2
secondary to pneumonia likely aspiration pneumonia
Cont antibiotics: secondary to pneumonia: legionella negative
Cont antibiotics: secondary to pneumonia: legionella negative  9/30: cont antibiotics for 7 days
secondary to pneumonia likely aspiration pneumonia
secondary to pneumonia likely aspiration pneumonia  continue antibiotics
Cont antibiotics: secondary to pneumonia: legionella negative  9/30: cont antibiotics for 7 days  10/1: change to po antibiotics:
secondary to pneumonia likely aspiration pneumonia  continue antibiotics  change to po Augmentin 875 BID
Cont antibiotics: secondary to pneumonia

## 2021-10-01 NOTE — PROGRESS NOTE ADULT - SUBJECTIVE AND OBJECTIVE BOX
Date of service 09/30/2021    chief complaint: SOB    extended hpi: 75yo M w/ PMH HTN, A-fib (on ASA only), asthma, BPH, and chronic back pain (herniated discs, spinal stenosis) presents with worsening cough a/w fever and SOB.    SOB improved today, no CP PAlps or SOB    Review of Systems:   Constitutional: [ ] fevers, [ ] chills.   Skin: [ ] dry skin. [ ] rashes.  Psychiatric: [ ] depression, [ ] anxiety.   Gastrointestinal: [ ] BRBPR, [ ] melena.   Neurological: [ ] confusion. [ ] seizures. [ ] shuffling gait.   Ears,Nose,Mouth and Throat: [ ] ear pain [ ] sore throat.   Eyes: [ ] diplopia.   Respiratory: [ ] hemoptysis. [ ] shortness of breath  Cardiovascular: See HPI above  Hematologic/Lymphatic: [ ] anemia. [ ] painful nodes. [ ] prolonged bleeding.   Genitourinary: [ ] hematuria. [ ] flank pain.   Endocrine: [ ] significant change in weight. [ ] intolerance to heat and cold.     Review of systems [x ] otherwise negative, [ ] otherwise unable to obtain    FH: no family history of sudden cardiac death in first degree relatives    SH: [ ] tobacco, [ ] alcohol, [ ] drugs    DATE OF SERVICE: 10-01-21    Patient denies chest pain or shortness of breath.   Review of symptoms otherwise negative.    acetaminophen   Tablet .. 650 milliGRAM(s) Oral every 6 hours PRN  aluminum hydroxide/magnesium hydroxide/simethicone Suspension 30 milliLiter(s) Oral every 4 hours PRN  amoxicillin  875 milliGRAM(s)/clavulanate 1 Tablet(s) Oral two times a day  apixaban 5 milliGRAM(s) Oral every 12 hours  aspirin enteric coated 81 milliGRAM(s) Oral daily  benzonatate 100 milliGRAM(s) Oral three times a day PRN  budesonide 160 MICROgram(s)/formoterol 4.5 MICROgram(s) Inhaler 2 Puff(s) Inhalation two times a day  budesonide 160 MICROgram(s)/formoterol 4.5 MICROgram(s) Inhaler 2 Puff(s) Inhalation two times a day  diltiazem    milliGRAM(s) Oral daily  finasteride 5 milliGRAM(s) Oral daily  guaiFENesin Oral Liquid (Sugar-Free) 100 milliGRAM(s) Oral every 6 hours PRN  ipratropium    for Nebulization 500 MICROGram(s) Nebulizer every 6 hours  levalbuterol Inhalation 0.63 milliGRAM(s) Inhalation every 6 hours  melatonin 3 milliGRAM(s) Oral at bedtime PRN  montelukast 10 milliGRAM(s) Oral daily  nebivolol 10 milliGRAM(s) Oral daily  ondansetron Injectable 4 milliGRAM(s) IV Push every 8 hours PRN  predniSONE   Tablet 20 milliGRAM(s) Oral every 12 hours  zolpidem 10 milliGRAM(s) Oral at bedtime                            13.3   13.56 )-----------( 217      ( 30 Sep 2021 07:27 )             39.3       Hemoglobin: 13.3 g/dL (09-30 @ 07:27)  Hemoglobin: 12.9 g/dL (09-29 @ 15:59)  Hemoglobin: 12.8 g/dL (09-29 @ 07:31)  Hemoglobin: 12.8 g/dL (09-28 @ 07:34)  Hemoglobin: 12.6 g/dL (09-27 @ 07:58)      09-30    135  |  99  |  24<H>  ----------------------------<  160<H>  4.5   |  21<L>  |  0.99    Ca    9.8      30 Sep 2021 07:27  Phos  2.9     09-30  Mg     2.30     09-30      Creatinine Trend: 0.99<--, 0.90<--, 0.86<--, 0.93<--, 1.03<--    COAGS:           T(C): 36.9 (10-01-21 @ 12:05), Max: 37.2 (09-30-21 @ 23:23)  HR: 78 (10-01-21 @ 12:05) (78 - 98)  BP: 130/87 (10-01-21 @ 12:05) (129/78 - 148/93)  RR: 19 (10-01-21 @ 12:05) (17 - 19)  SpO2: 95% (10-01-21 @ 12:05) (95% - 97%)  Wt(kg): --    I&O's Summary      General: Well nourished in no acute distress. Alert and Oriented * 3.   Head: Normocephalic and atraumatic.   Neck: No JVD. No bruits. Supple. Does not appear to be enlarged.   Cardiovascular: + S1,S2 ; RRR Soft systolic murmur at the left lower sternal border. No rubs noted.    Lungs: CTA b/l. No rhonchi, rales or wheezes.   Abdomen: + BS, soft. Non tender. Non distended. No rebound. No guarding.   Extremities: No clubbing/cyanosis/edema.   Neurologic: Moves all four extremities. Full range of motion.   Skin: Warm and moist. The patient's skin has normal elasticity and good skin turgor.   Psychiatric: Appropriate mood and affect.  Musculoskeletal: Normal range of motion, normal strength    DATA    tele- AF 90s    < from: Transthoracic Echocardiogram (09.29.21 @ 16:11) >  CONCLUSIONS:  1. Mitral annular calcification, otherwise normal mitral  valve. Minimal mitral regurgitation.  2. Calcified trileaflet aortic valve with normal opening.  Mild aortic regurgitation.  3. Normal left ventricular systolic function. No segmental  wall motion abnormalities.  4. Normal right ventricular size and function.  ------------------------------------------------------------------------  Confirmed on  9/29/2021 - 18:10:30 by LISSETH Howell    < end of copied text >    ASSESSMENT/PLAN: 	75yo M w/ PMH HTN, A-fib (on ASA only, OP Cardio Dr Torres), HTN, asthma, BPH, and chronic back pain (herniated discs, spinal stenosis) presents with worsening cough a/w fever and SOB, found with presumed aspiration PNA    --Cardiology consulted for Rapid AFib and SOB  --Pt not on any AVN at home  --start Cardizem 30 Q6  --hold on increased BBlockers given bronchospasm  --EP consult DR Burgess appreciated  --[id number], recommend lifelong AC.  D/w patient and wife in detail  --TTE noted with structurally normal heart  - Outpt cardiac f/u    Ander Goins MD, [facility]  BEEPER [phone]

## 2021-10-01 NOTE — PROGRESS NOTE ADULT - ASSESSMENT
76 year old male with fever, cough and dysphagia     Dysphagia   related to his Zenker's Diverticulum  Defer Esophagram as d/w advanced GI as Cine results sufficient enough   outpt f/u with [facility] for endoscopic evaluation; input appreciated    Dysphagia 3 diet per SLP; f/u Cine results     Pneumonia/ SOB   Abx duration per ID   further management per primary team     Afib   rate control   gi ppx with protonix daily on a/c   EP input appreciated     I reviewed the overnight course of events on the unit, re-confirming the patient history. I discussed the care with the patient and their family. The plan of care was discussed with the physician assistant and modifications were made to the notation where appropriate. Differential diagnosis and plan of care discussed with patient after the evaluation. Advanced care planning was discussed with patient and family.  Advanced care planning forms were reviewed and discussed.  Risks, benefits and alternatives of gastroenterologic procedures were discussed in detail and all questions were answered. 35 minutes spent on total encounter of which more than fifty percent of the encounter was spent counseling and/or coordinating care by the attending physician.

## 2021-10-01 NOTE — PROVIDER CONTACT NOTE (OTHER) - RECOMMENDATIONS
inform provider, continue to educate
AM cardiac medication due will administer medication; Provider notified. Will continue to monitor

## 2021-10-01 NOTE — PROVIDER CONTACT NOTE (OTHER) - ACTION/TREATMENT ORDERED:
AM cardiac medication due will administer medication; Provider notified. Will continue to monitor
provider made aware. will continue to educate patient

## 2021-10-01 NOTE — PROGRESS NOTE ADULT - SUBJECTIVE AND OBJECTIVE BOX
Date of Service: 10-01-21 @ 12:04    Patient is a 76y old  Male who presents with a chief complaint of PNA (01 Oct 2021 11:02)      Any change in ROS: his doingpretty good: no sob : COUGH ISMUCH less: for dc today     MEDICATIONS  (STANDING):  amoxicillin  875 milliGRAM(s)/clavulanate 1 Tablet(s) Oral two times a day  apixaban 5 milliGRAM(s) Oral every 12 hours  aspirin enteric coated 81 milliGRAM(s) Oral daily  budesonide 160 MICROgram(s)/formoterol 4.5 MICROgram(s) Inhaler 2 Puff(s) Inhalation two times a day  budesonide 160 MICROgram(s)/formoterol 4.5 MICROgram(s) Inhaler 2 Puff(s) Inhalation two times a day  diltiazem    milliGRAM(s) Oral daily  finasteride 5 milliGRAM(s) Oral daily  ipratropium    for Nebulization 500 MICROGram(s) Nebulizer every 6 hours  levalbuterol Inhalation 0.63 milliGRAM(s) Inhalation every 6 hours  montelukast 10 milliGRAM(s) Oral daily  nebivolol 10 milliGRAM(s) Oral daily  predniSONE   Tablet 20 milliGRAM(s) Oral every 12 hours  zolpidem 10 milliGRAM(s) Oral at bedtime    MEDICATIONS  (PRN):  acetaminophen   Tablet .. 650 milliGRAM(s) Oral every 6 hours PRN Temp greater or equal to 38.5C (101.3F), Mild Pain (1 - 3)  aluminum hydroxide/magnesium hydroxide/simethicone Suspension 30 milliLiter(s) Oral every 4 hours PRN Dyspepsia  benzonatate 100 milliGRAM(s) Oral three times a day PRN Cough  guaiFENesin Oral Liquid (Sugar-Free) 100 milliGRAM(s) Oral every 6 hours PRN Cough  melatonin 3 milliGRAM(s) Oral at bedtime PRN Insomnia  ondansetron Injectable 4 milliGRAM(s) IV Push every 8 hours PRN Nausea and/or Vomiting    Vital Signs Last 24 Hrs  T(C): 36.7 (01 Oct 2021 05:54), Max: 37.2 (30 Sep 2021 23:23)  T(F): 98 (01 Oct 2021 05:54), Max: 98.9 (30 Sep 2021 23:23)  HR: 98 (01 Oct 2021 05:54) (94 - 102)  BP: 129/78 (01 Oct 2021 05:54) (117/67 - 148/93)  BP(mean): --  RR: 18 (01 Oct 2021 05:54) (17 - 18)  SpO2: 96% (01 Oct 2021 05:54) (96% - 100%)    I&O's Summary        Physical Exam:   GENERAL: NAD, well-groomed, well-developed  HEENT: AZUCENA/   Atraumatic, Normocephalic  ENMT: No tonsillar erythema, exudates, or enlargement; Moist mucous membranes, Good dentition, No lesions  NECK: Supple, No JVD, Normal thyroid  CHEST/LUNG: no wheezing  CVS: Regular rate and rhythm; No murmurs, rubs, or gallops  GI: : Soft, Nontender, Nondistended; Bowel sounds present  NERVOUS SYSTEM:  Alert & Oriented X3  EXTREMITIES:  2+ Peripheral Pulses, No clubbing, cyanosis, or edema  LYMPH: No lymphadenopathy noted  SKIN: No rashes or lesions  ENDOCRINOLOGY: No Thyromegaly  PSYCH: Appropriate    Labs:  26, 22, 24                            13.3   13.56 )-----------( 217      ( 30 Sep 2021 07:27 )             39.3                         12.9   17.75 )-----------( 207      ( 29 Sep 2021 15:59 )             38.1                         12.8   18.45 )-----------( 224      ( 29 Sep 2021 07:31 )             38.2                         12.8   19.08 )-----------( 184      ( 28 Sep 2021 07:34 )             37.8     09-30    135  |  99  |  24<H>  ----------------------------<  160<H>  4.5   |  21<L>  |  0.99  09-29    138  |  99  |  22  ----------------------------<  154<H>  5.6<H>   |  13<L>  |  0.90  09-28    139  |  101  |  15  ----------------------------<  157<H>  3.7   |  23  |  0.86    Ca    9.8      30 Sep 2021 07:27  Phos  2.9     09-30  Mg     2.30     09-30      CAPILLARY BLOOD GLUCOSE            PTT - ( 30 Sep 2021 09:19 )  PTT:96.3 sec    Serum Pro-Brain Natriuretic Peptide: 1509 pg/mL (09-29 @ 07:43)        RECENT CULTURES:  09-27 @ 18:28 .Sputum Sputum       No polymorphonuclear leukocytes per low power field  No Squamous epithelial cells per low power field  Few Gram positive cocci in pairs per oil power field  Few Gram Negative Rods per oil power field           Normal Respiratory Stephanie present    09-27 @ 07:25 .Throat Throat            rad< from: Xray Cinesophagram Swallow Function w/ Contrast (09.30.21 @ 09:50) >    EXAM:  XR SWAL FUNC SALOMÓN VID CON STDY        PROCEDURE DATE:  Sep 30 2021         INTERPRETATION:  CLINICAL INFORMATION: Dysphagia.    TECHNIQUE: A cine esophagogram was performed under fluoroscopy in conjunction with speech pathology.    Time: 2.5 minutes    FINDINGS/  IMPRESSION:    There is trace penetration of thin liquids without aspiration. There is no penetration or aspiration of the other tested consistencies.    There is a large left-sided [place]-Raquel esophageal diverticulum at thelevel of C5/6.    For further information and recommendations, please refer to the speech pathologist final report which is available for review in the electronic medical record.    --- End of Report ---            JENY SHERIDAN MD; Resident Radiology  This document has been electronically signed.  FELISA CONTRERAS MD; Attending Radiologist  This document has been electronically signed. Oct  1 2021 11:06AM    < end of copied text >      No beta hemolytic streptococci or Arcanobacterium haemolyticum isolated.  (Throat special examined for beta hemolytic streptococci and  Arcanobacterium haemolyticum)    09-26 @ 18:39 .Blood Blood-Peripheral                No growth to date.    09-26 @ 18:36 .Blood Blood-Peripheral                No growth to date.    09-26 @ 10:37 Clean Catch Clean Catch (Midstream)   SEFERINO      Klebsiella pneumoniae  Klebsiella pneumoniae     >100,000 CFU/ml Klebsiella pneumoniae          RESPIRATORY CULTURES:          Studies  Chest X-RAY  CT SCAN Chest   Venous Dopplers: LE:   CT Abdomen  Others

## 2021-10-01 NOTE — PROGRESS NOTE ADULT - PROBLEM SELECTOR PLAN 1
he seems to have gotten better since yesterday :  he is still ot moving much air: cont steroids IV till tomorrow also: cont BD and Symbicort:  His legionella is negative: on zosyn as suspicion for aspiration is really high    9/29: seems to be doing better: no SOB : cough is much better/ air entry poor: cont BD: as well as steroids: change to po steroids    9/30: he is doing better: on zosyn and oral prednisone: he is feeling much better: ad v strongly to stop smoking:  and drinking:  10/1: he is doing pretty well: heis afebrile: WBC is high secondary to steroids: going home today on oral antibiotics for total of 7 days: he needs to eat carefully as he has diverticulum in esophagus and his likely rocha of aspiration is pretty high! He understands that
resolving   will continue to monitor
he seems to have gotten better since yesterday :  he is still ot moving much air: cont steorids IV till tomorrow also: cont BD and Symbicort:  His legionella is negative: on zosyn as suspicion for aspiration is really h igh
he seems to have gotten better since yesterday :  he is still ot moving much air: cont steroids IV till tomorrow also: cont BD and Symbicort:  His legionella is negative: on zosyn as suspicion for aspiration is really high    9/29: seems to be doing better: no SOB : cough is much better/ air entry poor: cont BD: as well as steroids: change to po steroids    9/30: he is doing better: on zosyn and oral prednisone: he is feeling much better: ad v strongly to stop smoking:  and drinking:
he seems to have gotten better since yesterday :  he is still ot moving much air: cont steorids IV till tomorrow also: cont BD and Symbicort:  His legionella is negative: on zosyn as suspicion for aspiration is really h igh    9/29: seems to be doing better: no SOB : cough is much better/ air entry poor: cont BD: as well as steorids: change to po seroids
resolved
resolved  remains at 95 - 96% on RA  continue to monitor
resolved

## 2021-10-01 NOTE — PROGRESS NOTE ADULT - PROBLEM SELECTOR PROBLEM 4
Atrial fibrillation with rapid ventricular response

## 2021-10-01 NOTE — PROVIDER CONTACT NOTE (OTHER) - BACKGROUND
pt admitted for PNA
75yo M w/ PMH HTN, A-fib, asthma, BPH, and chronic back pain (herniated discs, spinal stenosis)

## 2021-10-01 NOTE — PHARMACOTHERAPY INTERVENTION NOTE - COMMENTS
Patient's wife counseled on relevant medication indications, dosing, administration, side effects, and monitoring.  Also reviewed importance of follow-up with outpatient care providers and medication adherence. Patient's wife demonstrated understanding.

## 2021-10-01 NOTE — PROGRESS NOTE ADULT - SUBJECTIVE AND OBJECTIVE BOX
CC: F/U for PNA    Saw/spoke to patient. Unchanged. No new complaints.    Allergies  No Known Allergies    ANTIMICROBIALS:  amoxicillin  875 milliGRAM(s)/clavulanate 1 two times a day    PE:    Vital Signs Last 24 Hrs  T(C): 36.9 (01 Oct 2021 12:05), Max: 37.2 (30 Sep 2021 23:23)  T(F): 98.4 (01 Oct 2021 12:05), Max: 98.9 (30 Sep 2021 23:23)  HR: 78 (01 Oct 2021 12:05) (78 - 102)  BP: 130/87 (01 Oct 2021 12:05) (129/78 - 148/93)  RR: 19 (01 Oct 2021 12:05) (17 - 19)  SpO2: 95% (01 Oct 2021 12:05) (95% - 100%)    Gen: AOx3, NAD, non-toxic  CV: S1+S2 normal, nontachycardic  Resp: Clear bilat, no resp distress, no crackles/wheezes  Abd: Soft, nontender, +BS  Ext: No LE edema, no wounds    LABS:                        13.3   13.56 )-----------( 217      ( 30 Sep 2021 07:27 )             39.3     09-30    135  |  99  |  24<H>  ----------------------------<  160<H>  4.5   |  21<L>  |  0.99    Ca    9.8      30 Sep 2021 07:27  Phos  2.9     09-30  Mg     2.30     09-30    MICROBIOLOGY:    .Sputum Sputum  09-27-21   Normal Respiratory Stephanie present  --    No polymorphonuclear leukocytes per low power field  No Squamous epithelial cells per low power field  Few Gram positive cocci in pairs per oil power field  Few Gram Negative Rods per oil power field    .Throat Throat  09-27-21   No beta hemolytic streptococci or Arcanobacterium haemolyticum isolated.  (Throat special examined for beta hemolytic streptococci and  Arcanobacterium haemolyticum)  --  --    .Blood Blood-Peripheral  09-26-21   No growth to date.  --  --    .Blood Blood-Peripheral  09-26-21   No growth to date.  --  --    Clean Catch Clean Catch (Midstream)  09-26-21   >100,000 CFU/ml Klebsiella pneumoniae  --  Klebsiella pneumoniae    Rapid RVP Result: NotDetec (09-26 @ 11:03)    (otherwise reviewed)    RADIOLOGY:    9/30 XR:    FINDINGS/  IMPRESSION:    There is trace penetration of thin liquids without aspiration. There is no penetration or aspiration of the other tested consistencies.    There is a large left-sided Blair-Raquel esophageal diverticulum at the level of C5/6.    For further information and recommendations, please refer to the speech pathologist final report which is available for review in the electronic medical record.

## 2021-10-01 NOTE — PROGRESS NOTE ADULT - PROVIDER SPECIALTY LIST ADULT
Gastroenterology
Infectious Disease
Cardiology
Electrophysiology
Electrophysiology
Gastroenterology
Gastroenterology
Cardiology
Infectious Disease
Infectious Disease
Internal Medicine
Gastroenterology
Gastroenterology
Internal Medicine
Pulmonology
Internal Medicine
Pulmonology
Pulmonology
Internal Medicine
Internal Medicine
Pulmonology

## 2021-10-01 NOTE — CHART NOTE - NSCHARTNOTEFT_GEN_A_CORE
Notified by RN that patient tachycardic w/ A Fib on tele to 160s. Patient seen at bedside by provider. Patient returned from ambulating to bathroom. Patient instructed by provider to remain in bed until HR improves. HR still maintained in a fib to 150s. Patient denies CP, palpitations, HA, SOB, blurry vision, sweating and states he "feels fine".    ICU Vital Signs Last 24 Hrs  T(C): 37.1 (29 Sep 2021 06:08), Max: 37.2 (28 Sep 2021 20:00)  T(F): 98.7 (29 Sep 2021 06:08), Max: 98.9 (28 Sep 2021 20:00)  HR: 163 (29 Sep 2021 06:08) (72 - 163)  BP: 155/88 (29 Sep 2021 06:08) (130/75 - 157/80)  BP(mean): --  ABP: --  ABP(mean): --  RR: 17 (29 Sep 2021 06:08) (17 - 18)  SpO2: 96% (29 Sep 2021 06:08) (96% - 100%)      Plan:  -Lopressor 5mg IV x1 STAT -reassess vitals sp dose   -am Bystolic to be given
Prior Auth Completed & Aprroved  KEY: [id number] – [place] Case ID: [id number]    CaseId:[id number];Status:Approved;Review Type:Prior Auth;Coverage Start Date:08/31/2021;Coverage End Date:09/30/2022;
Spoke to Cardiology @ Bedside, patient started on cardizem oral, OK to continue Bystolic @ current dose  will increase Cardizem dose as needed for HR control

## 2021-10-08 ENCOUNTER — TRANSCRIPTION ENCOUNTER (OUTPATIENT)
Age: 76
End: 2021-10-08

## 2021-10-12 PROBLEM — J45.909 UNSPECIFIED ASTHMA, UNCOMPLICATED: Chronic | Status: ACTIVE | Noted: 2021-09-27

## 2021-10-12 PROBLEM — N40.0 BENIGN PROSTATIC HYPERPLASIA WITHOUT LOWER URINARY TRACT SYMPTOMS: Chronic | Status: ACTIVE | Noted: 2021-09-27

## 2021-10-12 PROBLEM — M48.00 SPINAL STENOSIS, SITE UNSPECIFIED: Chronic | Status: ACTIVE | Noted: 2021-09-27

## 2021-10-19 ENCOUNTER — APPOINTMENT (OUTPATIENT)
Dept: GASTROENTEROLOGY | Facility: HOSPITAL | Age: 76
End: 2021-10-19
Payer: COMMERCIAL

## 2021-10-19 PROCEDURE — XXXXX: CPT

## 2021-11-01 DIAGNOSIS — Z01.812 ENCOUNTER FOR PREPROCEDURAL LABORATORY EXAMINATION: ICD-10-CM

## 2021-11-15 ENCOUNTER — OUTPATIENT (OUTPATIENT)
Dept: OUTPATIENT SERVICES | Facility: HOSPITAL | Age: 76
LOS: 1 days | End: 2021-11-15
Payer: COMMERCIAL

## 2021-11-15 VITALS
HEART RATE: 80 BPM | TEMPERATURE: 97 F | RESPIRATION RATE: 16 BRPM | OXYGEN SATURATION: 98 % | WEIGHT: 160.06 LBS | HEIGHT: 67 IN | DIASTOLIC BLOOD PRESSURE: 80 MMHG | SYSTOLIC BLOOD PRESSURE: 142 MMHG

## 2021-11-15 DIAGNOSIS — M19.90 UNSPECIFIED OSTEOARTHRITIS, UNSPECIFIED SITE: ICD-10-CM

## 2021-11-15 DIAGNOSIS — I10 ESSENTIAL (PRIMARY) HYPERTENSION: ICD-10-CM

## 2021-11-15 DIAGNOSIS — Z96.653 PRESENCE OF ARTIFICIAL KNEE JOINT, BILATERAL: Chronic | ICD-10-CM

## 2021-11-15 DIAGNOSIS — J45.909 UNSPECIFIED ASTHMA, UNCOMPLICATED: ICD-10-CM

## 2021-11-15 DIAGNOSIS — K22.5 DIVERTICULUM OF ESOPHAGUS, ACQUIRED: ICD-10-CM

## 2021-11-15 DIAGNOSIS — Z91.89 OTHER SPECIFIED PERSONAL RISK FACTORS, NOT ELSEWHERE CLASSIFIED: ICD-10-CM

## 2021-11-15 DIAGNOSIS — Z90.79 ACQUIRED ABSENCE OF OTHER GENITAL ORGAN(S): Chronic | ICD-10-CM

## 2021-11-15 LAB
ANION GAP SERPL CALC-SCNC: 12 MMOL/L — SIGNIFICANT CHANGE UP (ref 7–14)
BUN SERPL-MCNC: 20 MG/DL — SIGNIFICANT CHANGE UP (ref 7–23)
CALCIUM SERPL-MCNC: 9.8 MG/DL — SIGNIFICANT CHANGE UP (ref 8.4–10.5)
CHLORIDE SERPL-SCNC: 102 MMOL/L — SIGNIFICANT CHANGE UP (ref 98–107)
CO2 SERPL-SCNC: 26 MMOL/L — SIGNIFICANT CHANGE UP (ref 22–31)
CREAT SERPL-MCNC: 1.03 MG/DL — SIGNIFICANT CHANGE UP (ref 0.5–1.3)
GLUCOSE SERPL-MCNC: 96 MG/DL — SIGNIFICANT CHANGE UP (ref 70–99)
HCT VFR BLD CALC: 45.3 % — SIGNIFICANT CHANGE UP (ref 39–50)
HGB BLD-MCNC: 14.4 G/DL — SIGNIFICANT CHANGE UP (ref 13–17)
MCHC RBC-ENTMCNC: 31 PG — SIGNIFICANT CHANGE UP (ref 27–34)
MCHC RBC-ENTMCNC: 31.8 GM/DL — LOW (ref 32–36)
MCV RBC AUTO: 97.4 FL — SIGNIFICANT CHANGE UP (ref 80–100)
NRBC # BLD: 0 /100 WBCS — SIGNIFICANT CHANGE UP
NRBC # FLD: 0 K/UL — SIGNIFICANT CHANGE UP
PLATELET # BLD AUTO: 197 K/UL — SIGNIFICANT CHANGE UP (ref 150–400)
POTASSIUM SERPL-MCNC: 4.6 MMOL/L — SIGNIFICANT CHANGE UP (ref 3.5–5.3)
POTASSIUM SERPL-SCNC: 4.6 MMOL/L — SIGNIFICANT CHANGE UP (ref 3.5–5.3)
RBC # BLD: 4.65 M/UL — SIGNIFICANT CHANGE UP (ref 4.2–5.8)
RBC # FLD: 12.7 % — SIGNIFICANT CHANGE UP (ref 10.3–14.5)
SODIUM SERPL-SCNC: 140 MMOL/L — SIGNIFICANT CHANGE UP (ref 135–145)
WBC # BLD: 9.61 K/UL — SIGNIFICANT CHANGE UP (ref 3.8–10.5)
WBC # FLD AUTO: 9.61 K/UL — SIGNIFICANT CHANGE UP (ref 3.8–10.5)

## 2021-11-15 PROCEDURE — 93010 ELECTROCARDIOGRAM REPORT: CPT

## 2021-11-15 RX ORDER — NEBIVOLOL HYDROCHLORIDE 5 MG/1
1 TABLET ORAL
Qty: 0 | Refills: 0 | DISCHARGE

## 2021-11-15 RX ORDER — CHOLECALCIFEROL (VITAMIN D3) 125 MCG
1 CAPSULE ORAL
Qty: 0 | Refills: 0 | DISCHARGE

## 2021-11-15 RX ORDER — FINASTERIDE 5 MG/1
1 TABLET, FILM COATED ORAL
Qty: 0 | Refills: 0 | DISCHARGE

## 2021-11-15 RX ORDER — LOSARTAN/HYDROCHLOROTHIAZIDE 100MG-25MG
0.5 TABLET ORAL
Qty: 0 | Refills: 0 | DISCHARGE

## 2021-11-15 RX ORDER — ASPIRIN/CALCIUM CARB/MAGNESIUM 324 MG
1 TABLET ORAL
Qty: 0 | Refills: 0 | DISCHARGE

## 2021-11-15 RX ORDER — BUDESONIDE AND FORMOTEROL FUMARATE DIHYDRATE 160; 4.5 UG/1; UG/1
2 AEROSOL RESPIRATORY (INHALATION)
Qty: 0 | Refills: 0 | DISCHARGE

## 2021-11-15 RX ORDER — ZOLPIDEM TARTRATE 10 MG/1
1 TABLET ORAL
Qty: 0 | Refills: 0 | DISCHARGE

## 2021-11-15 NOTE — H&P PST ADULT - GASTROINTESTINAL COMMENTS
Dx diverticulum of esophagus- reports occasional lower abdominal pain when changing positions unrelated to bowels Dx diverticulum of esophagus

## 2021-11-15 NOTE — H&P PST ADULT - PROBLEM SELECTOR PLAN 2
Assessment and Plan: Patient instructed to take bystolic on day of procedure, verbalized understanding. Assessment and Plan: Patient instructed to take Bystolic on day of procedure, verbalized understanding.

## 2021-11-15 NOTE — H&P PST ADULT - HISTORY OF PRESENT ILLNESS
76 yr old male presents with preop dx diverticulum of esophagus scheduled for Z poem anesthesia, patient reports hx of hoarseness, cough x1 yr, and bilateral PNA developed in 10/2021, imaging noted diverticulum of esophagus

## 2021-11-15 NOTE — H&P PST ADULT - PROBLEM SELECTOR PLAN 1
Assessment and Plan: Patient scheduled for surgery on 11/29/21  Patient provided with verbal and written presurgical instructions; verbalized understanding  with teach back.    Patient instructed to call surgeon to schedule COVID appointment     Cardiac evaluation requested by PST for age, aspirin plan, patient verbalized understanding, will obtain  Stress test requested    Case discussed with Dr. Fierro Assessment and Plan: Patient scheduled for surgery on 11/29/21  Patient provided with verbal and written presurgical instructions; verbalized understanding  with teach back.    Patient instructed to call surgeon to schedule COVID appointment     Cardiac evaluation requested by PST for age, aspirin plan, abnormal EKG patient verbalized understanding, will obtain, case d/w with  cardiologist, EKG faxed to office, patient instructed he may stop aspirin one week prior to DOS  Stress test requested    Case discussed with Dr. Fierro

## 2021-11-15 NOTE — H&P PST ADULT - NSANTHOSAYNRD_GEN_A_CORE
No. SADE screening performed.  STOP BANG Legend: 0-2 = LOW Risk; 3-4 = INTERMEDIATE Risk; 5-8 = HIGH Risk

## 2021-11-15 NOTE — H&P PST ADULT - RESPIRATORY AND THORAX COMMENTS
Dx Diverticulum of esophagus - coughing improving mostly in A.M. and eating-chest tightness and pain occur when coughing is severe, relieved when coughing stops

## 2021-11-15 NOTE — H&P PST ADULT - NSICDXPASTMEDICALHX_GEN_ALL_CORE_FT
PAST MEDICAL HISTORY:  Acquired diverticulum of esophagus     Asthma     Atrial fibrillation paroxysmal    BPH (benign prostatic hyperplasia)     HTN (hypertension)     Insomnia     Lumbar herniated disc     OA (osteoarthritis)     PNA (pneumonia) 10/2021    Spinal stenosis

## 2021-11-15 NOTE — H&P PST ADULT - NEGATIVE ENMT SYMPTOMS
no hearing difficulty/no ear pain/no tinnitus/no vertigo/no sinus symptoms/no nasal congestion/no abnormal taste sensation/no dry mouth/no throat pain

## 2021-11-15 NOTE — H&P PST ADULT - NSICDXPASTSURGICALHX_GEN_ALL_CORE_FT
PAST SURGICAL HISTORY:  S/p total knee replacement, bilateral     S/P TURP (transurethral resection of prostate) followed by blue light procedure

## 2021-11-26 PROBLEM — K22.5 DIVERTICULUM OF ESOPHAGUS, ACQUIRED: Chronic | Status: ACTIVE | Noted: 2021-11-15

## 2021-11-26 PROBLEM — I48.91 UNSPECIFIED ATRIAL FIBRILLATION: Chronic | Status: ACTIVE | Noted: 2021-11-15

## 2021-11-26 PROBLEM — G47.00 INSOMNIA, UNSPECIFIED: Chronic | Status: ACTIVE | Noted: 2021-11-15

## 2021-11-26 PROBLEM — M51.26 OTHER INTERVERTEBRAL DISC DISPLACEMENT, LUMBAR REGION: Chronic | Status: ACTIVE | Noted: 2021-11-15

## 2021-11-26 PROBLEM — J18.9 PNEUMONIA, UNSPECIFIED ORGANISM: Chronic | Status: ACTIVE | Noted: 2021-11-15

## 2021-11-29 ENCOUNTER — INPATIENT (INPATIENT)
Facility: HOSPITAL | Age: 76
LOS: 0 days | Discharge: HOME | End: 2021-11-30
Attending: INTERNAL MEDICINE | Admitting: INTERNAL MEDICINE
Payer: COMMERCIAL

## 2021-11-29 ENCOUNTER — APPOINTMENT (OUTPATIENT)
Dept: GASTROENTEROLOGY | Facility: HOSPITAL | Age: 76
End: 2021-11-29

## 2021-11-29 VITALS
RESPIRATION RATE: 16 BRPM | TEMPERATURE: 98 F | HEIGHT: 69 IN | DIASTOLIC BLOOD PRESSURE: 74 MMHG | SYSTOLIC BLOOD PRESSURE: 151 MMHG | HEART RATE: 63 BPM | WEIGHT: 167.99 LBS | OXYGEN SATURATION: 97 %

## 2021-11-29 DIAGNOSIS — Z90.79 ACQUIRED ABSENCE OF OTHER GENITAL ORGAN(S): Chronic | ICD-10-CM

## 2021-11-29 DIAGNOSIS — Z98.890 OTHER SPECIFIED POSTPROCEDURAL STATES: ICD-10-CM

## 2021-11-29 DIAGNOSIS — K22.5 DIVERTICULUM OF ESOPHAGUS, ACQUIRED: ICD-10-CM

## 2021-11-29 DIAGNOSIS — Z96.653 PRESENCE OF ARTIFICIAL KNEE JOINT, BILATERAL: Chronic | ICD-10-CM

## 2021-11-29 DIAGNOSIS — I48.0 PAROXYSMAL ATRIAL FIBRILLATION: ICD-10-CM

## 2021-11-29 DIAGNOSIS — N40.0 BENIGN PROSTATIC HYPERPLASIA WITHOUT LOWER URINARY TRACT SYMPTOMS: ICD-10-CM

## 2021-11-29 DIAGNOSIS — Q39.6 CONGENITAL DIVERTICULUM OF ESOPHAGUS: ICD-10-CM

## 2021-11-29 DIAGNOSIS — I10 ESSENTIAL (PRIMARY) HYPERTENSION: ICD-10-CM

## 2021-11-29 LAB — SARS-COV-2 N GENE NPH QL NAA+PROBE: NOT DETECTED

## 2021-11-29 PROCEDURE — 43236 UPPR GI SCOPE W/SUBMUC INJ: CPT | Mod: 59

## 2021-11-29 PROCEDURE — 43247 EGD REMOVE FOREIGN BODY: CPT

## 2021-11-29 PROCEDURE — 99223 1ST HOSP IP/OBS HIGH 75: CPT

## 2021-11-29 PROCEDURE — 43270 EGD LESION ABLATION: CPT | Mod: 59

## 2021-11-29 RX ORDER — ZOLPIDEM TARTRATE 10 MG/1
10 TABLET ORAL ONCE
Refills: 0 | Status: DISCONTINUED | OUTPATIENT
Start: 2021-11-29 | End: 2021-11-29

## 2021-11-29 RX ORDER — CHOLECALCIFEROL (VITAMIN D3) 125 MCG
1 CAPSULE ORAL
Qty: 0 | Refills: 0 | DISCHARGE

## 2021-11-29 RX ORDER — SODIUM CHLORIDE 9 MG/ML
1000 INJECTION INTRAMUSCULAR; INTRAVENOUS; SUBCUTANEOUS
Refills: 0 | Status: DISCONTINUED | OUTPATIENT
Start: 2021-11-29 | End: 2021-11-30

## 2021-11-29 RX ORDER — ZOLPIDEM TARTRATE 10 MG/1
1 TABLET ORAL
Qty: 0 | Refills: 0 | DISCHARGE

## 2021-11-29 RX ORDER — DILTIAZEM HCL 120 MG
240 CAPSULE, EXT RELEASE 24 HR ORAL DAILY
Refills: 0 | Status: DISCONTINUED | OUTPATIENT
Start: 2021-11-29 | End: 2021-11-29

## 2021-11-29 RX ORDER — PIPERACILLIN AND TAZOBACTAM 4; .5 G/20ML; G/20ML
3.38 INJECTION, POWDER, LYOPHILIZED, FOR SOLUTION INTRAVENOUS ONCE
Refills: 0 | Status: COMPLETED | OUTPATIENT
Start: 2021-11-29 | End: 2021-11-29

## 2021-11-29 RX ORDER — SODIUM CHLORIDE 9 MG/ML
500 INJECTION, SOLUTION INTRAVENOUS
Refills: 0 | Status: DISCONTINUED | OUTPATIENT
Start: 2021-11-29 | End: 2021-11-29

## 2021-11-29 RX ORDER — SODIUM CHLORIDE 9 MG/ML
1000 INJECTION INTRAMUSCULAR; INTRAVENOUS; SUBCUTANEOUS
Refills: 0 | Status: DISCONTINUED | OUTPATIENT
Start: 2021-11-29 | End: 2021-11-29

## 2021-11-29 RX ORDER — FINASTERIDE 5 MG/1
1 TABLET, FILM COATED ORAL
Qty: 0 | Refills: 0 | DISCHARGE

## 2021-11-29 RX ORDER — PIPERACILLIN AND TAZOBACTAM 4; .5 G/20ML; G/20ML
3.38 INJECTION, POWDER, LYOPHILIZED, FOR SOLUTION INTRAVENOUS EVERY 8 HOURS
Refills: 0 | Status: DISCONTINUED | OUTPATIENT
Start: 2021-11-29 | End: 2021-11-30

## 2021-11-29 RX ORDER — DILTIAZEM HCL 120 MG
60 CAPSULE, EXT RELEASE 24 HR ORAL EVERY 6 HOURS
Refills: 0 | Status: DISCONTINUED | OUTPATIENT
Start: 2021-11-29 | End: 2021-11-30

## 2021-11-29 RX ORDER — NEBIVOLOL HYDROCHLORIDE 5 MG/1
1 TABLET ORAL
Qty: 0 | Refills: 0 | DISCHARGE

## 2021-11-29 RX ADMIN — ZOLPIDEM TARTRATE 10 MILLIGRAM(S): 10 TABLET ORAL at 23:53

## 2021-11-29 RX ADMIN — PIPERACILLIN AND TAZOBACTAM 200 GRAM(S): 4; .5 INJECTION, POWDER, LYOPHILIZED, FOR SOLUTION INTRAVENOUS at 21:27

## 2021-11-29 RX ADMIN — SODIUM CHLORIDE 100 MILLILITER(S): 9 INJECTION INTRAMUSCULAR; INTRAVENOUS; SUBCUTANEOUS at 23:00

## 2021-11-29 RX ADMIN — Medication 60 MILLIGRAM(S): at 23:47

## 2021-11-29 NOTE — ASU PREOP CHECKLIST - SPO2 (%)
Addendum Note by Reyna Ching CMA at 06/05/17 10:23 AM     Author:  Reyna Ching CMA Service:  (none) Author Type:  Certified Medical Assistant     Filed:  06/05/17 10:23 AM Encounter Date:  6/5/2017 Status:  Signed     :  Reyna Ching CMA (Certified Medical Assistant)       Addended by: REYNA CHING on: 6/5/2017 10:23 AM        Modules accepted: Orders         Revision History        Date/Time User Provider Type Action    > 06/05/17 10:23 AM Reyna Ching CMA Certified Medical Assistant Sign    Attribution information within the note text is not available.            
97

## 2021-11-29 NOTE — H&P ADULT - NSHPLABSRESULTS_GEN_ALL_CORE
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 The esophagus and diverticulum were cleared of        all debris endoscopically. At the apex of the septum an injectionof 3%        hespan/MB was performed. Then a tunnel was extended on either side until        the mucscle was isolated. This was then divided. The edges were ablated        using the same knife and a soft coag setting. 5 TTS hemostatic clips        were sued to close the mucosotomy.       There was no signficant bleeding during the entire procedure.    < end of copied text >

## 2021-11-29 NOTE — H&P ADULT - NSHPPHYSICALEXAM_GEN_ALL_CORE
Vital Signs Last 24 Hrs  T(C): 36.7 (29 Nov 2021 21:21), Max: 37 (29 Nov 2021 18:31)  T(F): 98 (29 Nov 2021 21:21), Max: 98.6 (29 Nov 2021 18:31)  HR: 74 (29 Nov 2021 21:21) (63 - 75)  BP: 145/78 (29 Nov 2021 21:21) (140/78 - 153/92)  BP(mean): --  RR: 18 (29 Nov 2021 21:21) (12 - 18)  SpO2: 98% (29 Nov 2021 21:21) (96% - 98%)    PHYSICAL EXAM:  GENERAL: No Acute Distress  EYES: conjunctiva and sclera clear  ENMT: Moist mucous membranes   NECK: Supple  PULMONARY: Clear to auscultation bilaterally  CARDIAC: Regular rate and rhythm; No murmurs, rubs, or gallops  GASTROINTESTINAL: Abdomen soft, Nontender, Nondistended; Bowel sounds normal  EXTREMITIES:   No clubbing, cyanosis, or pedal edema  PSYCH: Normal Affect, Normal Behavior  NEUROLOGY:   - Mental status A&O x 3,  SKIN: No rashes or lesions  MUSCULOSKELETAL: No joint swelling

## 2021-11-29 NOTE — CONSULT NOTE ADULT - ASSESSMENT
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 Requiring observation post-procedurally.

## 2021-11-29 NOTE — H&P ADULT - ASSESSMENT
75 y/o M wtih AF, HTN, BPH s/p TURP, spinal stenosis/disk herniation, and asthma admitted after endoscopic myotomy for [place] Jamiesen Diverticulum.  Pt reports feeling well aside from some sore throat and hoarseness.  He reports no fever, chills, nausea, cough, or chest pain.

## 2021-11-29 NOTE — ASU PATIENT PROFILE, ADULT - DATE OF LAST VACCINATION
SUBJECTIVE:   45 y.o. female  complains of scant vaginal discharge for 5 days. Patient's last menstrual period was 2017..  She describes her periods as regular.  She describes other symptoms as erythema of vaginal area.  She is sexually active.  She uses IUD for contraception.    ROS:  GENERAL: No fever, chills, fatigability or weight loss.  VULVAR: No pain, no lesions and no itching.  VAGINAL: No relaxation, no itching, no discharge, no abnormal bleeding and no lesions.  ABDOMEN: No abdominal pain. Denies nausea. Denies vomiting. No diarrhea. No constipation  BREAST: Denies pain. No lumps. No discharge.  URINARY: No incontinence, no nocturia, no frequency and no dysuria.  CARDIOVASCULAR: No chest pain. No shortness of breath. No leg cramps.  NEUROLOGICAL: No headaches. No vision changes.        There were no vitals filed for this visit.      OBJECTIVE:   She appears well, afebrile.  Abdomen: benign, soft, nontender, no masses.  VULVA: Normal external female genitalia, normal urethra, normal urethral meatus  VAGINA:discharge: scant  CERVIXNormal  UTERUSnot examined  ADNEXAnot evaluated    Urine dipstick: not done.    ASSESSMENT:   nonspecific vaginitis    PLAN:   Follow up all cultures  Treatment: see orders for additional treatments  ROV prn if symptoms persist or worsen.    
29-Mar-2021
8

## 2021-11-29 NOTE — H&P ADULT - HISTORY OF PRESENT ILLNESS
77 y/o M wtih AF, HTN, BPH s/p TURP, spinal stenosis/disk herniation, and asthma admitted after endoscopic myotomy for [place] Jamiesen Diverticulum.  Pt reports feeling well aside from some sore throat and hoarseness.  He reports no fever, chills, nausea, cough, palpitations or chest pain.

## 2021-11-29 NOTE — H&P ADULT - PROBLEM SELECTOR PLAN 2
-- DO NOT REPLY / DO NOT REPLY ALL --  -- Message is from the [facility]--    COVID-19 Universal Screening: Negative    Provider paged via [employer] Documentation - The below message was copied and pasted from a [employer] page:    [phone] [facility] URGENT PATIENT CALLER NAME: SILVESTRE RE: SILVESTRE VIVEROS PATIENT 1981 PATIENT IS EXPERIENCING HEAVY VAGINAL BLEEDING SHE DOES NOT BELIEVE IT IS PART OF HER MENSTRUAL CYCLE. MCORD*        - continue diltiazem  - Holding aspirin

## 2021-11-29 NOTE — CONSULT NOTE ADULT - PROBLEM SELECTOR RECOMMENDATION 9
- Admit the patient to hospital [facility] for observation.   - Zosyn IV while in hospital, DC with levaquin 500 mg for a total of 7 days  - NPO today and esophagram in AM  - If esophagram does not show any leak, advance to clears for 3 days, followed by full liquids for three days.  - Self advance to soft foods in 1 week.

## 2021-11-29 NOTE — H&P ADULT - NSHPREVIEWOFSYSTEMS_GEN_ALL_CORE
REVIEW OF SYSTEMS    General:  Negative  Skin/Breast:  Negative  Ophthalmologic:  Negative  ENMT:  sore throat  Respiratory and Thorax:  Negative  Cardiovascular:  Negative  Gastrointestinal:  Negative  Genitourinary:  Negative  Musculoskeletal:  Negative  Neurological:  Negative  Psychiatric:  Negative  Hematology/Lymphatics:  Negative	  Endocrine:	  Negative  Allergic/Immunologic:	 Negative

## 2021-11-29 NOTE — CONSULT NOTE ADULT - SUBJECTIVE AND OBJECTIVE BOX
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  Underwent a Z-POEM procedure performed today as an outpatient.   Procedure was successfully performed. No complications intra- nor post-procedurally. Requiring admission for post-procedure observation.

## 2021-11-30 ENCOUNTER — TRANSCRIPTION ENCOUNTER (OUTPATIENT)
Age: 76
End: 2021-11-30

## 2021-11-30 VITALS
DIASTOLIC BLOOD PRESSURE: 74 MMHG | HEART RATE: 67 BPM | RESPIRATION RATE: 18 BRPM | SYSTOLIC BLOOD PRESSURE: 149 MMHG | OXYGEN SATURATION: 98 % | TEMPERATURE: 98 F

## 2021-11-30 LAB
ANION GAP SERPL CALC-SCNC: 14 MMOL/L — SIGNIFICANT CHANGE UP (ref 7–14)
BUN SERPL-MCNC: 14 MG/DL — SIGNIFICANT CHANGE UP (ref 7–23)
CALCIUM SERPL-MCNC: 9.6 MG/DL — SIGNIFICANT CHANGE UP (ref 8.4–10.5)
CHLORIDE SERPL-SCNC: 100 MMOL/L — SIGNIFICANT CHANGE UP (ref 98–107)
CO2 SERPL-SCNC: 23 MMOL/L — SIGNIFICANT CHANGE UP (ref 22–31)
CREAT SERPL-MCNC: 0.88 MG/DL — SIGNIFICANT CHANGE UP (ref 0.5–1.3)
GLUCOSE SERPL-MCNC: 145 MG/DL — HIGH (ref 70–99)
HCT VFR BLD CALC: 41.8 % — SIGNIFICANT CHANGE UP (ref 39–50)
HGB BLD-MCNC: 13.5 G/DL — SIGNIFICANT CHANGE UP (ref 13–17)
MAGNESIUM SERPL-MCNC: 2 MG/DL — SIGNIFICANT CHANGE UP (ref 1.6–2.6)
MCHC RBC-ENTMCNC: 30.2 PG — SIGNIFICANT CHANGE UP (ref 27–34)
MCHC RBC-ENTMCNC: 32.3 GM/DL — SIGNIFICANT CHANGE UP (ref 32–36)
MCV RBC AUTO: 93.5 FL — SIGNIFICANT CHANGE UP (ref 80–100)
NRBC # BLD: 0 /100 WBCS — SIGNIFICANT CHANGE UP
NRBC # FLD: 0 K/UL — SIGNIFICANT CHANGE UP
PHOSPHATE SERPL-MCNC: 3.2 MG/DL — SIGNIFICANT CHANGE UP (ref 2.5–4.5)
PLATELET # BLD AUTO: 226 K/UL — SIGNIFICANT CHANGE UP (ref 150–400)
POTASSIUM SERPL-MCNC: 4.4 MMOL/L — SIGNIFICANT CHANGE UP (ref 3.5–5.3)
POTASSIUM SERPL-SCNC: 4.4 MMOL/L — SIGNIFICANT CHANGE UP (ref 3.5–5.3)
RBC # BLD: 4.47 M/UL — SIGNIFICANT CHANGE UP (ref 4.2–5.8)
RBC # FLD: 12.7 % — SIGNIFICANT CHANGE UP (ref 10.3–14.5)
SODIUM SERPL-SCNC: 137 MMOL/L — SIGNIFICANT CHANGE UP (ref 135–145)
WBC # BLD: 13.07 K/UL — HIGH (ref 3.8–10.5)
WBC # FLD AUTO: 13.07 K/UL — HIGH (ref 3.8–10.5)

## 2021-11-30 PROCEDURE — 71045 X-RAY EXAM CHEST 1 VIEW: CPT | Mod: 26

## 2021-11-30 PROCEDURE — 74220 X-RAY XM ESOPHAGUS 1CNTRST: CPT | Mod: 26

## 2021-11-30 RX ORDER — ASPIRIN/CALCIUM CARB/MAGNESIUM 324 MG
1 TABLET ORAL
Qty: 0 | Refills: 0 | DISCHARGE

## 2021-11-30 RX ORDER — DILTIAZEM HCL 120 MG
1 CAPSULE, EXT RELEASE 24 HR ORAL
Qty: 0 | Refills: 0 | DISCHARGE

## 2021-11-30 RX ORDER — DILTIAZEM HCL 120 MG
2 CAPSULE, EXT RELEASE 24 HR ORAL
Qty: 30 | Refills: 0
Start: 2021-11-30 | End: 2021-12-14

## 2021-11-30 RX ADMIN — PIPERACILLIN AND TAZOBACTAM 25 GRAM(S): 4; .5 INJECTION, POWDER, LYOPHILIZED, FOR SOLUTION INTRAVENOUS at 05:32

## 2021-11-30 NOTE — DISCHARGE NOTE PROVIDER - CARE PROVIDER_API CALL
Nelda Urbina)  Internal Medicine  [address] [place] [address]  Phone: [phone]  Fax: [phone]  Follow Up Time:     Kaushik Raymundo)  Critical Care Medicine  [address] [place] [address], [address] [facility] [address]th [place] [address]  Phone: [phone]  Fax: [phone]  Follow Up Time:    Kaushik Raymundo)  Critical Care Medicine  [address] [place] [address], [address]th [place] [address]  Phone: [phone]  Fax: [phone]  Follow Up Time:     Aniket Hilliard)  Gastroenterology; Internal Medicine  Division of Gastroenterology - [address] [place] [address]  Phone: [phone]  Fax: [phone]  Follow Up Time:    Kaushik Raymundo)  Critical Care Medicine  [address] [place] [address], [address] (4th [place] [address]  Phone: [phone]  Fax: [phone]  Follow Up Time:     nAiket Hilliard)  Gastroenterology; Internal Medicine  Division of Gastroenterology - [address] [place] [address]  Phone: [phone]  Fax: [phone]  Follow Up Time:     Gregor Kaplan  Gastroenterology  [address] [address]  Phone: [phone]  Fax: [phone]  Follow Up Time:

## 2021-11-30 NOTE — DISCHARGE NOTE NURSING/CASE MANAGEMENT/SOCIAL WORK - NSDCPEFALRISK_GEN_ALL_CORE
For information on Fall & Injury Prevention, visit: [email or web address] OR  [email or web address] OR  https://www.cdc.gov/steadi/patient.html

## 2021-11-30 NOTE — DISCHARGE NOTE PROVIDER - CARE PROVIDERS DIRECT ADDRESSES
,DirectAddress_Unknown,DirectAddress_Unknown ,DirectAddress_Unknown,[email or web address] ,[email or web address],[email or web address]

## 2021-11-30 NOTE — DISCHARGE NOTE PROVIDER - NSDCCPCAREPLAN_GEN_ALL_CORE_FT
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 Requiring observation post-procedurally.   - Zosyn IV while in hospital, DC with levaquin 500 mg for  7 days  s/p esophagram ?????????????????????  - advance to clears for 3 days, followed by full liquids for three days.  - Self advance to soft foods in 1 week.  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 Requiring observation post-procedurally.   - Zosyn IV while in hospital, DC with levaquin 500 mg for  7 days  s/p esophagram No evidence of contrast extravasation to suggest leak  - advance to clears for 3 days, followed by full liquids for three days.  - Self advance to soft foods in 1 week.  -f/u with GI and your PCP on when to resume aspirin   follow up with GI in 1 week call for appointment

## 2021-11-30 NOTE — PROVIDER CONTACT NOTE (OTHER) - REASON
Patient is having a wet cough, and is complaining of his throat feeling sore and as if he cant swallow

## 2021-11-30 NOTE — PROVIDER CONTACT NOTE (OTHER) - BACKGROUND
Patient is A&O x4, admitted for a perioral endoscopic myotomy, hx of acquired diverticulum of esophagus, a-fib, insomnia, PNA, BPH, OA, and HTN

## 2021-11-30 NOTE — DISCHARGE NOTE PROVIDER - NSDCMRMEDTOKEN_GEN_ALL_CORE_FT
Ambien 10 mg oral tablet: 1 tab(s) orally once a day (at bedtime)  aspirin 81 mg oral delayed release tablet: 1 tab(s) orally once a day  Bystolic 10 mg oral tablet: 1 tab(s) orally once a day  dilTIAZem 240 mg/24 hours oral tablet, extended release: 1 tab(s) orally once a day  finasteride 5 mg oral tablet: 1 tab(s) orally once a day  Vitamin D3 25 mcg (1000 intl units) oral capsule: 1 cap(s) orally once a day   Ambien 10 mg oral tablet: 1 tab(s) orally once a day (at bedtime)  Bystolic 10 mg oral tablet: 1 tab(s) orally once a day  dilTIAZem 240 mg/24 hours oral tablet, extended release: 1 tab(s) orally once a day  finasteride 5 mg oral tablet: 1 tab(s) orally once a day  levoFLOXacin 500 mg oral tablet: 1 tab(s) orally every 24 hours   Vitamin D3 25 mcg (1000 intl units) oral capsule: 1 cap(s) orally once a day   Ambien 10 mg oral tablet: 1 tab(s) orally once a day (at bedtime)  amoxicillin-clavulanate 875 mg-125 mg oral tablet: 1 tab(s) orally every 12 hours   Bystolic 10 mg oral tablet: 1 tab(s) orally once a day  Dilt- mg/24 hours oral capsule, extended release: 2 cap(s) orally once a day   finasteride 5 mg oral tablet: 1 tab(s) orally once a day  Vitamin D3 25 mcg (1000 intl units) oral capsule: 1 cap(s) orally once a day

## 2021-11-30 NOTE — DISCHARGE NOTE NURSING/CASE MANAGEMENT/SOCIAL WORK - PATIENT PORTAL LINK FT
You can access the FollowMyHealth Patient Portal offered by [facility] by registering at the following website: [email or web address]. By joining [employer]’s FollowMyHealth portal, you will also be able to view your health information using other applications (apps) compatible with our system.

## 2021-11-30 NOTE — DISCHARGE NOTE PROVIDER - INSTRUCTIONS
- advance to clears for 3 days, followed by full liquids for three days.  - Self advance to soft foods in 1 week.       advance to clears for 3 days, followed by full liquids for three days.  - Self advance to soft foods in 1 week.

## 2021-11-30 NOTE — DISCHARGE NOTE PROVIDER - PROVIDER TOKENS
PROVIDER:[TOKEN:[[id number]]],PROVIDER:[TOKEN:[[id number]]] PROVIDER:[TOKEN:[[id number]]],PROVIDER:[TOKEN:[[id number]]] PROVIDER:[TOKEN:[[id number]]],PROVIDER:[TOKEN:[[id number]]],PROVIDER:[TOKEN:[[id number]]]

## 2021-11-30 NOTE — PROGRESS NOTE ADULT - SUBJECTIVE AND OBJECTIVE BOX
Interval Events:   sore throat resolved   No abdominal pain  No melena / bloody bm     Hospital Medications:  piperacillin/tazobactam IVPB.. 3.375 Gram(s) IV Intermittent every 8 hours  sodium chloride 0.9%. 1000 milliLiter(s) IV Continuous <Continuous>        ROS:   General:  No fevers, chills or night sweats  ENT:  No sore throat or dysphagia  CV:  No pain or palpitations  Resp:  No dyspnea, cough or  wheezing  GI:  as above  Skin:  No rash or edema  Neuro: no weakness   Hematologic: no bleeding  Musculoskeletal: no muscle pain or join pain  Psych: no agitation      PHYSICAL EXAM:   Vital Signs:  Vital Signs Last 24 Hrs  T(C): 36.7 (30 Nov 2021 09:45), Max: 37 (29 Nov 2021 18:31)  T(F): 98.1 (30 Nov 2021 09:45), Max: 98.6 (29 Nov 2021 18:31)  HR: 67 (30 Nov 2021 09:45) (63 - 86)  BP: 149/74 (30 Nov 2021 09:45) (140/78 - 153/92)  BP(mean): --  RR: 18 (30 Nov 2021 09:45) (12 - 18)  SpO2: 98% (30 Nov 2021 09:45) (91% - 99%)  Daily Height in cm: 175.26 (29 Nov 2021 14:52)    Daily     GENERAL:  NAD, Appears stated age  HEENT:  NC/AT,  conjunctivae clear and pink, sclera -anicteric  CHEST:  Normal Effort, Breath sounds clear  HEART:  RRR, S1 + S2, no murmurs  ABDOMEN:  Soft, non-tender, non-distended, normoactive bowel sounds,  no masses  EXTREMITIES:  no cyanosis or edema  SKIN:  Warm & Dry. No rash or erythema  NEURO:  Alert, oriented, no focal deficit    LABS:                        13.5   13.07 )-----------( 226      ( 30 Nov 2021 07:37 )             41.8     Mean Cell Volume: 93.5 fL (11-30-21 @ 07:37)    11-30    137  |  100  |  14  ----------------------------<  145<H>  4.4   |  23  |  0.88    Ca    9.6      30 Nov 2021 07:37  Phos  3.2     11-30  Mg     2.00     11-30                                    13.5   13.07 )-----------( 226      ( 30 Nov 2021 07:37 )             41.8       Imaging:    < from: Xray Esophagram Single Contrast (11.30.21 @ 09:01) >    EXAM:  XR ESOPH SNGL CON STUDY        PROCEDURE DATE:  Nov 30 2021         INTERPRETATION:  CLINICAL INFORMATION: Status post endoscopic myotomy for Blair Raquel diverticulum.    TECHNIQUE: An esophagram was performed under fluoroscopy utilizing barium as the contrast agent and multiple spot fluoroscopic images were taken in the AP, oblique and lateral projections.    COMPARISON: Modified barium swallow 9/30/2021.    FLUORO TIME: 1 minute 19 seconds.    FINDINGS:    Preliminary  radiograph of the chest demonstrates multiple hemostatic clips in the region of proximal esophagus. The lungs are clear.    The patient swallowed water soluble contrast without difficulty. Contrast passes freely into the stomach.    No evidence of contrast extravasation to suggest leak at the region of surgical site. Persistent filling of the Blair Raquel diverticulum measuring approximately 2.9 x 2.4 cm.    IMPRESSION:    No evidence of contrast extravasation to suggest leak    Persistent Blair Raquel diverticulum.    --- End of Report ---    < end of copied text >  < from: Upper Endoscopy (11.29.21 @ 15:40) >  Findings:       There was a large crycopharyngeal diverticulum consistent with a Blair        Jamiesen Diverticulum. The esophagus and diverticulum were cleared of        all debris endoscopically. At the apex of the septum an injectionof 3%        hespan/MB was performed. Then a tunnel was extended on either side until        the mucscle was isolated. This was then divided. The edges were ablated        using the same knife and a soft coag setting. 5 TTS hemostatic clips        were sued to close the mucosotomy.       There was no signficant bleeding during the entire procedure.                                                                                   Impression:          - KJ diverticulum.                       - Successful myotomy.  Recommendation:      - Admit the patient to hospital [facility] for observation.                       - Zosyn IV while in hospital, DC with levaquin 500 mg                        for a total of 7 days                       - NPO today and esophagram in AM                       - If esophagram does not show any leak, advance to                        clears for 3 days, followed by full liquids for three                        days.                       - Self advance to soft foods in 1week    < end of copied text >  
Date of Service  : 11-30-21 @ 13:39    INTERVAL HPI/OVERNIGHT EVENTS: I am fine so want to go home. Had 4 loose stools this morning.   Vital Signs Last 24 Hrs  T(C): 36.7 (30 Nov 2021 09:45), Max: 37 (29 Nov 2021 18:31)  T(F): 98.1 (30 Nov 2021 09:45), Max: 98.6 (29 Nov 2021 18:31)  HR: 67 (30 Nov 2021 09:45) (63 - 86)  BP: 149/74 (30 Nov 2021 09:45) (140/78 - 153/92)  BP(mean): --  RR: 18 (30 Nov 2021 09:45) (12 - 18)  SpO2: 98% (30 Nov 2021 09:45) (91% - 99%)  I&O's Summary    MEDICATIONS  (STANDING):  piperacillin/tazobactam IVPB.. 3.375 Gram(s) IV Intermittent every 8 hours  sodium chloride 0.9%. 1000 milliLiter(s) (100 mL/Hr) IV Continuous <Continuous>    MEDICATIONS  (PRN):    LABS:                        13.5   13.07 )-----------( 226      ( 30 Nov 2021 07:37 )             41.8     11-30    137  |  100  |  14  ----------------------------<  145<H>  4.4   |  23  |  0.88    Ca    9.6      30 Nov 2021 07:37  Phos  3.2     11-30  Mg     2.00     11-30          CAPILLARY BLOOD GLUCOSE              REVIEW OF SYSTEMS:  CONSTITUTIONAL: No fever, weight loss, or fatigue  EYES: No eye pain, visual disturbances, or discharge  ENMT:  No difficulty hearing, tinnitus, vertigo; No sinus or throat pain  NECK: No pain or stiffness  RESPIRATORY: No cough, wheezing, chills or hemoptysis; No shortness of breath  CARDIOVASCULAR: No chest pain, palpitations, dizziness, or leg swelling  GASTROINTESTINAL: No abdominal or epigastric pain. No nausea, vomiting, or hematemesis; No diarrhea or constipation. No melena or hematochezia.  GENITOURINARY: No dysuria, frequency, hematuria, or incontinence  NEUROLOGICAL: No headaches, memory loss, loss of strength, numbness, or tremors      Consultant(s) Notes Reviewed:  [x ] YES  [ ] NO    PHYSICAL EXAM:  GENERAL: NAD, well-groomed, well-developed, not in any distress ,  HEAD:  Atraumatic, Normocephalic  EYES: EOMI, PERRLA, conjunctiva and sclera clear  ENMT: No tonsillar erythema, exudates, or enlargement; Moist mucous membranes, Good dentition, No lesions  NECK: Supple, No JVD, Normal thyroid  NERVOUS SYSTEM:  Alert & Oriented X3, No focal deficit   CHEST/LUNG: Good air entry bilateral with no  rales, rhonchi, wheezing, or rubs  HEART: Regular rate and rhythm; No murmurs, rubs, or gallops  ABDOMEN: Soft, Nontender, Nondistended; Bowel sounds present  EXTREMITIES:  2+ Peripheral Pulses, No clubbing, cyanosis, or edema      Care Discussed with Consultants/Other Providers [ x] YES  [ ] NO

## 2021-11-30 NOTE — DISCHARGE NOTE PROVIDER - HOSPITAL COURSE
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 Requiring observation post-procedurally.     - Zosyn IV while in hospital, DC with levaquin 500 mg for a total of 7 days  s/p esophagram ?????????????????  - advance to clears for 3 days, followed by full liquids for three days.  - Self advance to soft foods in 1 week.         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 Requiring observation post-procedurally.     - Zosyn IV while in hospital, DC with levaquin 500 mg for a total of 7 days  s/p esophagram No evidence of contrast extravasation to suggest leak  - advance to clears for 3 days, followed by full liquids for three days.  - Self advance to soft foods in 1 week.      Discussed with attending ready for discharge

## 2021-11-30 NOTE — PROGRESS NOTE ADULT - ASSESSMENT
Impression:    # KJ diverticulum s/p EGD with myotomy on 11/29 - esophagram does not show any leak,    Recommendation:    - Okay to discharge patient home today with Levaquin 500 mg for a total of 7 days  - Advance to clears for 3 days, followed by full liquids for three days.  - Self advance to soft foods in 1week    Jaxon Massey MD  Gastroenterology Fellow  Pager: [phone]  Please call answering service [phone] / on-call GI fellow after 5pm and before 8am, and on weekends.

## 2021-11-30 NOTE — PROGRESS NOTE ADULT - ASSESSMENT
75 y/o M wtih AF, HTN, BPH s/p TURP, spinal stenosis/disk herniation, and asthma admitted after endoscopic myotomy for [place] Jamiesen Diverticulum.  Pt reports feeling well aside from some sore throat and hoarseness.  He reports no fever, chills, nausea, cough, or chest pain.         Problem/Plan - 1:  ·  Problem: Esophageal diverticulum.   ·  Plan: S/P Myotomy .  Reviewed GI consult note.  Case d/w GI fellow   - esophagram no leak.  - Okay to discharge patient home today with Levaquin 500 mg for a total of 7 days  - Advance to clears for 3 days, followed by full liquids for three days.  - Self advance to soft foods in 1week.      Problem/Plan - 2:  ·  Problem: Paroxysmal atrial fibrillation.   ·  Plan: - continue diltiazem  - Holding aspirin.     Problem/Plan - 3:  ·  Problem: Essential hypertension.   ·  Plan: - Restart Bystolic in am after esophagram.     Problem/Plan - 4:  ·  Problem: BPH (benign prostatic hyperplasia).   ·  Plan: - holding Proscar for now.    GI wants him discharged today . They feel diarrhea likely from Abxs. Pt very adamant to leave now . Said I will feel better at home.  75 y/o M wtih AF, HTN, BPH s/p TURP, spinal stenosis/disk herniation, and asthma admitted after endoscopic myotomy for Blair Jamiesen Diverticulum.  Pt reports feeling well aside from some sore throat and hoarseness.  He reports no fever, chills, nausea, cough, or chest pain.         Problem/Plan - 1:  ·  Problem: Esophageal diverticulum.   ·  Plan: S/P Myotomy .  Reviewed GI consult note.  Case d/w GI fellow   - esophagram no leak.  - Okay to discharge patient home today with Levaquin 500 mg for a total of 7 days  - Advance to clears for 3 days, followed by full liquids for three days.  - Self advance to soft foods in 1week.      Problem/Plan - 2:  ·  Problem: Paroxysmal atrial fibrillation.   ·  Plan: - continue diltiazem  - Holding aspirin.     Problem/Plan - 3:  ·  Problem: Essential hypertension.   ·  Plan: - Restart Bystolic in am after esophagram.     Problem/Plan - 4:  ·  Problem: BPH (benign prostatic hyperplasia).   ·  Plan: - holding Proscar for now.    GI wants him discharged today . They feel diarrhea likely from Abxs. Pt very adamant to leave now . Said I will feel better at home. Advised close follow up with GI and if condition worsens to go to ER.   At time of discharge pt's wife insisted that the pt not be discharged on  Levaquin as was recommended by GI. ACP called GI and eventually DGI service changed to PO Augmentin . Pts wife aware of  side  affects including higher incidence of diarrhea but expressed preference for Augmentin over Levaquin. Her Augmentin was changed to Solution and Cardizem to crushable form .

## 2021-12-03 ENCOUNTER — NON-APPOINTMENT (OUTPATIENT)
Age: 76
End: 2021-12-03

## 2022-01-13 ENCOUNTER — APPOINTMENT (OUTPATIENT)
Dept: GASTROENTEROLOGY | Facility: CLINIC | Age: 77
End: 2022-01-13
Payer: COMMERCIAL

## 2022-01-13 VITALS
HEIGHT: 68 IN | DIASTOLIC BLOOD PRESSURE: 75 MMHG | WEIGHT: 168 LBS | BODY MASS INDEX: 25.46 KG/M2 | SYSTOLIC BLOOD PRESSURE: 128 MMHG | HEART RATE: 97 BPM | OXYGEN SATURATION: 96 % | TEMPERATURE: 97.7 F

## 2022-01-13 PROCEDURE — 99213 OFFICE O/P EST LOW 20 MIN: CPT

## 2022-01-13 NOTE — HISTORY OF PRESENT ILLNESS
[FreeTextEntry1] : 76 with KJ diverticulum and he is now s/p myotomy. He had an initial improvement as he was not tolerating most things and had regurgitation of his saliva. However he is not perfect and still says that occasionally and predictably with some pills he is getting the feeling that they're getting stuck. \par He brought a CD of a repeat esophagram for review. Prior diverticulum was 405 cm and had a long septum with significant delay of oral contrast. On recent esophagram the outpouching is still visible, the septum is visibly smaller (about 1.5 cm) and there is little to no delay of contrast. He denies neck pain

## 2022-01-13 NOTE — ASSESSMENT
[FreeTextEntry1] : The patient is disappointed that the result is not perfect at the moment. There may be some more improvement as he continues to heal. I have explained to patient and wife that perhaps we can consider a repeating the EGD for diagnostic purposes, and/or possibly cutting an residual septal tissue which may be causing a hindrance. The alternative is to consider a surgical diverticulectomy. I have discussed the way this procedure is done so he can be aware of the options.

## 2022-01-13 NOTE — PHYSICAL EXAM
[General Appearance - Alert] : alert [General Appearance - In No Acute Distress] : in no acute distress [General Appearance - Well Nourished] : well nourished [General Appearance - Well Developed] : well developed [Neck Appearance] : the appearance of the neck was normal [Neck Cervical Mass (___cm)] : no neck mass was observed [Jugular Venous Distention Increased] : there was no jugular-venous distention [] : no respiratory distress

## 2022-02-09 NOTE — HISTORY OF PRESENT ILLNESS
[Home] : at home, [unfilled] , at the time of the visit. [Medical Office: ([facility])___] : at the medical office located in  [Verbal consent obtained from patient] : the patient, [unfilled] [FreeTextEntry1] : 76 referred for Zenker's diverticulum.He has chronic regurgitation and microaspiration. He's had trouble swallowing of food and liquids that’s been worse over the last few months. Esophagram identifies a 3-4 cm diverticulum c/w a possible Zenker's. He denies weight loss. He denies odynophagia. No change in bowel habits. No abdominal pain or chest pain. No esophageal spasm. No esophageal dysphagia.

## 2022-02-09 NOTE — ASSESSMENT
[FreeTextEntry1] : I have discussed approach for treating Zenker's or KJ diverticulum endoscopically. We use a tunneled Z-POEM approach. It is best to be monitored in the hospital. There is a very small risk of leak which if identified next day on esophagram can be treated.

## 2022-02-10 ENCOUNTER — APPOINTMENT (OUTPATIENT)
Dept: GASTROENTEROLOGY | Facility: CLINIC | Age: 77
End: 2022-02-10
Payer: MEDICARE

## 2022-02-10 DIAGNOSIS — K22.5 DIVERTICULUM OF ESOPHAGUS, ACQUIRED: ICD-10-CM

## 2022-02-10 PROCEDURE — 99442: CPT

## 2022-02-15 PROBLEM — K22.5 ZENKER'S DIVERTICULUM: Status: ACTIVE | Noted: 2021-11-01

## 2022-02-15 NOTE — HISTORY OF PRESENT ILLNESS
[Home] : at home, [unfilled] , at the time of the visit. [Medical Office: ([facility])___] : at the medical office located in  [Verbal consent obtained from patient] : the patient, [unfilled] [FreeTextEntry1] : 76 with h/o Zenker's diverticulum on report, but on endoscopy the finding was more consistent with a KJ diverticulum. \par He underwent a tunneled myotomy successfully. He reports improvement but he still has symptoms throughout the week. He had an esophagram 3 -4 weeks after procedure which showed some residual diverticulum. He denies chocking. He still has some dysphagia to pills and larger roughage. No neck pain or swelling.

## 2022-02-15 NOTE — ASSESSMENT
[FreeTextEntry1] : Plan for endoscopy to further assess whether there is a significant diverticulum and whether this is amenable to any further treatments.

## 2022-03-13 RX ORDER — SODIUM CHLORIDE 9 MG/ML
500 INJECTION, SOLUTION INTRAVENOUS
Refills: 0 | Status: DISCONTINUED | OUTPATIENT
Start: 2022-03-14 | End: 2022-03-28

## 2022-03-14 ENCOUNTER — APPOINTMENT (OUTPATIENT)
Dept: GASTROENTEROLOGY | Facility: HOSPITAL | Age: 77
End: 2022-03-14

## 2022-03-14 ENCOUNTER — RESULT REVIEW (OUTPATIENT)
Age: 77
End: 2022-03-14

## 2022-03-14 ENCOUNTER — OUTPATIENT (OUTPATIENT)
Dept: OUTPATIENT SERVICES | Facility: HOSPITAL | Age: 77
LOS: 1 days | Discharge: HOME | End: 2022-03-14
Payer: MEDICARE

## 2022-03-14 VITALS
DIASTOLIC BLOOD PRESSURE: 74 MMHG | OXYGEN SATURATION: 98 % | SYSTOLIC BLOOD PRESSURE: 134 MMHG | HEART RATE: 87 BPM | RESPIRATION RATE: 14 BRPM | WEIGHT: 166.01 LBS | HEIGHT: 68 IN | TEMPERATURE: 98 F

## 2022-03-14 VITALS
SYSTOLIC BLOOD PRESSURE: 127 MMHG | DIASTOLIC BLOOD PRESSURE: 78 MMHG | RESPIRATION RATE: 19 BRPM | HEART RATE: 80 BPM | OXYGEN SATURATION: 99 %

## 2022-03-14 DIAGNOSIS — K20.90 ESOPHAGITIS, UNSPECIFIED WITHOUT BLEEDING: ICD-10-CM

## 2022-03-14 DIAGNOSIS — K22.5 DIVERTICULUM OF ESOPHAGUS, ACQUIRED: ICD-10-CM

## 2022-03-14 DIAGNOSIS — Z96.653 PRESENCE OF ARTIFICIAL KNEE JOINT, BILATERAL: Chronic | ICD-10-CM

## 2022-03-14 DIAGNOSIS — Z90.79 ACQUIRED ABSENCE OF OTHER GENITAL ORGAN(S): Chronic | ICD-10-CM

## 2022-03-14 LAB — SARS-COV-2 N GENE NPH QL NAA+PROBE: NOT DETECTED

## 2022-03-14 PROCEDURE — 88305 TISSUE EXAM BY PATHOLOGIST: CPT | Mod: 26

## 2022-03-14 PROCEDURE — 43245 EGD DILATE STRICTURE: CPT

## 2022-03-14 PROCEDURE — 43239 EGD BIOPSY SINGLE/MULTIPLE: CPT | Mod: 59

## 2022-03-14 DEVICE — CATH ESOPH/PYLO/COL DIL15-18MM: Type: IMPLANTABLE DEVICE | Status: FUNCTIONAL

## 2022-03-14 RX ORDER — PANTOPRAZOLE 40 MG/1
40 TABLET, DELAYED RELEASE ORAL TWICE DAILY
Qty: 120 | Refills: 1 | Status: ACTIVE | COMMUNITY
Start: 2022-03-14

## 2022-03-14 NOTE — ASU PATIENT PROFILE, ADULT - FALL HARM RISK - UNIVERSAL INTERVENTIONS
Bed in lowest position, wheels locked, appropriate side rails in place/Call bell, personal items and telephone in reach/Instruct patient to call for assistance before getting out of bed or chair/Non-slip footwear when patient is out of bed/[place] to call system/Physically safe environment - no spills, clutter or unnecessary equipment/Purposeful Proactive Rounding/Room/bathroom lighting operational, light cord in reach

## 2022-03-17 LAB — SURGICAL PATHOLOGY STUDY: SIGNIFICANT CHANGE UP

## 2022-05-27 ENCOUNTER — APPOINTMENT (OUTPATIENT)
Dept: UROLOGY | Facility: CLINIC | Age: 77
End: 2022-05-27

## 2022-06-02 ENCOUNTER — RESULT REVIEW (OUTPATIENT)
Age: 77
End: 2022-06-02

## 2022-06-02 ENCOUNTER — APPOINTMENT (OUTPATIENT)
Dept: UROLOGY | Facility: CLINIC | Age: 77
End: 2022-06-02
Payer: MEDICARE

## 2022-06-02 DIAGNOSIS — K59.00 CONSTIPATION, UNSPECIFIED: ICD-10-CM

## 2022-06-02 DIAGNOSIS — Z00.00 ENCOUNTER FOR GENERAL ADULT MEDICAL EXAMINATION W/OUT ABNORMAL FINDINGS: ICD-10-CM

## 2022-06-02 PROCEDURE — 51798 US URINE CAPACITY MEASURE: CPT

## 2022-06-02 PROCEDURE — 99204 OFFICE O/P NEW MOD 45 MIN: CPT

## 2022-06-04 ENCOUNTER — APPOINTMENT (OUTPATIENT)
Dept: CT IMAGING | Facility: IMAGING CENTER | Age: 77
End: 2022-06-04
Payer: MEDICARE

## 2022-06-04 ENCOUNTER — OUTPATIENT (OUTPATIENT)
Dept: OUTPATIENT SERVICES | Facility: HOSPITAL | Age: 77
LOS: 1 days | End: 2022-06-04
Payer: MEDICARE

## 2022-06-04 DIAGNOSIS — K59.00 CONSTIPATION, UNSPECIFIED: ICD-10-CM

## 2022-06-04 DIAGNOSIS — Z96.653 PRESENCE OF ARTIFICIAL KNEE JOINT, BILATERAL: Chronic | ICD-10-CM

## 2022-06-04 DIAGNOSIS — Z00.00 ENCOUNTER FOR GENERAL ADULT MEDICAL EXAMINATION WITHOUT ABNORMAL FINDINGS: ICD-10-CM

## 2022-06-04 DIAGNOSIS — N39.3 STRESS INCONTINENCE (FEMALE) (MALE): ICD-10-CM

## 2022-06-04 DIAGNOSIS — Z90.79 ACQUIRED ABSENCE OF OTHER GENITAL ORGAN(S): Chronic | ICD-10-CM

## 2022-06-04 PROCEDURE — 74178 CT ABD&PLV WO CNTR FLWD CNTR: CPT | Mod: 26,ME

## 2022-06-04 PROCEDURE — G1004: CPT

## 2022-06-04 PROCEDURE — 74178 CT ABD&PLV WO CNTR FLWD CNTR: CPT | Mod: ME

## 2022-06-05 LAB
ANION GAP SERPL CALC-SCNC: 13 MMOL/L
APPEARANCE: ABNORMAL
BACTERIA UR CULT: ABNORMAL
BACTERIA: ABNORMAL
BILIRUBIN URINE: NEGATIVE
BLOOD URINE: NORMAL
BUN SERPL-MCNC: 22 MG/DL
CALCIUM SERPL-MCNC: 10 MG/DL
CHLORIDE SERPL-SCNC: 102 MMOL/L
CO2 SERPL-SCNC: 24 MMOL/L
COLOR: YELLOW
CREAT SERPL-MCNC: 1.02 MG/DL
EGFR: 77 ML/MIN/1.73M2
GLUCOSE QUALITATIVE U: NEGATIVE
GLUCOSE SERPL-MCNC: 88 MG/DL
HYALINE CASTS: 9 /LPF
KETONES URINE: NEGATIVE
LEUKOCYTE ESTERASE URINE: ABNORMAL
MICROSCOPIC-UA: NORMAL
NITRITE URINE: NEGATIVE
PH URINE: 6
POTASSIUM SERPL-SCNC: 4.3 MMOL/L
PROTEIN URINE: ABNORMAL
PSA FREE FLD-MCNC: NORMAL %
PSA FREE SERPL-MCNC: <0.01 NG/ML
PSA SERPL-MCNC: 0.06 NG/ML
RED BLOOD CELLS URINE: 8 /HPF
SODIUM SERPL-SCNC: 139 MMOL/L
SPECIFIC GRAVITY URINE: 1.02
SQUAMOUS EPITHELIAL CELLS: 1 /HPF
UROBILINOGEN URINE: ABNORMAL
WHITE BLOOD CELLS URINE: 142 /HPF

## 2022-06-05 RX ORDER — SULFAMETHOXAZOLE AND TRIMETHOPRIM 800; 160 MG/1; MG/1
800-160 TABLET ORAL
Qty: 10 | Refills: 0 | Status: ACTIVE | COMMUNITY
Start: 2022-06-05 | End: 1900-01-01

## 2022-06-07 ENCOUNTER — APPOINTMENT (OUTPATIENT)
Dept: UROLOGY | Facility: CLINIC | Age: 77
End: 2022-06-07
Payer: MEDICARE

## 2022-06-07 ENCOUNTER — TRANSCRIPTION ENCOUNTER (OUTPATIENT)
Age: 77
End: 2022-06-07

## 2022-06-07 VITALS
HEART RATE: 65 BPM | SYSTOLIC BLOOD PRESSURE: 136 MMHG | BODY MASS INDEX: 24.73 KG/M2 | WEIGHT: 167 LBS | DIASTOLIC BLOOD PRESSURE: 74 MMHG | HEIGHT: 69 IN | RESPIRATION RATE: 17 BRPM

## 2022-06-07 DIAGNOSIS — N40.1 BENIGN PROSTATIC HYPERPLASIA WITH LOWER URINARY TRACT SYMPMS: ICD-10-CM

## 2022-06-07 DIAGNOSIS — N39.3 STRESS INCONTINENCE (FEMALE) (MALE): ICD-10-CM

## 2022-06-07 DIAGNOSIS — N35.919 UNSPECIFIED URETHRAL STRICTURE, MALE, UNSPECIFIED SITE: ICD-10-CM

## 2022-06-07 DIAGNOSIS — N13.8 BENIGN PROSTATIC HYPERPLASIA WITH LOWER URINARY TRACT SYMPMS: ICD-10-CM

## 2022-06-07 PROCEDURE — 99214 OFFICE O/P EST MOD 30 MIN: CPT

## 2022-06-07 NOTE — HISTORY OF PRESENT ILLNESS
[FreeTextEntry1] : Patient is a 76 year-old gentleman with a history of BPH status post previous TURP/ GreenLight laser vaporization of prostate and stress incontinence. The patient is referred for evaluation of his condition by Dr Velasco. He previously underwent Greenlight in 2006 and TURP with Dr. Carlos Prado in 2007. The patient developed urinary incontinence after surgery. He notes he wears 3 pads per day.  He failed Kegel's exercise.  When he travels far he will use penile clamp. He reports performing CIC 3 times a day in order to empty his bladder and to try to prevent incontinence. He does void in between cathing but does not feel he empties completely at times.  Stream is variable.\par In 2011 he had cystoscopy with Dr Velasco showing bulbar urethral stricture.\par

## 2022-06-07 NOTE — ASSESSMENT
[FreeTextEntry1] : Patient is a 75 yo M who presents for incontinence, urethral stricture.\par \par D/w pt that he has very complex urologic history - would need to assess bladder contractilty with UDS, would need to assess urethral stricture with RUG/VCUG after period of urethral rest for >6 wks - which would require pt to have SPT placement, and if stricture found would need repair/reconstruction and period of healing for months prior to consideration of incontinence procedure such as sling or AUS.\par \par After discussion, he is very hesitant to proceed with SPT and urethral rest.  However willing to perform UDS.\par

## 2022-06-08 NOTE — HISTORY OF PRESENT ILLNESS
[FreeTextEntry1] : Please refer to URO Consult note \par \par \par Patient has long history of urinary incontinence after TURP/PVP by Dr. Carlos Prado in 2007.\par He continues to perform CIC 3 times daily.  He has incontinence.  He wears 3 pads per day.\par PVR today was 75 cc.\par He recently developed double pneumonia with gram-negative ravin sepsis\par Urine culture.  PSA.  CT scan.  For stones

## 2022-06-08 NOTE — ADDENDUM
[FreeTextEntry1] : Entered by Jesusita Figueroa, acting as scribe for Dr. Shaun Velasco.\par \par The documentation recorded by the scribe accurately reflects the service I personally performed and the decisions made by me.

## 2022-06-08 NOTE — LETTER BODY
No office visit. CL order only. Pt broke right lens, needs replacement.    Contact Lens Billing  V-Code:  - Scleral Cover Shell  Final Contact Lens Rx       Brand Base Curve Diameter Sphere Lens    Right BostonSight 8.0 19.0 -162.00 [id number]    Left BostonSight 8.0 19.0 -1.00 [id number]         # of units: 1 right lens  Price per Unit: $200    This patient requires contact lenses that are medically necessary for either improvement in vision over spectacles, support of the ocular surface, or other therapeutic benefit. These are not cosmetic contact lenses.     Encounter Diagnoses   Name Primary?     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      
[FreeTextEntry1] : Kaushik Raymundo MD\par [address],\par [place] [address]\par [phone]\par \par Dear Dr. Raymundo, \par \par Reason for Visit: BPH. Stress incontinence. \par \par This is a 76 year-old gentleman with a history of BPH status post previous TURP/ GreenLight laser vaporization of prostate and stress incontinence. The patient is referred for evaluation of his condition. He previously underwent TURP with Dr. Carlos Prado in 2007. The patient developed urinary incontinence after surgery. He notes he wears 3 pads per day. He reports performing CIC 3 times a day. He also reports recently developed double pneumonia with gram negative sepsis. He expresses concern about renal stones. The patient denies any gross hematuria or dysuria or urinary incontinence. The patient denies any interference of function. All other review of systems are negative. Past medical history, family history and social history were inquired and were noncontributory to current condition. The patient drinks socially.  Medications and allergies were reviewed. He has no known allergies to medication. \par \par On examination, the patient is a older appearing gentleman in no acute distress. He is alert and oriented follows commands. He has normal mood and affect. He is normocephalic. Oral no thrush. Neck is supple. Respirations are unlabored. His abdomen is soft and nontender. Liver is nonpalpable. Bladder is nonpalpable. No CVA tenderness. Neurologically he is grossly intact. No peripheral edema. Skin without gross abnormality. He has normal male external genitalia. Normal meatus. Bilateral testes are descended intrascrotally and normal to palpation. On rectal examination, there is normal sphincter tone. The prostate is clinically benign without focal induration or nodularity.\par \par Post-void residual on bladder scan today was 75 cc. \par \par Assessment: BPH. Stress incontinence. \par \par I counseled the patient. In terms of his BPH, His PVR today was 75 cc. I recommended the patient continue performing CIC 3 times day. I also recommended the patient undergo a CT of abdomen and pelvis to evaluate for any stone burden or hydronephrosis. I counseled the patient regarding the procedure.  In terms of his urinary incontinence, he suggested an artificial urinary sphincter.  I recommend he see Dr. Jim Maya.  The risks and benefits were discussed. Alternatives were given. I answered the patient questions. The patient will take the necessary preparations for the procedure. He will obtain PSA, BMP, urinalysis and urine culture. The patient will follow-up as directed and will contact me with any questions or concerns. Thank you for the opportunity to participate in the care of this patient. I'll keep you updated on his progress.\par \par Plan: CT abdomen and pelvis. BMP. PSA. Urinalysis. Urine culture. Follow up as directed.  Referral to Dr. Jim Maya.

## 2022-06-20 ENCOUNTER — APPOINTMENT (OUTPATIENT)
Age: 77
End: 2022-06-20

## 2022-06-20 ENCOUNTER — TRANSCRIPTION ENCOUNTER (OUTPATIENT)
Age: 77
End: 2022-06-20

## 2022-06-20 ENCOUNTER — NON-APPOINTMENT (OUTPATIENT)
Age: 77
End: 2022-06-20

## 2022-06-20 LAB
APPEARANCE: ABNORMAL
BACTERIA: ABNORMAL
BILIRUBIN URINE: NEGATIVE
BLOOD URINE: NEGATIVE
COLOR: YELLOW
GLUCOSE QUALITATIVE U: NEGATIVE
HYALINE CASTS: 0 /LPF
KETONES URINE: NEGATIVE
LEUKOCYTE ESTERASE URINE: ABNORMAL
MICROSCOPIC-UA: NORMAL
NITRITE URINE: NEGATIVE
PH URINE: 6
PROTEIN URINE: NORMAL
RED BLOOD CELLS URINE: 2 /HPF
SPECIFIC GRAVITY URINE: 1.02
SQUAMOUS EPITHELIAL CELLS: 2 /HPF
URINE COMMENTS: NORMAL
UROBILINOGEN URINE: NORMAL
WHITE BLOOD CELLS URINE: 22 /HPF

## 2022-07-15 ENCOUNTER — APPOINTMENT (OUTPATIENT)
Dept: UROLOGY | Facility: CLINIC | Age: 77
End: 2022-07-15

## 2022-11-29 NOTE — CHART NOTE - NSCHARTNOTEFT_GEN_A_CORE
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 The esophagus and diverticulum were cleared of all debris endoscopically. At the apex of the septum an injection of 3% hespan/MB was performed. Then a tunnel was extended on either side until the muscle was isolated. This was then divided. The edges were ablated using the same knife and a soft coag setting. 5 TTS hemostatic clips were sued to close the mucosotomy.   There was no significant bleeding during the entire procedure.     RECOMMENDATIONS:  - Admit the patient to hospital [facility] for observation.   - Zosyn IV while in hospital, DC with levaquin 500 mg for a total of 7 days  - NPO today and esophagram in AM  - If esophagram does not show any leak, advance to clears for 3 days, followed by full liquids for three days.  - Self advance to soft foods in 1 week
Notified by [facility] unit receptionist around 7:45PM that Mr. Gibbs had been discharged from the hospital however, wife Yasmin calling, upset wanting to discuss medications.   I contacted Yasmin's cell phone [phone] however, three unsuccessful attempts.   Then unit receptionist notified me shortly after to call their home phone @ [phone]. I was able to reach Yasmin whose concern was that her  Juan was sent home with medications specifically Cardizem  [2 capsules per day] and Augmentin however, unable to crush capsules and the Augmentin was also difficult for the family to crush. Discussed with Yasmin can send medications to 24 [address] [facility] nearby, she was in agreement.     Cardizem 120 q 12 hours and Augmentin solution form sent to [facility] in [address] [place] [address] [phone] then discussed with wife regarding new medications and change in formulation sent. Wife agreeable to picking up medications and advised wife if any concerns overnight regarding his health to please seek medical attention immediately and bring to ED. If any issues with medications, wife given call back [phone].       Discussed with Dr. Sheryl COHEN [facility]   P. [phone]
called by speech and swallow requesting change cine to esophagram, cine will not r/o leak, order changed
discussed with GI service   patient cleared for discharge home by GI who are aware of the diarrhea   esophagram negative for leak  diarrhea likely post procedure and secondary to antibiotics per GI    patient and wife extremely anxious for discharge today    discussed with covering ACP   discussed with patient's wife over the phone in detail
Pt reports difficulty with swallowing crushed medications earlier in night with subsequent difficulty swallowing secretions.  + Sneezing and coughing.  Symptoms improving, but not back to baseline.  No pain. O2 sat 92-95% on RA.  On exam no resp distress.  Lungs CTA B without rales or rhonchi.   Will check CXR  monitor for any further changes  Hold off on further PO meds for now
pt and family did not want to be discharged on levaquin as recommended by GI.  After discussion with GI changed to augmentin.  family also requesting prescription for diltiazem 120mg tablets for discharge as they are smaller than the 240mg tablets.  script sent to patients pharmacy    addendum to above 7:00pm   received page wife trying to reach provider "wrong heart medication prescribed"  confirmed with out pharmacy, medication is the same different brand names.  attempted to reach wife x 2 both attempts went to voicemail.  no message left
Linear/Normal reasoning

## 2023-01-03 NOTE — H&P ADULT. - PRESSURE ULCER(S)
-- CST Phone (Incoming) ArturHector (Self) [phone] (M)        Alternative phone number: no    Can a detailed message be left? Yes    Message Turnaround:     Is it Working Hours? Yes - Working Hours     [place]:    Please give this turnaround time to the caller:   \"This message will be sent to [state Provider's name]. The clinical team will fulfill your request as soon as they review your message.\"                 no

## 2023-02-20 ENCOUNTER — OFFICE (OUTPATIENT)
Dept: URBAN - METROPOLITAN AREA CLINIC 90 | Facility: CLINIC | Age: 78
Setting detail: OPHTHALMOLOGY
End: 2023-02-20
Payer: MEDICARE

## 2023-02-20 DIAGNOSIS — Z83.511: ICD-10-CM

## 2023-02-20 DIAGNOSIS — H25.13: ICD-10-CM

## 2023-02-20 DIAGNOSIS — H31.002: ICD-10-CM

## 2023-02-20 DIAGNOSIS — H40.033: ICD-10-CM

## 2023-02-20 PROCEDURE — 92014 COMPRE OPH EXAM EST PT 1/>: CPT | Performed by: OPHTHALMOLOGY

## 2023-02-20 ASSESSMENT — REFRACTION_CURRENTRX
OD_VPRISM_DIRECTION: PROGS
OD_SPHERE: +3.00
OD_ADD: +2.50
OS_VPRISM_DIRECTION: PROGS
OS_ADD: +2.50
OS_SPHERE: +3.25
OS_CYLINDER: -0.75
OD_CYLINDER: -1.00
OD_OVR_VA: 20/
OS_AXIS: 096
OS_OVR_VA: 20/
OD_AXIS: 079

## 2023-02-20 ASSESSMENT — CONFRONTATIONAL VISUAL FIELD TEST (CVF)
OS_FINDINGS: FULL
OD_FINDINGS: FULL

## 2023-02-20 ASSESSMENT — AXIALLENGTH_DERIVED
OS_AL: 22.3389
OD_AL: 22.0642

## 2023-02-20 ASSESSMENT — VISUAL ACUITY
OS_BCVA: 20/30-1
OD_BCVA: 20/25-2

## 2023-02-20 ASSESSMENT — KERATOMETRY
METHOD_AUTO_MANUAL: AUTO
OD_AXISANGLE_DEGREES: 129
OD_K1POWER_DIOPTERS: 44.00
OS_K2POWER_DIOPTERS: 43.50
OS_K1POWER_DIOPTERS: 42.75
OS_AXISANGLE_DEGREES: 083
OD_K2POWER_DIOPTERS: 45.00

## 2023-02-20 ASSESSMENT — REFRACTION_AUTOREFRACTION
OS_SPHERE: +4.25
OD_SPHERE: +3.75
OD_CYLINDER: -1.00
OD_AXIS: 094
OS_AXIS: 099
OS_CYLINDER: -1.00

## 2023-02-20 ASSESSMENT — SPHEQUIV_DERIVED
OS_SPHEQUIV: 3.75
OD_SPHEQUIV: 3.25

## 2023-02-20 ASSESSMENT — TONOMETRY
OS_IOP_MMHG: 14
OD_IOP_MMHG: 14

## 2023-03-06 ENCOUNTER — OFFICE (OUTPATIENT)
Dept: URBAN - METROPOLITAN AREA CLINIC 90 | Facility: CLINIC | Age: 78
Setting detail: OPHTHALMOLOGY
End: 2023-03-06
Payer: MEDICARE

## 2023-03-06 DIAGNOSIS — H25.13: ICD-10-CM

## 2023-03-06 DIAGNOSIS — H31.002: ICD-10-CM

## 2023-03-06 DIAGNOSIS — H40.033: ICD-10-CM

## 2023-03-06 DIAGNOSIS — H52.4: ICD-10-CM

## 2023-03-06 DIAGNOSIS — H52.7: ICD-10-CM

## 2023-03-06 DIAGNOSIS — Z83.511: ICD-10-CM

## 2023-03-06 PROCEDURE — 92015 DETERMINE REFRACTIVE STATE: CPT | Performed by: OPHTHALMOLOGY

## 2023-03-06 PROCEDURE — 92014 COMPRE OPH EXAM EST PT 1/>: CPT | Performed by: OPHTHALMOLOGY

## 2023-03-06 PROCEDURE — 92020 GONIOSCOPY: CPT | Performed by: OPHTHALMOLOGY

## 2023-03-06 ASSESSMENT — KERATOMETRY
OD_K1POWER_DIOPTERS: 43.75
OD_K2POWER_DIOPTERS: 44.75
OD_AXISANGLE_DEGREES: 128
METHOD_AUTO_MANUAL: AUTO
OS_K1POWER_DIOPTERS: 43.50
OS_AXISANGLE_DEGREES: 057
OS_K2POWER_DIOPTERS: 44.50

## 2023-03-06 ASSESSMENT — REFRACTION_CURRENTRX
OD_SPHERE: +3.00
OD_CYLINDER: -
OD_AXIS: 079
OD_CYLINDER: -1.00
OS_AXIS: 096
OS_ADD: +2.00
OD_ADD: +2.50
OS_OVR_VA: 20/
OS_OVR_VA: 20/
OS_SPHERE: +3.25
OD_VPRISM_DIRECTION: PROGS
OS_ADD: +2.50
OD_ADD: +2.00
OS_CYLINDER: -0.75
OD_OVR_VA: 20/
OS_AXIS: 100
OD_SPHERE: +3.00
OD_CYLINDER: -1.00
OS_CYLINDER: -0.75
OD_AXIS: 080
OD_OVR_VA: 20/
OS_VPRISM_DIRECTION: PROGS
OD_OVR_VA: 20/
OS_VPRISM_DIRECTION: PROGS
OS_OVR_VA: 20/
OD_VPRISM_DIRECTION: PROGS
OS_SPHERE: +3.25

## 2023-03-06 ASSESSMENT — VISUAL ACUITY
OD_BCVA: 20/30
OS_BCVA: 20/40

## 2023-03-06 ASSESSMENT — REFRACTION_MANIFEST
OD_VA2: 20/30(J2)+
OS_VA2: 20/30(J2)+
OS_ADD: +2.50
OS_VA1: 20/25-1
OD_ADD: +2.50
OS_CYLINDER: -1.00
OD_AXIS: 095
OD_VA1: 20/40+-
OD_CYLINDER: -1.00
OS_SPHERE: +3.75
OS_AXIS: 090
OD_SPHERE: +3.50

## 2023-03-06 ASSESSMENT — REFRACTION_AUTOREFRACTION
OS_SPHERE: +4.25
OS_CYLINDER: -1.25
OD_CYLINDER: -1.25
OD_SPHERE: +4.00
OD_AXIS: 092
OS_AXIS: 096

## 2023-03-06 ASSESSMENT — TONOMETRY
OS_IOP_MMHG: 10
OD_IOP_MMHG: 10

## 2023-03-06 ASSESSMENT — CONFRONTATIONAL VISUAL FIELD TEST (CVF)
OS_FINDINGS: FULL
OD_FINDINGS: FULL

## 2023-03-06 ASSESSMENT — SPHEQUIV_DERIVED
OS_SPHEQUIV: 3.625
OS_SPHEQUIV: 3.25
OD_SPHEQUIV: 3
OD_SPHEQUIV: 3.375

## 2023-03-06 ASSESSMENT — AXIALLENGTH_DERIVED
OD_AL: 22.2309
OS_AL: 22.0971
OD_AL: 22.1019
OS_AL: 22.226

## 2023-03-07 NOTE — PROVIDER CONTACT NOTE (OTHER) - SITUATION
Addended by: KEVIN KAUR on: 3/7/2023 03:38 PM     Modules accepted: Orders    
Addended by: KO GRAY on: 3/7/2023 02:51 PM     Modules accepted: Orders    
Patient is having a wet cough/voice, and is complaining of his throat feeling sore and as if he cant swallow, vitals are stable and O2 sats are in the low 90s with no c/o SOB or chest pain

## 2023-04-13 NOTE — ED PROVIDER NOTE - RECENT EXPOSURE TO
none known VTAMA Counseling: I discussed with the patient that VTAMA is not for use in the eyes, mouth or mouth. They should call the office if they develop any signs of allergic reactions to VTAMA. The patient verbalized understanding of the proper use and possible adverse effects of VTAMA.  All of the patient's questions and concerns were addressed.

## 2023-09-08 NOTE — H&P PST ADULT - NSSUBSTANCEUSE_GEN_ALL_CORE_SD
denies illicit drug use/caffeine Imiquimod Pregnancy And Lactation Text: This medication is Pregnancy Category C. It is unknown if this medication is excreted in breast milk.

## 2024-03-20 ENCOUNTER — OFFICE (OUTPATIENT)
Dept: URBAN - METROPOLITAN AREA CLINIC 90 | Facility: CLINIC | Age: 79
Setting detail: OPHTHALMOLOGY
End: 2024-03-20
Payer: MEDICARE

## 2024-03-20 DIAGNOSIS — H25.13: ICD-10-CM

## 2024-03-20 DIAGNOSIS — H52.7: ICD-10-CM

## 2024-03-20 DIAGNOSIS — H40.033: ICD-10-CM

## 2024-03-20 DIAGNOSIS — H31.002: ICD-10-CM

## 2024-03-20 DIAGNOSIS — Z83.511: ICD-10-CM

## 2024-03-20 DIAGNOSIS — H52.4: ICD-10-CM

## 2024-03-20 PROCEDURE — 92020 GONIOSCOPY: CPT | Performed by: OPHTHALMOLOGY

## 2024-03-20 PROCEDURE — 92015 DETERMINE REFRACTIVE STATE: CPT | Performed by: OPHTHALMOLOGY

## 2024-03-20 PROCEDURE — 92014 COMPRE OPH EXAM EST PT 1/>: CPT | Performed by: OPHTHALMOLOGY

## 2024-03-20 ASSESSMENT — REFRACTION_CURRENTRX
OD_ADD: +2.50
OS_VPRISM_DIRECTION: PROGS
OS_AXIS: 100
OD_OVR_VA: 20/
OS_SPHERE: +2.50
OD_CYLINDER: -1.00
OS_SPHERE: +3.25
OS_CYLINDER: -0.75
OD_VPRISM_DIRECTION: PROGS
OD_ADD: +2.50
OD_ADD: +2.00
OS_VPRISM_DIRECTION: PROGS
OS_OVR_VA: 20/
OD_AXIS: 080
OS_OVR_VA: 20/
OD_AXIS: 079
OD_SPHERE: +3.00
OD_VPRISM_DIRECTION: PROGS
OS_AXIS: 096
OD_CYLINDER: SPHERE
OD_VPRISM_DIRECTION: PROGS
OS_ADD: +2.50
OS_SPHERE: +3.25
OS_OVR_VA: 20/
OS_VPRISM_DIRECTION: PROGS
OS_ADD: +2.50
OD_SPHERE: +2.50
OS_CYLINDER: SPHERE
OD_OVR_VA: 20/
OD_AXIS: 000
OS_AXIS: 000
OD_CYLINDER: -1.00
OD_OVR_VA: 20/
OD_SPHERE: +3.00
OS_CYLINDER: -0.75
OS_ADD: +2.00

## 2024-03-20 ASSESSMENT — REFRACTION_MANIFEST
OD_AXIS: 090
OD_ADD: +2.50
OD_CYLINDER: -1.25
OD_ADD: +2.75
OS_CYLINDER: -0.75
OD_AXIS: 095
OS_AXIS: 095
OU_VA: 20/20-
OD_SPHERE: +3.75
OD_CYLINDER: -1.00
OS_SPHERE: +3.75
OS_SPHERE: +3.25
OS_VA2: 20/30(J2)+
OS_CYLINDER: -1.00
OS_VA1: 20/20-
OD_VA1: 20/20
OS_VA1: 20/25-1
OS_ADD: +2.75
OS_AXIS: 090
OS_ADD: +2.50
OD_SPHERE: +3.50
OD_VA1: 20/40+-
OD_VA2: 20/30(J2)+

## 2024-03-20 ASSESSMENT — SPHEQUIV_DERIVED
OD_SPHEQUIV: 3
OD_SPHEQUIV: 3.125
OS_SPHEQUIV: 3.25
OS_SPHEQUIV: 2.875

## 2024-04-03 NOTE — H&P ADULT. - EXPECTED LOS
Bed: 05  Expected date:   Expected time:   Means of arrival:   Comments:  Next up  
Called [facility] for transportation, states they will be calling back in 10 minutes   
[facility] and [facility] stated [facility] is on the way  
TRAUMA ALERT    TIME PAGED:2011    LAB RESPONDED: [facility] @ 2014  RADIOLOGY RESPONDED: [facility] @ 2012  CT RESPONDED: [facility] @ 2011  
4 days

## 2024-08-23 ENCOUNTER — APPOINTMENT (OUTPATIENT)
Dept: UROLOGY | Facility: CLINIC | Age: 79
End: 2024-08-23
Payer: MEDICARE

## 2024-08-23 VITALS
DIASTOLIC BLOOD PRESSURE: 76 MMHG | SYSTOLIC BLOOD PRESSURE: 163 MMHG | BODY MASS INDEX: 23.33 KG/M2 | OXYGEN SATURATION: 97 % | HEART RATE: 58 BPM | RESPIRATION RATE: 17 BRPM | WEIGHT: 158 LBS | TEMPERATURE: 98 F

## 2024-08-23 DIAGNOSIS — N52.9 MALE ERECTILE DYSFUNCTION, UNSPECIFIED: ICD-10-CM

## 2024-08-23 DIAGNOSIS — N39.3 STRESS INCONTINENCE (FEMALE) (MALE): ICD-10-CM

## 2024-08-23 DIAGNOSIS — K20.90 ESOPHAGITIS, UNSPECIFIED WITHOUT BLEEDING: ICD-10-CM

## 2024-08-23 DIAGNOSIS — K59.00 CONSTIPATION, UNSPECIFIED: ICD-10-CM

## 2024-08-23 PROCEDURE — 99204 OFFICE O/P NEW MOD 45 MIN: CPT

## 2024-08-23 PROCEDURE — G2211 COMPLEX E/M VISIT ADD ON: CPT

## 2024-08-23 PROCEDURE — 99214 OFFICE O/P EST MOD 30 MIN: CPT

## 2024-08-23 NOTE — LETTER BODY
[FreeTextEntry1] : Kaushik Raymundo MD  [address],  [facility] [address]  [phone]    Dear Dr. Raymundo,    Reason for Visit: BPH. Stress incontinence.    This is a 79 year-old gentleman with a history of BPH status post previous TURP/ GreenLight laser vaporization of prostate and stress incontinence. He previously underwent TURP with Dr. Carlos Prado in 2007. The patient developed urinary incontinence after surgery. He has had incontinence for 17 years now. He previously developed double pneumonia with gram negative sepsis. He has a history of scar tissue. The patient returns today for follow-up. Since he was last seen, the patient reports that he saw Dr. Jim Maya but did not end up following up with him. The patient denies any gross hematuria or dysuria or urinary incontinence. The patient denies any interference of function. All other review of systems are negative. Past medical history, family history and social history were inquired and were noncontributory to current condition. The patient drinks socially. Medications and allergies were reviewed. He has no known allergies to medication.    On examination, the patient is a older appearing gentleman in no acute distress. He is alert and oriented follows commands. He has normal mood and affect. He is normocephalic. Oral no thrush. Neck is supple. Respirations are unlabored. His abdomen is soft and nontender. Liver is nonpalpable. Bladder is nonpalpable. No CVA tenderness. Neurologically he is grossly intact. No peripheral edema. Skin without gross abnormality. He has normal male external genitalia. Normal meatus. Bilateral testes are descended intrascrotally and normal to palpation.  There is evidence of a small 5 mm dermoid cyst at the base of the right phallus.  This is likely secondary to chronic irritation or ingrown hair from his penile Nagel clamp.  There is also an abrasion on the phallic skin on the right side as well.    Assessment: BPH. Stress incontinence.  Dermoid cyst.    I counseled the patient. In terms of his urinary incontinence, the patient reports he saw Dr. Jim Maya but did not follow up afterwards. I recommended he consider following up with Dr. Jim Maya.  His chronic use of Cunningham clamp is causing skin irritation.  The risks and benefits were discussed. Alternatives were given. I answered the patient questions. I recommended the patient obtain PSA, and BMP today to establish baseline.  I recommended the patient obtain urinalysis and urine culture to look for infections. The patient will follow-up as directed and will contact me with any questions or concerns. Thank you for the opportunity to participate in the care of this patient. I'll keep you updated on his progress.  Patient will continue longitudinal care for their complex and chronic condition.  Plan:  BMP. PSA. Urinalysis. Urine culture. Consider follow up with Dr. Jim Maya. Follow up in 1 month.  I spent 30-minutes time today on all issues related to this patient on today date of service including non face to face time.

## 2024-08-23 NOTE — LETTER BODY
[FreeTextEntry1] : Kaushik Raymundo MD  [address],  [facility] [address]  [phone]    Dear Dr. Raymundo,    Reason for Visit: BPH. Stress incontinence.    This is a 79 year-old gentleman with a history of BPH status post previous TURP/ GreenLight laser vaporization of prostate and stress incontinence. He previously underwent TURP with Dr. Carlos Prado in 2007. The patient developed urinary incontinence after surgery. He has had incontinence for 17 years now. He previously developed double pneumonia with gram negative sepsis. He has a history of scar tissue. The patient returns today for follow-up. Since he was last seen, the patient reports that he saw Dr. Jim Maya but did not end up following up with him. The patient denies any gross hematuria or dysuria or urinary incontinence. The patient denies any interference of function. All other review of systems are negative. Past medical history, family history and social history were inquired and were noncontributory to current condition. The patient drinks socially. Medications and allergies were reviewed. He has no known allergies to medication.    On examination, the patient is a older appearing gentleman in no acute distress. He is alert and oriented follows commands. He has normal mood and affect. He is normocephalic. Oral no thrush. Neck is supple. Respirations are unlabored. His abdomen is soft and nontender. Liver is nonpalpable. Bladder is nonpalpable. No CVA tenderness. Neurologically he is grossly intact. No peripheral edema. Skin without gross abnormality. He has normal male external genitalia. Normal meatus. Bilateral testes are descended intrascrotally and normal to palpation.  There is evidence of a small 5 mm dermoid cyst at the base of the right phallus.  This is likely secondary to chronic irritation or ingrown hair from his penile Nagel clamp.  There is also an abrasion on the phallic skin on the right side as well.    Assessment: BPH. Stress incontinence.  Dermoid cyst.    I counseled the patient. In terms of his urinary incontinence, the patient reports he saw Dr. Jim Maya but did not follow up afterwards. I recommended he consider following up with Dr. Jim Maya.  His chronic use of Cunningham clamp is causing skin irritation.  The risks and benefits were discussed. Alternatives were given. I answered the patient questions. I recommended the patient obtain PSA, and BMP today to establish baseline.  I recommended the patient obtain urinalysis and urine culture to look for infections. The patient will follow-up as directed and will contact me with any questions or concerns. Thank you for the opportunity to participate in the care of this patient. I'll keep you updated on his progress.  Patient will continue longitudinal care for their complex and chronic condition.  Plan:  BMP. PSA. Urinalysis. Urine culture. Consider follow up with Dr. Jim Maya. Follow up in 1 month.  I spent 30-minutes time today on all issues related to this patient on today date of service including non face to face time.

## 2024-08-23 NOTE — ADDENDUM
[FreeTextEntry1] : Entered by Norberto Unger, acting as scribe for Dr. Shaun Velasco. The documentation recorded by the scribe accurately reflects the service I personally performed and the decisions made by me.

## 2024-08-24 LAB
ANION GAP SERPL CALC-SCNC: 14 MMOL/L
APPEARANCE: CLEAR
BACTERIA: ABNORMAL /HPF
BILIRUBIN URINE: NEGATIVE
BLOOD URINE: NEGATIVE
BUN SERPL-MCNC: 16 MG/DL
CALCIUM SERPL-MCNC: 9.9 MG/DL
CAST: 1 /LPF
CHLORIDE SERPL-SCNC: 102 MMOL/L
CO2 SERPL-SCNC: 23 MMOL/L
COLOR: YELLOW
CREAT SERPL-MCNC: 0.92 MG/DL
EGFR: 85 ML/MIN/1.73M2
EPITHELIAL CELLS: 2 /HPF
GLUCOSE QUALITATIVE U: NEGATIVE MG/DL
GLUCOSE SERPL-MCNC: 90 MG/DL
KETONES URINE: NEGATIVE MG/DL
LEUKOCYTE ESTERASE URINE: ABNORMAL
MICROSCOPIC-UA: NORMAL
NITRITE URINE: POSITIVE
PH URINE: 5.5
POTASSIUM SERPL-SCNC: 4.4 MMOL/L
PROTEIN URINE: NEGATIVE MG/DL
PSA FREE FLD-MCNC: NORMAL %
PSA FREE SERPL-MCNC: <0.01 NG/ML
PSA SERPL-MCNC: 0.08 NG/ML
RED BLOOD CELLS URINE: 0 /HPF
SODIUM SERPL-SCNC: 138 MMOL/L
SPECIFIC GRAVITY URINE: 1.01
UROBILINOGEN URINE: 1 MG/DL
WHITE BLOOD CELLS URINE: 69 /HPF

## 2024-08-26 ENCOUNTER — NON-APPOINTMENT (OUTPATIENT)
Age: 79
End: 2024-08-26

## 2024-08-29 LAB
APPEARANCE: CLEAR
BACTERIA: ABNORMAL /HPF
BILIRUBIN URINE: NEGATIVE
BLOOD URINE: NEGATIVE
CAST: 0 /LPF
COLOR: YELLOW
EPITHELIAL CELLS: 2 /HPF
GLUCOSE QUALITATIVE U: NEGATIVE MG/DL
KETONES URINE: NEGATIVE MG/DL
LEUKOCYTE ESTERASE URINE: ABNORMAL
MICROSCOPIC-UA: NORMAL
NITRITE URINE: NEGATIVE
PH URINE: 6.5
PROTEIN URINE: NEGATIVE MG/DL
RED BLOOD CELLS URINE: 0 /HPF
SPECIFIC GRAVITY URINE: 1.01
UROBILINOGEN URINE: 1 MG/DL
WHITE BLOOD CELLS URINE: 65 /HPF

## 2024-09-01 LAB — BACTERIA UR CULT: ABNORMAL

## 2025-04-25 ENCOUNTER — APPOINTMENT (OUTPATIENT)
Dept: UROLOGY | Facility: CLINIC | Age: 80
End: 2025-04-25

## (undated) DEVICE — LINE BREATHE SAMPLNG

## (undated) DEVICE — CATH IV SAFE BC 22G X 1" (BLUE)

## (undated) DEVICE — ELCTR GROUNDING PAD ADULT COVIDIEN

## (undated) DEVICE — BIOPSY FORCEP RADIAL JAW 4 STANDARD WITH NEEDLE

## (undated) DEVICE — BASIN EMESIS 10IN GRADUATED MAUVE

## (undated) DEVICE — TUBING MEDI-VAC W MAXIGRIP CONNECTORS 1/4"X6'

## (undated) DEVICE — ATTACHMENT DISTAL 4X11.35MM

## (undated) DEVICE — GOWN LG

## (undated) DEVICE — FACESHIELD FULL VISOR

## (undated) DEVICE — CONTAINER FORMALIN 80ML YELLOW

## (undated) DEVICE — DRSG BANDAID 0.75X3"

## (undated) DEVICE — DEVICE GRASPING RAPTOR

## (undated) DEVICE — TUBING SUCTION NONCONDUCTIVE 6MM X 12FT

## (undated) DEVICE — DRSG CURITY GAUZE SPONGE 4 X 4" 12-PLY NON-STERILE

## (undated) DEVICE — LUBRICATING JELLY HR ONE SHOT 3G

## (undated) DEVICE — TUBING IV SET GRAVITY 3Y 100" MACRO

## (undated) DEVICE — DRSG 2X2

## (undated) DEVICE — ELCTR ECG CONDUCTIVE ADHESIVE

## (undated) DEVICE — SYR ALLIANCE II INFLATION 60ML

## (undated) DEVICE — PACK IV START WITH CHG

## (undated) DEVICE — BIOPSY FORCEP COLD DISP

## (undated) DEVICE — SALIVA EJECTOR (BLUE)